# Patient Record
Sex: FEMALE | Race: WHITE | Employment: OTHER | URBAN - METROPOLITAN AREA
[De-identification: names, ages, dates, MRNs, and addresses within clinical notes are randomized per-mention and may not be internally consistent; named-entity substitution may affect disease eponyms.]

---

## 2020-11-01 ENCOUNTER — APPOINTMENT (OUTPATIENT)
Dept: GENERAL RADIOLOGY | Age: 85
DRG: 177 | End: 2020-11-01
Payer: COMMERCIAL

## 2020-11-01 ENCOUNTER — HOSPITAL ENCOUNTER (INPATIENT)
Age: 85
LOS: 12 days | Discharge: NURSING, REHAB OR LONG-TERM CARE | DRG: 177 | End: 2020-11-13
Attending: EMERGENCY MEDICINE | Admitting: INTERNAL MEDICINE
Payer: COMMERCIAL

## 2020-11-01 PROBLEM — U07.1 COVID-19: Status: ACTIVE | Noted: 2020-11-01

## 2020-11-01 LAB
ALBUMIN SERPL-MCNC: 3.1 G/DL (ref 3.5–5.2)
ALP BLD-CCNC: 63 U/L (ref 35–104)
ALT SERPL-CCNC: 8 U/L (ref 0–32)
ANION GAP SERPL CALCULATED.3IONS-SCNC: 14 MMOL/L (ref 7–16)
ANION GAP SERPL CALCULATED.3IONS-SCNC: 18 MMOL/L (ref 7–16)
AST SERPL-CCNC: 30 U/L (ref 0–31)
BASOPHILS ABSOLUTE: 0.05 E9/L (ref 0–0.2)
BASOPHILS RELATIVE PERCENT: 0.4 % (ref 0–2)
BILIRUB SERPL-MCNC: 0.4 MG/DL (ref 0–1.2)
BUN BLDV-MCNC: 109 MG/DL (ref 8–23)
BUN BLDV-MCNC: 89 MG/DL (ref 8–23)
C-REACTIVE PROTEIN: 29.2 MG/DL (ref 0–0.4)
CALCIUM SERPL-MCNC: 8.4 MG/DL (ref 8.6–10.2)
CALCIUM SERPL-MCNC: 9.2 MG/DL (ref 8.6–10.2)
CHLORIDE BLD-SCNC: 111 MMOL/L (ref 98–107)
CHLORIDE BLD-SCNC: 115 MMOL/L (ref 98–107)
CO2: 22 MMOL/L (ref 22–29)
CO2: 23 MMOL/L (ref 22–29)
CREAT SERPL-MCNC: 1.6 MG/DL (ref 0.5–1)
CREAT SERPL-MCNC: 3 MG/DL (ref 0.5–1)
CREATININE URINE: 88 MG/DL (ref 29–226)
D DIMER: 907 NG/ML DDU
EKG ATRIAL RATE: 96 BPM
EKG P AXIS: 48 DEGREES
EKG P-R INTERVAL: 132 MS
EKG Q-T INTERVAL: 356 MS
EKG QRS DURATION: 72 MS
EKG QTC CALCULATION (BAZETT): 449 MS
EKG R AXIS: 9 DEGREES
EKG T AXIS: 71 DEGREES
EKG VENTRICULAR RATE: 96 BPM
EOSINOPHILS ABSOLUTE: 0.02 E9/L (ref 0.05–0.5)
EOSINOPHILS RELATIVE PERCENT: 0.2 % (ref 0–6)
FERRITIN: 999 NG/ML
GFR AFRICAN AMERICAN: 18
GFR AFRICAN AMERICAN: 36
GFR NON-AFRICAN AMERICAN: 15 ML/MIN/1.73
GFR NON-AFRICAN AMERICAN: 30 ML/MIN/1.73
GLUCOSE BLD-MCNC: 106 MG/DL (ref 74–99)
GLUCOSE BLD-MCNC: 196 MG/DL (ref 74–99)
HCT VFR BLD CALC: 46.8 % (ref 34–48)
HEMOGLOBIN: 14.7 G/DL (ref 11.5–15.5)
IMMATURE GRANULOCYTES #: 0.22 E9/L
IMMATURE GRANULOCYTES %: 1.7 % (ref 0–5)
LACTIC ACID: 2.1 MMOL/L (ref 0.5–2.2)
LYMPHOCYTES ABSOLUTE: 2.58 E9/L (ref 1.5–4)
LYMPHOCYTES RELATIVE PERCENT: 20 % (ref 20–42)
MCH RBC QN AUTO: 26.7 PG (ref 26–35)
MCHC RBC AUTO-ENTMCNC: 31.4 % (ref 32–34.5)
MCV RBC AUTO: 84.9 FL (ref 80–99.9)
MONOCYTES ABSOLUTE: 0.68 E9/L (ref 0.1–0.95)
MONOCYTES RELATIVE PERCENT: 5.3 % (ref 2–12)
NEUTROPHILS ABSOLUTE: 9.36 E9/L (ref 1.8–7.3)
NEUTROPHILS RELATIVE PERCENT: 72.4 % (ref 43–80)
OSMOLALITY URINE: 594 MOSM/KG (ref 300–900)
OSMOLALITY: 349 MOSM/KG (ref 285–310)
PDW BLD-RTO: 15.4 FL (ref 11.5–15)
PLATELET # BLD: 299 E9/L (ref 130–450)
PMV BLD AUTO: 10.9 FL (ref 7–12)
POTASSIUM SERPL-SCNC: 3.9 MMOL/L (ref 3.5–5)
POTASSIUM SERPL-SCNC: 4.4 MMOL/L (ref 3.5–5)
PROCALCITONIN: 1.11 NG/ML (ref 0–0.08)
RBC # BLD: 5.51 E12/L (ref 3.5–5.5)
REASON FOR REJECTION: NORMAL
REASON FOR REJECTION: NORMAL
REJECTED TEST: NORMAL
REJECTED TEST: NORMAL
SEDIMENTATION RATE, ERYTHROCYTE: 101 MM/HR (ref 0–20)
SODIUM BLD-SCNC: 151 MMOL/L (ref 132–146)
SODIUM BLD-SCNC: 152 MMOL/L (ref 132–146)
SODIUM URINE: <20 MMOL/L
TOTAL PROTEIN: 7.8 G/DL (ref 6.4–8.3)
TROPONIN: 0.09 NG/ML (ref 0–0.03)
TROPONIN: <0.01 NG/ML (ref 0–0.03)
WBC # BLD: 12.9 E9/L (ref 4.5–11.5)

## 2020-11-01 PROCEDURE — 84300 ASSAY OF URINE SODIUM: CPT

## 2020-11-01 PROCEDURE — 93005 ELECTROCARDIOGRAM TRACING: CPT | Performed by: EMERGENCY MEDICINE

## 2020-11-01 PROCEDURE — 2580000003 HC RX 258: Performed by: INTERNAL MEDICINE

## 2020-11-01 PROCEDURE — 2580000003 HC RX 258: Performed by: EMERGENCY MEDICINE

## 2020-11-01 PROCEDURE — 96365 THER/PROPH/DIAG IV INF INIT: CPT

## 2020-11-01 PROCEDURE — 82728 ASSAY OF FERRITIN: CPT

## 2020-11-01 PROCEDURE — 85651 RBC SED RATE NONAUTOMATED: CPT

## 2020-11-01 PROCEDURE — 96367 TX/PROPH/DG ADDL SEQ IV INF: CPT

## 2020-11-01 PROCEDURE — 2700000000 HC OXYGEN THERAPY PER DAY

## 2020-11-01 PROCEDURE — 2500000003 HC RX 250 WO HCPCS: Performed by: INTERNAL MEDICINE

## 2020-11-01 PROCEDURE — 86140 C-REACTIVE PROTEIN: CPT

## 2020-11-01 PROCEDURE — 84145 PROCALCITONIN (PCT): CPT

## 2020-11-01 PROCEDURE — 80053 COMPREHEN METABOLIC PANEL: CPT

## 2020-11-01 PROCEDURE — 85025 COMPLETE CBC W/AUTO DIFF WBC: CPT

## 2020-11-01 PROCEDURE — 6360000002 HC RX W HCPCS: Performed by: INTERNAL MEDICINE

## 2020-11-01 PROCEDURE — 71045 X-RAY EXAM CHEST 1 VIEW: CPT

## 2020-11-01 PROCEDURE — 85378 FIBRIN DEGRADE SEMIQUANT: CPT

## 2020-11-01 PROCEDURE — 84484 ASSAY OF TROPONIN QUANT: CPT

## 2020-11-01 PROCEDURE — XW033E5 INTRODUCTION OF REMDESIVIR ANTI-INFECTIVE INTO PERIPHERAL VEIN, PERCUTANEOUS APPROACH, NEW TECHNOLOGY GROUP 5: ICD-10-PCS | Performed by: INTERNAL MEDICINE

## 2020-11-01 PROCEDURE — 2500000003 HC RX 250 WO HCPCS: Performed by: EMERGENCY MEDICINE

## 2020-11-01 PROCEDURE — 36415 COLL VENOUS BLD VENIPUNCTURE: CPT

## 2020-11-01 PROCEDURE — 82570 ASSAY OF URINE CREATININE: CPT

## 2020-11-01 PROCEDURE — 87040 BLOOD CULTURE FOR BACTERIA: CPT

## 2020-11-01 PROCEDURE — 83930 ASSAY OF BLOOD OSMOLALITY: CPT

## 2020-11-01 PROCEDURE — 83605 ASSAY OF LACTIC ACID: CPT

## 2020-11-01 PROCEDURE — 83615 LACTATE (LD) (LDH) ENZYME: CPT

## 2020-11-01 PROCEDURE — 6370000000 HC RX 637 (ALT 250 FOR IP): Performed by: INTERNAL MEDICINE

## 2020-11-01 PROCEDURE — 6360000002 HC RX W HCPCS: Performed by: EMERGENCY MEDICINE

## 2020-11-01 PROCEDURE — 99283 EMERGENCY DEPT VISIT LOW MDM: CPT

## 2020-11-01 PROCEDURE — 80048 BASIC METABOLIC PNL TOTAL CA: CPT

## 2020-11-01 PROCEDURE — 83935 ASSAY OF URINE OSMOLALITY: CPT

## 2020-11-01 PROCEDURE — 96361 HYDRATE IV INFUSION ADD-ON: CPT

## 2020-11-01 PROCEDURE — 93010 ELECTROCARDIOGRAM REPORT: CPT | Performed by: INTERNAL MEDICINE

## 2020-11-01 PROCEDURE — 2140000000 HC CCU INTERMEDIATE R&B

## 2020-11-01 RX ORDER — HEPARIN SODIUM 10000 [USP'U]/ML
5000 INJECTION, SOLUTION INTRAVENOUS; SUBCUTANEOUS EVERY 8 HOURS SCHEDULED
Status: DISCONTINUED | OUTPATIENT
Start: 2020-11-01 | End: 2020-11-02

## 2020-11-01 RX ORDER — 0.9 % SODIUM CHLORIDE 0.9 %
30 INTRAVENOUS SOLUTION INTRAVENOUS PRN
Status: DISCONTINUED | OUTPATIENT
Start: 2020-11-01 | End: 2020-11-14 | Stop reason: HOSPADM

## 2020-11-01 RX ORDER — 0.9 % SODIUM CHLORIDE 0.9 %
1000 INTRAVENOUS SOLUTION INTRAVENOUS ONCE
Status: COMPLETED | OUTPATIENT
Start: 2020-11-01 | End: 2020-11-01

## 2020-11-01 RX ORDER — DEXAMETHASONE SODIUM PHOSPHATE 10 MG/ML
8 INJECTION, SOLUTION INTRAMUSCULAR; INTRAVENOUS ONCE
Status: COMPLETED | OUTPATIENT
Start: 2020-11-01 | End: 2020-11-01

## 2020-11-01 RX ORDER — ACETAMINOPHEN 650 MG/1
650 SUPPOSITORY RECTAL EVERY 6 HOURS PRN
Status: DISCONTINUED | OUTPATIENT
Start: 2020-11-01 | End: 2020-11-14 | Stop reason: HOSPADM

## 2020-11-01 RX ORDER — ACETAMINOPHEN 325 MG/1
650 TABLET ORAL EVERY 6 HOURS PRN
Status: DISCONTINUED | OUTPATIENT
Start: 2020-11-01 | End: 2020-11-14 | Stop reason: HOSPADM

## 2020-11-01 RX ORDER — BISACODYL 10 MG
10 SUPPOSITORY, RECTAL RECTAL PRN
Status: DISCONTINUED | OUTPATIENT
Start: 2020-11-01 | End: 2020-11-14 | Stop reason: HOSPADM

## 2020-11-01 RX ORDER — LATANOPROST 50 UG/ML
1 SOLUTION/ DROPS OPHTHALMIC NIGHTLY
Status: DISCONTINUED | OUTPATIENT
Start: 2020-11-01 | End: 2020-11-14 | Stop reason: HOSPADM

## 2020-11-01 RX ORDER — GUAIFENESIN/DEXTROMETHORPHAN 100-10MG/5
5 SYRUP ORAL EVERY 4 HOURS PRN
Status: DISCONTINUED | OUTPATIENT
Start: 2020-11-01 | End: 2020-11-14 | Stop reason: HOSPADM

## 2020-11-01 RX ORDER — 0.9 % SODIUM CHLORIDE 0.9 %
500 INTRAVENOUS SOLUTION INTRAVENOUS ONCE
Status: COMPLETED | OUTPATIENT
Start: 2020-11-01 | End: 2020-11-01

## 2020-11-01 RX ORDER — DEXAMETHASONE SODIUM PHOSPHATE 4 MG/ML
6 INJECTION, SOLUTION INTRA-ARTICULAR; INTRALESIONAL; INTRAMUSCULAR; INTRAVENOUS; SOFT TISSUE DAILY
Status: DISCONTINUED | OUTPATIENT
Start: 2020-11-02 | End: 2020-11-05

## 2020-11-01 RX ORDER — DEXTROSE MONOHYDRATE 50 MG/ML
INJECTION, SOLUTION INTRAVENOUS CONTINUOUS
Status: DISCONTINUED | OUTPATIENT
Start: 2020-11-01 | End: 2020-11-02

## 2020-11-01 RX ADMIN — DEXAMETHASONE SODIUM PHOSPHATE 8 MG: 10 INJECTION, SOLUTION INTRAMUSCULAR; INTRAVENOUS at 07:31

## 2020-11-01 RX ADMIN — IPRATROPIUM BROMIDE AND ALBUTEROL 1 PUFF: 20; 100 SPRAY, METERED RESPIRATORY (INHALATION) at 20:22

## 2020-11-01 RX ADMIN — IPRATROPIUM BROMIDE AND ALBUTEROL 1 PUFF: 20; 100 SPRAY, METERED RESPIRATORY (INHALATION) at 17:19

## 2020-11-01 RX ADMIN — HEPARIN SODIUM 5000 UNITS: 10000 INJECTION INTRAVENOUS; SUBCUTANEOUS at 21:33

## 2020-11-01 RX ADMIN — REMDESIVIR 200 MG: 100 INJECTION, POWDER, LYOPHILIZED, FOR SOLUTION INTRAVENOUS at 18:27

## 2020-11-01 RX ADMIN — HEPARIN SODIUM 5000 UNITS: 10000 INJECTION INTRAVENOUS; SUBCUTANEOUS at 18:27

## 2020-11-01 RX ADMIN — LATANOPROST 1 DROP: 50 SOLUTION OPHTHALMIC at 21:33

## 2020-11-01 RX ADMIN — DEXTROSE MONOHYDRATE: 50 INJECTION, SOLUTION INTRAVENOUS at 23:55

## 2020-11-01 RX ADMIN — DOXYCYCLINE 100 MG: 100 INJECTION, POWDER, LYOPHILIZED, FOR SOLUTION INTRAVENOUS at 07:08

## 2020-11-01 RX ADMIN — CEFEPIME 2 G: 2 INJECTION, POWDER, FOR SOLUTION INTRAVENOUS at 06:44

## 2020-11-01 RX ADMIN — SODIUM CHLORIDE 500 ML: 9 INJECTION, SOLUTION INTRAVENOUS at 06:09

## 2020-11-01 RX ADMIN — SODIUM CHLORIDE 1000 ML: 9 INJECTION, SOLUTION INTRAVENOUS at 07:31

## 2020-11-01 RX ADMIN — DEXTROSE MONOHYDRATE: 50 INJECTION, SOLUTION INTRAVENOUS at 13:00

## 2020-11-01 ASSESSMENT — PAIN SCALES - PAIN ASSESSMENT IN ADVANCED DEMENTIA (PAINAD)
FACIALEXPRESSION: 0
BODYLANGUAGE: 0
FACIALEXPRESSION: 0
BREATHING: 0
BODYLANGUAGE: 1
NEGVOCALIZATION: 1
BREATHING: 0
BODYLANGUAGE: 1
TOTALSCORE: 1
BREATHING: 0
CONSOLABILITY: 0
FACIALEXPRESSION: 0
CONSOLABILITY: 1
TOTALSCORE: 3
TOTALSCORE: 2
CONSOLABILITY: 0

## 2020-11-01 NOTE — H&P
Inpatient H&P      PCP:  Luan Ham MD  Admitting Physician:  Jony Michael DO  Consultants: Infectious disease  Chief Complaint:    Chief Complaint   Patient presents with    Shortness of Breath     low pulse ox       History of Present Illness  Teresa Parker is a 80 y.o. female who presents to [address] [place] [address] ER with shortness of breath. Adan Dinh has a past medical history that includes dementia, rheumatoid arthritis, spinal stenosis. History is obtained from EMR R and ER physician due to patient currently nonverbal.  Kurt Baumgarten presented to the ER from [facility] for evaluation of hypoxia. She was found to be positive for coronavirus at their facility. Upon ER arrival she was found to be 86%. In the ER, she was found to be in renal failure and hyponatremic. Chest x-ray showing bilateral infiltrates. I spoke with her daughter, Bertram Razo, who is the POA and discussed CODE STATUS. Bertram Razo wants her to be a limited code with no intubation, no CPR, no emergency resuscitative meds, no defibrillation. She wishes for her to still get fluids for her renal failure and treatment for coronavirus. ER Course  Upon presentation to the ER, routine labwork was performed which revealed sodium of 152, BUN of 109, creatinine 3.0, troponin 0 0.09, WBC 12.9.. Imaging results are as outlined below in the Imaging section of this note. EKG revealed NSR with possible left atrial enlargement and nonspecific ST abnormality. Upon arrival to the ER, patient was 99/61.   The patient received Decadron in the emergency room and was admitted under the care of [address] Admission - 10/25/15     DEMENTIA     PSYCHOSIS     Normocytic anemia     Rheumatoid arthritis ([facility] [address]     Cervical spondylosis     Lumbar spondylosis     Shoulder joint dysfunction     DDD (degenerative disc disease)     Spinal stenosis     Vertebral compression fracture      Bulging lumbar disc     Cervical spinal canal tumor Dementia associated with other underlying disease with behavioral disturbance    Last Echocardiogram - 5/2/13    Mild mitral regurgitation is present. No mitral valve stenosis present. 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 The aortic valve area is 1.2 cm2 with a maximum gradient of 22.60 mmHg and    a mean gradient of 13.70 mmHg. Mild aortic stenosis is present. The aortic valve is trileaflet. The aortic valve appears moderately sclerotic. No evidence of aortic valve regurgitation. Regular rhythm. ED TRIAGE VITALS  BP: (!) 107/56, Temp: 97.4 °F (36.3 °C), Pulse: 88, Resp: 24, SpO2: 96 %    Vitals:    11/01/20 0421 11/01/20 0635   BP: 99/61 (!) 107/56   Pulse: 96 88   Resp: 28 24   Temp:  97.4 °F (36.3 °C)   TempSrc:  Axillary   SpO2: 99% 96%   Weight: 115 lb (52.2 kg)    Height: 5' 4\" (1.626 m)          Histories  Past Medical History:   Diagnosis Date    Anemia     Cellulitis     GERD (gastroesophageal reflux disease)     Glaucoma     Hypertension     Hypokalemia     Insomnia     Osteoarthritis     Rheumatoid arthritis(714.0)      Past Surgical History:   Procedure Laterality Date    EYE SURGERY      cataracts    JOINT REPLACEMENT Left     OTHER SURGICAL HISTORY Right 8/7/2015    IM nailing right femor     Family History   Problem Relation Age of Onset    Heart Failure Mother        Home Medications  Prior to Admission medications    Medication Sig Start Date End Date Taking?  Authorizing Provider   haloperidol lactate (HALDOL) 5 MG/ML injection Inject 0.1 mLs into the muscle every 6 hours as needed for Agitation 10/26/15   Tasneem Zavala MD   haloperidol lactate (HALDOL) 5 MG/ML injection Inject 0.2 mLs into the muscle every 6 hours as needed for Agitation 10/26/15   Tasneem Zavala MD   oxyCODONE HCl ER 10 MG CR tablet Take 10 mg by mouth every 12 hours    Historical Provider, MD   Omega 3 1000 MG CAPS Take 1,000 mg by mouth    Historical Provider, MD   QUEtiapine (SEROQUEL) 25 MG tablet Take 0.5 tablets by mouth nightly 10/6/15   Issac Abdullahi MD   QUEtiapine (SEROQUEL) 25 MG tablet Take 0.5 tablets by mouth 2 times daily as needed (for Anxiety/Agitation) 10/6/15   Issac Abdullahi MD   rivaroxaban (XARELTO) 15 MG TABS tablet Take 1 tablet by mouth daily (with breakfast) 10/6/15   Issac Abdullahi MD   Ascorbic Acid (VITAMIN C) 250 MG tablet Take 500 mg by mouth 4 times daily     Historical Provider, MD   calcium-vitamin D (OSCAL-500) 500-200 MG-UNIT per tablet Take 1 tablet by mouth 2 times daily    Historical Provider, MD   metoprolol (TOPROL-XL) 50 MG XL tablet Take 50 mg by mouth 2 times daily    Historical Provider, MD   amLODIPine (NORVASC) 2.5 MG tablet Take 2.5 mg by mouth daily    Historical Provider, MD   pantoprazole sodium (PROTONIX) 40 MG PACK packet Take 40 mg by mouth every morning (before breakfast)    Historical Provider, MD   ferrous sulfate 325 (65 FE) MG tablet Take 325 mg by mouth daily (with breakfast)    Historical Provider, MD   sucralfate (CARAFATE) 1 GM tablet Take 1 g by mouth 2 times daily    Historical Provider, MD   aspirin 81 MG tablet Take 81 mg by mouth daily    Historical Provider, MD   Potassium Chloride (KLOR-CON PO) Take 10 mEq by mouth daily    Historical Provider, MD   bisacodyl (DULCOLAX) 10 MG suppository Place 10 mg rectally as needed for Constipation    Historical Provider, MD   traMADol (ULTRAM) 50 MG tablet Take 50 mg by mouth every 6 hours as needed for Pain    Historical Provider, MD   zolpidem (AMBIEN) 5 MG tablet Take 5 mg by mouth nightly as needed for Sleep    Historical Provider, MD   lactase (LACTAID) 3000 UNITS tablet Take 1 tablet by mouth 3 times daily as needed    Historical Provider, MD   acetaminophen (TYLENOL) 500 MG tablet Take 1 tablet by mouth every 4 hours as needed 8/10/15   Malia Mclean DO   celecoxib (CELEBREX) 200 MG capsule Take 1 capsule by mouth daily 8/10/15   Malia Mclean DO   docusate sodium (COLACE, DULCOLAX) 100 MG CAPS Take 100 mg by mouth 2 times daily 8/10/15   Terell Washington MD   magnesium hydroxide (MILK OF MAGNESIA) 400 MG/5ML suspension Take 30 mLs by mouth daily as needed for Constipation 8/10/15   Terell Washington MD   Multiple Vitamins-Minerals (THERAPEUTIC MULTIVITAMIN-MINERALS) tablet Take 1 tablet by mouth daily 8/10/15   Terell Washington MD   furosemide (LASIX) 20 MG tablet Take 20 mg by mouth daily. Historical Provider, MD   Probiotic Product (PROBIOTIC + OMEGA-3) CAPS Take 3 capsules by mouth daily. Historical Provider, MD   therapeutic multivitamin-minerals (THERAGRAN-M) tablet Take 1 tablet by mouth daily. Historical Provider, MD   Bimatoprost (LUMIGAN OP) Apply 1 drop to eye daily. Each eye    Historical Provider, MD       Allergies  Food    Social Hx  Social History     Socioeconomic History    Marital status:       Spouse name: Not on file    Number of children: Not on file    Years of education: Not on file    Highest education level: Not on file   Occupational History    Not on file   Social Needs    Financial resource strain: Not on file    Food insecurity     Worry: Not on file     Inability: Not on file    Transportation needs     Medical: Not on file     Non-medical: Not on file   Tobacco Use    Smoking status: Former Smoker    Smokeless tobacco: Never Used   Substance and Sexual Activity    Alcohol use: No    Drug use: No    Sexual activity: Not on file   Lifestyle    Physical activity     Days per week: Not on file     Minutes per session: Not on file    Stress: Not on file   Relationships    Social connections     Talks on phone: Not on file     Gets together: Not on file     Attends Gnosticism service: Not on file     Active member of club or organization: Not on file     Attends meetings of clubs or organizations: Not on file     Relationship status: Not on file    Intimate partner violence     Fear of current or ex partner: Not on file     Emotionally abused: Not on file     Physically abused: Not on file     Forced sexual activity: Not on file   Other Topics Concern    Not on file   Social History Narrative    Not on file       Review of Systems  Unable to be obtained, patient is nonverbal    Physical Examination  Vitals:  BP (!) 107/56   Pulse 88   Temp 97.4 °F (36.3 °C) (Axillary)   Resp 24   Ht 5' 4\" (1.626 m)   Wt 115 lb (52.2 kg)   SpO2 96%   BMI 19.74 kg/m²   General Appearance:  awake, nonverbal, not following commands. 6L NC  HEENT:  NCAT; PERRL; conjunctiva pink, sclera clear dry mucous membranes  Neck:  no adenopathy, bruit, JVD, tenderness, masses, or nodules; supple, symmetrical, trachea midline, thyroid not enlarged  Lung:  clear to auscultation bilaterally; no use of accessory muscles; no rhonchi, rales, or crackles  Heart:  regular rate and regular rhythm without murmur, rub, or gallop  Abdomen:  soft, nontender, nondistended; normoactive bowel sounds; no organomegaly  Extremities:  extremities normal, atraumatic, no cyanosis or edema  Musculokeletal:  no joint swelling, no muscle tenderness. ROM normal in all joints of extremities.    Neurologic:  mental status A&Ox3, thought content appropriate; CN II-XII grossly intact; sensation intact, motor strength 5/5 globally; no slurred speech      Laboratory Data  Recent Results (from the past 24 hour(s))   CBC Auto Differential    Collection Time: 11/01/20  4:40 AM   Result Value Ref Range    WBC 12.9 (H) 4.5 - 11.5 E9/L    RBC 5.51 (H) 3.50 - 5.50 E12/L    Hemoglobin 14.7 11.5 - 15.5 g/dL    Hematocrit 46.8 34.0 - 48.0 %    MCV 84.9 80.0 - 99.9 fL    MCH 26.7 26.0 - 35.0 pg    MCHC 31.4 (L) 32.0 - 34.5 %    RDW 15.4 (H) 11.5 - 15.0 fL    Platelets 014 949 - 359 E9/L    MPV 10.9 7.0 - 12.0 fL    Neutrophils % 72.4 43.0 - 80.0 %    Immature Granulocytes % 1.7 0.0 - 5.0 %    Lymphocytes % 20.0 20.0 - 42.0 %    Monocytes % 5.3 2.0 - 12.0 %    Eosinophils % 0.2 0.0 - 6.0 % Basophils % 0.4 0.0 - 2.0 %    Neutrophils Absolute 9.36 (H) 1.80 - 7.30 E9/L    Immature Granulocytes # 0.22 E9/L    Lymphocytes Absolute 2.58 1.50 - 4.00 E9/L    Monocytes Absolute 0.68 0.10 - 0.95 E9/L    Eosinophils Absolute 0.02 (L) 0.05 - 0.50 E9/L    Basophils Absolute 0.05 0.00 - 0.20 E9/L   Lactic Acid, Plasma    Collection Time: 11/01/20  4:40 AM   Result Value Ref Range    Lactic Acid 2.1 0.5 - 2.2 mmol/L   SPECIMEN REJECTION    Collection Time: 11/01/20  5:21 AM   Result Value Ref Range    Rejected Test CMP TROP     Reason for Rejection see below    Comprehensive metabolic panel    Collection Time: 11/01/20  6:01 AM   Result Value Ref Range    Sodium 152 (H) 132 - 146 mmol/L    Potassium 4.4 3.5 - 5.0 mmol/L    Chloride 111 (H) 98 - 107 mmol/L    CO2 23 22 - 29 mmol/L    Anion Gap 18 (H) 7 - 16 mmol/L    Glucose 106 (H) 74 - 99 mg/dL     (H) 8 - 23 mg/dL    CREATININE 3.0 (H) 0.5 - 1.0 mg/dL    GFR Non-African American 15 >=60 mL/min/1.73    GFR African American 18     Calcium 9.2 8.6 - 10.2 mg/dL    Total Protein 7.8 6.4 - 8.3 g/dL    Alb 3.1 (L) 3.5 - 5.2 g/dL    Total Bilirubin 0.4 0.0 - 1.2 mg/dL    Alkaline Phosphatase 63 35 - 104 U/L    ALT 8 0 - 32 U/L    AST 30 0 - 31 U/L   Troponin    Collection Time: 11/01/20  6:01 AM   Result Value Ref Range    Troponin 0.09 (H) 0.00 - 0.03 ng/mL       Imaging  Xr Chest Portable    Result Date: 11/1/2020  EXAMINATION: ONE XRAY VIEW OF THE CHEST 11/1/2020 5:07 am COMPARISON: 10/21/2015 HISTORY: ORDERING SYSTEM PROVIDED HISTORY: chest pain TECHNOLOGIST PROVIDED HISTORY: Reason for exam:->chest pain What reading provider will be dictating this exam?->CRC FINDINGS: There are bilateral infiltrates with predominantly peripheral distribution. There is no pleural effusion or pneumothorax seen. There is no cardiomegaly or pulmonary vascular congestion. There are old right-sided rib fractures.  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 Bilateral infiltrates. Assessment and Plan  Patient is a 80 y.o. female who presented with shortness of breath. The active problem list is as follows:    · Acute hypoxic respiratory failure secondary to COVID-19 viral pneumonia-infectious diseases been consulted. Decadron daily. Covid labs. Remdesivir. · Acute kidney injury- likely secondary to dehydration. Continue IV fluids  · Hypernatremia-hypotonic fluids. Will recheck BMP and adjust fluids based on results. I's and O's. · Elevated troponin in the setting of acute kidney injury    · Routine labs in the morning. · DVT prophylaxis. · Please see orders for further management and care.         Jesse Mar  DO  7:39 AM  11/1/2020

## 2020-11-01 NOTE — ACP (ADVANCE CARE PLANNING)
ADVANCED CARE PLANNING    Patient Name: Kayla Philip       YOB: 1925              MRN:    [id number]  Admission Date:  11/1/2020  4:19 AM      Active Diagnoses: Active Problems:    COVID-19  Resolved Problems:    * No resolved hospital problems. *      These active diagnoses are of sufficient risk that focused discussion on advanced care planning is indicated in order to allow the patient to thoughtfully consider personal goals of care; and, if situations arise that prevent the patient to personally give input, to ensure appropriate representation of their personal desire for different levels and levels of care. Person(s) present in discussion:  Jackson Valdes DO, Family members:Daughter Alexia Bridges, [place]. Discussion:  Patient is nonverbal currently and not able to participate in care plan. I called daughter Alexia Bridges who is POA ([phone]) and spoke with her about code status and care plan. She states that Ms. Antonio Dailey would not want to be intubated, have CPR, emergency resuscitative medications, or defibrillation. Alexia Bridges still wants Ms. Diaz to receive fluids and treatment for COVID-19 for now. Will re-evaluate daily. PLAN: NO intubation, NO defibrillation, NO CPR, NO emergency resuscitative medications. IV fluids and COVID-19 treatment fo rnow. Code Status:  At this time patient wishes to be Limited      Time Spent on Advanced Planning Documents: 17 minutes    [address]

## 2020-11-01 NOTE — ED PROVIDER NOTES
Department of Emergency Medicine   ED  Provider Note  Admit Date/RoomTime: 11/1/2020  4:19 AM  ED Room: 16/16  Chief Complaint      Shortness of Breath (low pulse ox)    History of Present Illness   Source of history provided by:  EMS personnel and nursing home. History/Exam Limitations: due to condition. Tariq Woodruff is a 80 y.o. old female who has a past medical history of:   Past Medical History:   Diagnosis Date    Anemia     Cellulitis     GERD (gastroesophageal reflux disease)     Glaucoma     Hypertension     Hypokalemia     Insomnia     Osteoarthritis     Rheumatoid arthritis(714.0)      Patient with history of dementia presents from [address] [place] [address] for evaluation of hypoxia. She was found to be Covid positive at their facility. On arrival here, she is 86% on room air and tachypneic. She is awake and alert but not answering questions or following commands. No further history can be obtained from the patient. .  ROS   Pertinent positives and negatives are stated within HPI, all other systems reviewed and are negative. Past Surgical History:   Procedure Laterality Date    EYE SURGERY      cataracts    JOINT REPLACEMENT Left     OTHER SURGICAL HISTORY Right 8/7/2015    IM nailing right femor   Social History:  reports that she has quit smoking. She has never used smokeless tobacco. She reports that she does not drink alcohol or use drugs. Family History: family history includes Heart Failure in her mother. Allergies: Food    Physical Exam           ED Triage Vitals [11/01/20 0421]   BP Temp Temp src Pulse Resp SpO2 Height Weight   99/61 -- -- 96 28 99 % 5' 4\" (1.626 m) 115 lb (52.2 kg)      Oxygen Saturation Interpretation: Abnormal and Improved after treatment. · General Appearance/Constitutional:  Alert  · HEENT:  NC/NT. PERRLA. Airway patent. · Neck:  Normal ROM. Supple. · Respiratoty:  Breath sounds: reduced bilaterally.         Lung sounds: rhonchi- throughout. · CV:  Regular rate and rhythm, normal heart sounds, without pathological murmurs, ectopy, gallops, or rubs. .  · GI:  Soft, nontender, good bowel sounds. No firm or pulsatile mass. · Integument:  Normal turgor. Warm, dry, without visible rash. · Extremities/Lymphatics:  Edema:  none Bilateral lower extremity(s).  No evidence of DVT seen on physical exam..  · Neurological:    Glascow Coma Scale:  Best Eye Response 4 - Opens eyes on own   Best Verbal Response 2 - Moans, makes unintelligible sounds   Best Motor Response 5 - Pushes away noxious stimulus   Total Score 11         Lab / Imaging Results   (All laboratory and radiology results have been personally reviewed by myself)  Labs:  Results for orders placed or performed during the hospital encounter of 11/01/20   CBC Auto Differential   Result Value Ref Range    WBC 12.9 (H) 4.5 - 11.5 E9/L    RBC 5.51 (H) 3.50 - 5.50 E12/L    Hemoglobin 14.7 11.5 - 15.5 g/dL    Hematocrit 46.8 34.0 - 48.0 %    MCV 84.9 80.0 - 99.9 fL    MCH 26.7 26.0 - 35.0 pg    MCHC 31.4 (L) 32.0 - 34.5 %    RDW 15.4 (H) 11.5 - 15.0 fL    Platelets 142 042 - 595 E9/L    MPV 10.9 7.0 - 12.0 fL    Neutrophils % 72.4 43.0 - 80.0 %    Immature Granulocytes % 1.7 0.0 - 5.0 %    Lymphocytes % 20.0 20.0 - 42.0 %    Monocytes % 5.3 2.0 - 12.0 %    Eosinophils % 0.2 0.0 - 6.0 %    Basophils % 0.4 0.0 - 2.0 %    Neutrophils Absolute 9.36 (H) 1.80 - 7.30 E9/L    Immature Granulocytes # 0.22 E9/L    Lymphocytes Absolute 2.58 1.50 - 4.00 E9/L    Monocytes Absolute 0.68 0.10 - 0.95 E9/L    Eosinophils Absolute 0.02 (L) 0.05 - 0.50 E9/L    Basophils Absolute 0.05 0.00 - 0.20 E9/L   Lactic Acid, Plasma   Result Value Ref Range    Lactic Acid 2.1 0.5 - 2.2 mmol/L   Comprehensive metabolic panel   Result Value Ref Range    Sodium 152 (H) 132 - 146 mmol/L    Potassium 4.4 3.5 - 5.0 mmol/L    Chloride 111 (H) 98 - 107 mmol/L    CO2 23 22 - 29 mmol/L    Anion Gap 18 (H) 7 - 16 mmol/L    Glucose 106 (H) 74 - 99 mg/dL     (H) 8 - 23 mg/dL    CREATININE 3.0 (H) 0.5 - 1.0 mg/dL    GFR Non-African American 15 >=60 mL/min/1.73    GFR African American 18     Calcium 9.2 8.6 - 10.2 mg/dL    Total Protein 7.8 6.4 - 8.3 g/dL    Alb 3.1 (L) 3.5 - 5.2 g/dL    Total Bilirubin 0.4 0.0 - 1.2 mg/dL    Alkaline Phosphatase 63 35 - 104 U/L    ALT 8 0 - 32 U/L    AST 30 0 - 31 U/L   Troponin   Result Value Ref Range    Troponin 0.09 (H) 0.00 - 0.03 ng/mL   SPECIMEN REJECTION   Result Value Ref Range    Rejected Test CMP TROP     Reason for Rejection see below      Imaging: All Radiology results interpreted by Radiologist unless otherwise noted. XR CHEST PORTABLE   Final Result   Bilateral infiltrates. EKG #1:  Interpreted by emergency department physician unless otherwise noted. Time:  4:32    Rate: 96  Rhythm: Sinus. Interpretation: sinus tachycardia. ED Course / Medical Decision Making     Medications   doxycycline (VIBRAMYCIN) 100 mg in dextrose 5 % 100 mL IVPB (100 mg Intravenous New Bag 11/1/20 0708)   0.9 % sodium chloride bolus (1,000 mLs Intravenous New Bag 11/1/20 0731)   0.9 % sodium chloride bolus (0 mLs Intravenous Stopped 11/1/20 0639)   cefepime (MAXIPIME) 2 g IVPB extended (mini-bag) (0 g Intravenous Stopped 11/1/20 0707)   dexamethasone (PF) (DECADRON) injection 8 mg (8 mg Intravenous Given 11/1/20 0731)     Re-Evaluations:  11/1/20      Patients symptoms show no change. Consultations:             IP CONSULT TO INTERNAL MEDICINE    Procedures:   none    MDM: Patient presents to the ED for evaluation from the nursing home for hypoxia. She was recently diagnosed with COVID-19 infection. She was placed on nasal cannula oxygen with improvement of symptoms. Chest x-ray shows bilateral infiltrate. She also had acute kidney injury. Blood cultures were obtained and patient was started on IV fluids, antibiotics and steroids.   She was admitted to [facility] physicians for further management. Counseling:   I have spoken with the patient and discussed todays results, in addition to providing specific details for the plan of care and counseling regarding the diagnosis and prognosis and are agreeable with the plan. Assessment      1. COVID-19 virus infection    2. Hypoxemia    3. Dementia without behavioral disturbance, unspecified dementia type ([facility] [address]    4. Pneumonia due to organism    5. Acute renal failure, unspecified acute renal failure type [facility])      This patient's ED course included: a personal history and physicial examination  This patient has remained hemodynamically stable during their ED course. Plan   Admit to telemetry. Patient condition is fair. New Medications     New Prescriptions    No medications on file     Electronically signed by Love Noe DO   DD: 11/1/20  **This report was transcribed using voice recognition software. Every effort was made to ensure accuracy; however, inadvertent computerized transcription errors may be present.   END OF PROVIDER NOTE        Love Noe DO  11/01/20 4112

## 2020-11-01 NOTE — ED NOTES
Received call from Kenneth Hercules in lab, green top was hemolyzed, reordered, will need recollected     Justen Dejesus, PHYLLIS  11/01/20 3668

## 2020-11-01 NOTE — ED NOTES
AMILCAR faxed to floor and Félix Gomez notified that pt is in transport     Kandy Zhu, [address]  11/01/20 3375

## 2020-11-01 NOTE — CONSULTS
Department of Internal Medicine  Infectious Diseases   Consult Note      Reason for Consult:  COVID 19 pneumonia       Requesting Physician:  Dr Kaelyn Lehman:                The patient is a 80 y.o. female with hx of RA , HTN , from  [address] [place] [address] presented to the ER with shortness of breath, hypoxia . Apprently,she was tested +ve for COVID 18 . She was hypoxic , oxygen sat ~ 86% . No fever  WBC 12.9K, Ferritin 997, procalcitonin 1.1  Chest x ray - bilateral infiltrates   Oxygen saturation was 91 % on 6 L of NC oxygen       Past Medical History:    Past Medical History:   Diagnosis Date    Anemia     Cellulitis     GERD (gastroesophageal reflux disease)     Glaucoma     Hypertension     Hypokalemia     Insomnia     Osteoarthritis     Rheumatoid arthritis(714.0)      Past Surgical History:      Past Surgical History:   Procedure Laterality Date    EYE SURGERY      cataracts    JOINT REPLACEMENT Left     OTHER SURGICAL HISTORY Right 8/7/2015    IM nailing right femor     Current Medications:      Current Facility-Administered Medications   Medication Dose Route Frequency Provider Last Rate Last Dose    acetaminophen (TYLENOL) tablet 650 mg  650 mg Oral Q6H PRN Terisa Nickel, DO        Or    acetaminophen (TYLENOL) suppository 650 mg  650 mg Rectal Q6H PRN Terisa Nickel, DO        heparin (porcine) injection 5,000 Units  5,000 Units Subcutaneous 3 times per day Paige Huitron DO        guaiFENesin-dextromethorphan (ROBITUSSIN DM) 100-10 MG/5ML syrup 5 mL  5 mL Oral Q4H PRN Terisa Nickel, DO        [START ON 11/2/2020] dexamethasone (DECADRON) tablet 6 mg  6 mg Oral Daily Paige Huitron DO        dextrose 5 % solution   Intravenous Continuous Terisa Nickel, DO           Allergies:  Food    Social History:    Social History     Socioeconomic History    Marital status:       Spouse name: Not on file    Number of children: Not on file    Years of education: Not on file    Highest education level: Not on file   Occupational History    Not on file   Social Needs    Financial resource strain: Not on file    Food insecurity     Worry: Not on file     Inability: Not on file    Transportation needs     Medical: Not on file     Non-medical: Not on file   Tobacco Use    Smoking status: Former Smoker    Smokeless tobacco: Never Used   Substance and Sexual Activity    Alcohol use: No    Drug use: No    Sexual activity: Not on file   Lifestyle    Physical activity     Days per week: Not on file     Minutes per session: Not on file    Stress: Not on file   Relationships    Social connections     Talks on phone: Not on file     Gets together: Not on file     Attends Judaism service: Not on file     Active member of club or organization: Not on file     Attends meetings of clubs or organizations: Not on file     Relationship status: Not on file    Intimate partner violence     Fear of current or ex partner: Not on file     Emotionally abused: Not on file     Physically abused: Not on file     Forced sexual activity: Not on file   Other Topics Concern    Not on file   Social History Narrative    Not on file     Family History:     Family History   Problem Relation Age of Onset    Heart Failure Mother        REVIEW OF SYSTEMS:  Could not be obtained       PHYSICAL EXAM:      Vitals:     Vitals:    11/01/20 0845   BP: 119/81   Pulse: 96   Resp: 24   Temp: 97.1 °F (36.2 °C)   SpO2: 91%       General Appearance:    Awake, non communicating . Head:    Normocephalic, atraumatic   Eyes:    No pallor, no icterus,   Ears:    No obvious deformity or drainage.    Nose:   No nasal drainage   Throat:  Dry oral mucosa    Neck:   Supple, no lymphadenopathy   Back:     no CVA tenderness   Lungs:     Clear to auscultation bilaterally,   Heart:    Tachycardic    Abdomen:     Soft, non-tender, bowel sounds present    Extremities:   No edema, no cyanosis Pulses:   Dorsalis pedis palpable    Skin:   no rashes or lesions     CBC with Differential:      Lab Results   Component Value Date    WBC 12.9 11/01/2020    RBC 5.51 11/01/2020    HGB 14.7 11/01/2020    HCT 46.8 11/01/2020     11/01/2020    MCV 84.9 11/01/2020    MCH 26.7 11/01/2020    MCHC 31.4 11/01/2020    RDW 15.4 11/01/2020    SEGSPCT 58 05/06/2013    LYMPHOPCT 20.0 11/01/2020    MONOPCT 5.3 11/01/2020    BASOPCT 0.4 11/01/2020    MONOSABS 0.68 11/01/2020    LYMPHSABS 2.58 11/01/2020    EOSABS 0.02 11/01/2020    BASOSABS 0.05 11/01/2020       CMP     Lab Results   Component Value Date     11/01/2020    K 4.4 11/01/2020     11/01/2020    CO2 23 11/01/2020     11/01/2020    CREATININE 3.0 11/01/2020    GFRAA 18 11/01/2020    LABGLOM 15 11/01/2020    GLUCOSE 106 11/01/2020    GLUCOSE 87 04/10/2012    PROT 7.8 11/01/2020    LABALBU 3.1 11/01/2020    LABALBU 4.1 04/10/2012    CALCIUM 9.2 11/01/2020    BILITOT 0.4 11/01/2020    ALKPHOS 63 11/01/2020    AST 30 11/01/2020    ALT 8 11/01/2020         Hepatic Function Panel:    Lab Results   Component Value Date    ALKPHOS 63 11/01/2020    ALT 8 11/01/2020    AST 30 11/01/2020    PROT 7.8 11/01/2020    BILITOT 0.4 11/01/2020    LABALBU 3.1 11/01/2020    LABALBU 4.1 04/10/2012       U/A:    Lab Results   Component Value Date    COLORU Yellow 10/22/2015    PHUR 5.5 10/22/2015    WBCUA 0-1 10/04/2015    WBCUA NONE 11/10/2010    RBCUA NONE 10/04/2015    RBCUA NONE 11/10/2010    BACTERIA MANY 10/04/2015    CLARITYU Clear 10/22/2015    SPECGRAV >=1.030 10/22/2015    LEUKOCYTESUR Negative 10/22/2015    UROBILINOGEN 0.2 10/22/2015    BILIRUBINUR Negative 10/22/2015    BILIRUBINUR NEGATIVE 11/10/2010    BLOODU Negative 10/22/2015    GLUCOSEU Negative 10/22/2015    GLUCOSEU NEGATIVE 11/10/2010       MICROBIOLOGY:    COVID +ve at Nursing home     Radiology :    Chest X ray : Bilateral infiltrates       IMPRESSION:     1. COVID 19 pneumonia   2.

## 2020-11-02 ENCOUNTER — APPOINTMENT (OUTPATIENT)
Dept: GENERAL RADIOLOGY | Age: 85
DRG: 177 | End: 2020-11-02
Payer: COMMERCIAL

## 2020-11-02 LAB
ALBUMIN SERPL-MCNC: 2.8 G/DL (ref 3.5–5.2)
ALP BLD-CCNC: 57 U/L (ref 35–104)
ALT SERPL-CCNC: 9 U/L (ref 0–32)
ANION GAP SERPL CALCULATED.3IONS-SCNC: 19 MMOL/L (ref 7–16)
APTT: 31.9 SEC (ref 24.5–35.1)
AST SERPL-CCNC: 24 U/L (ref 0–31)
BASOPHILS ABSOLUTE: 0.04 E9/L (ref 0–0.2)
BASOPHILS RELATIVE PERCENT: 0.5 % (ref 0–2)
BILIRUB SERPL-MCNC: 0.3 MG/DL (ref 0–1.2)
BUN BLDV-MCNC: 79 MG/DL (ref 8–23)
CALCIUM SERPL-MCNC: 8.6 MG/DL (ref 8.6–10.2)
CHLORIDE BLD-SCNC: 114 MMOL/L (ref 98–107)
CO2: 18 MMOL/L (ref 22–29)
CREAT SERPL-MCNC: 1.1 MG/DL (ref 0.5–1)
EOSINOPHILS ABSOLUTE: 0 E9/L (ref 0.05–0.5)
EOSINOPHILS RELATIVE PERCENT: 0 % (ref 0–6)
FIBRINOGEN: >700 MG/DL (ref 225–540)
GFR AFRICAN AMERICAN: 56
GFR NON-AFRICAN AMERICAN: 46 ML/MIN/1.73
GLUCOSE BLD-MCNC: 224 MG/DL (ref 74–99)
HCT VFR BLD CALC: 41.6 % (ref 34–48)
HEMOGLOBIN: 13 G/DL (ref 11.5–15.5)
IMMATURE GRANULOCYTES #: 0.4 E9/L
IMMATURE GRANULOCYTES %: 4.5 % (ref 0–5)
INR BLD: 1.1
LACTATE DEHYDROGENASE: 450 U/L (ref 135–214)
LYMPHOCYTES ABSOLUTE: 0.81 E9/L (ref 1.5–4)
LYMPHOCYTES RELATIVE PERCENT: 9.2 % (ref 20–42)
MCH RBC QN AUTO: 26.5 PG (ref 26–35)
MCHC RBC AUTO-ENTMCNC: 31.3 % (ref 32–34.5)
MCV RBC AUTO: 84.7 FL (ref 80–99.9)
MONOCYTES ABSOLUTE: 0.51 E9/L (ref 0.1–0.95)
MONOCYTES RELATIVE PERCENT: 5.8 % (ref 2–12)
NEUTROPHILS ABSOLUTE: 7.06 E9/L (ref 1.8–7.3)
NEUTROPHILS RELATIVE PERCENT: 80 % (ref 43–80)
PDW BLD-RTO: 15.3 FL (ref 11.5–15)
PLATELET # BLD: 297 E9/L (ref 130–450)
PMV BLD AUTO: 10.9 FL (ref 7–12)
POTASSIUM REFLEX MAGNESIUM: 3.6 MMOL/L (ref 3.5–5)
PROTHROMBIN TIME: 12.4 SEC (ref 9.3–12.4)
RBC # BLD: 4.91 E12/L (ref 3.5–5.5)
SODIUM BLD-SCNC: 151 MMOL/L (ref 132–146)
TOTAL PROTEIN: 6.6 G/DL (ref 6.4–8.3)
WBC # BLD: 8.8 E9/L (ref 4.5–11.5)

## 2020-11-02 PROCEDURE — 6360000002 HC RX W HCPCS: Performed by: INTERNAL MEDICINE

## 2020-11-02 PROCEDURE — 2580000003 HC RX 258: Performed by: INTERNAL MEDICINE

## 2020-11-02 PROCEDURE — 97535 SELF CARE MNGMENT TRAINING: CPT

## 2020-11-02 PROCEDURE — 2500000003 HC RX 250 WO HCPCS: Performed by: INTERNAL MEDICINE

## 2020-11-02 PROCEDURE — 2700000000 HC OXYGEN THERAPY PER DAY

## 2020-11-02 PROCEDURE — 85025 COMPLETE CBC W/AUTO DIFF WBC: CPT

## 2020-11-02 PROCEDURE — 85730 THROMBOPLASTIN TIME PARTIAL: CPT

## 2020-11-02 PROCEDURE — 97166 OT EVAL MOD COMPLEX 45 MIN: CPT

## 2020-11-02 PROCEDURE — 97530 THERAPEUTIC ACTIVITIES: CPT

## 2020-11-02 PROCEDURE — 85610 PROTHROMBIN TIME: CPT

## 2020-11-02 PROCEDURE — 2580000003 HC RX 258

## 2020-11-02 PROCEDURE — 71045 X-RAY EXAM CHEST 1 VIEW: CPT

## 2020-11-02 PROCEDURE — 2140000000 HC CCU INTERMEDIATE R&B

## 2020-11-02 PROCEDURE — 85384 FIBRINOGEN ACTIVITY: CPT

## 2020-11-02 PROCEDURE — 80053 COMPREHEN METABOLIC PANEL: CPT

## 2020-11-02 RX ORDER — DONEPEZIL HYDROCHLORIDE 5 MG/1
5 TABLET, ORALLY DISINTEGRATING ORAL 2 TIMES DAILY
COMMUNITY
End: 2021-02-02 | Stop reason: DRUGHIGH

## 2020-11-02 RX ORDER — QUETIAPINE FUMARATE 25 MG/1
25 TABLET, FILM COATED ORAL 2 TIMES DAILY WITH MEALS
COMMUNITY

## 2020-11-02 RX ORDER — LATANOPROST 50 UG/ML
1 SOLUTION/ DROPS OPHTHALMIC NIGHTLY
COMMUNITY

## 2020-11-02 RX ORDER — METOPROLOL TARTRATE 5 MG/5ML
2.5 INJECTION INTRAVENOUS ONCE
Status: COMPLETED | OUTPATIENT
Start: 2020-11-02 | End: 2020-11-02

## 2020-11-02 RX ORDER — MEMANTINE HYDROCHLORIDE 14 MG/1
14 CAPSULE, EXTENDED RELEASE ORAL DAILY
COMMUNITY

## 2020-11-02 RX ORDER — SODIUM CHLORIDE 0.9 % (FLUSH) 0.9 %
SYRINGE (ML) INJECTION
Status: COMPLETED
Start: 2020-11-02 | End: 2020-11-02

## 2020-11-02 RX ORDER — HYDROXYZINE PAMOATE 25 MG/1
25 CAPSULE ORAL 2 TIMES DAILY
Status: ON HOLD | COMMUNITY
End: 2021-02-10 | Stop reason: HOSPADM

## 2020-11-02 RX ORDER — DIVALPROEX SODIUM 125 MG/1
125 TABLET, DELAYED RELEASE ORAL 2 TIMES DAILY
COMMUNITY

## 2020-11-02 RX ORDER — POLYETHYLENE GLYCOL 3350 17 G/17G
17 POWDER, FOR SOLUTION ORAL
Status: ON HOLD | COMMUNITY
End: 2021-02-10 | Stop reason: HOSPADM

## 2020-11-02 RX ORDER — PANTOPRAZOLE SODIUM 40 MG/1
40 TABLET, DELAYED RELEASE ORAL DAILY
COMMUNITY
End: 2021-02-02 | Stop reason: ALTCHOICE

## 2020-11-02 RX ORDER — VIT C/B6/B5/MAGNESIUM/HERB 173 50-5-6-5MG
1 CAPSULE ORAL DAILY
Status: ON HOLD | COMMUNITY
End: 2021-02-10 | Stop reason: HOSPADM

## 2020-11-02 RX ORDER — UBIDECARENONE 100 MG
100 CAPSULE ORAL EVERY EVENING
Status: ON HOLD | COMMUNITY
End: 2021-02-10 | Stop reason: HOSPADM

## 2020-11-02 RX ORDER — ACETAMINOPHEN, ASPIRIN AND CAFFEINE 250; 250; 65 MG/1; MG/1; MG/1
12 TABLET, FILM COATED ORAL EVERY 6 HOURS PRN
COMMUNITY
End: 2021-02-02 | Stop reason: ALTCHOICE

## 2020-11-02 RX ORDER — DEXTROSE AND SODIUM CHLORIDE 5; .45 G/100ML; G/100ML
INJECTION, SOLUTION INTRAVENOUS CONTINUOUS
Status: DISCONTINUED | OUTPATIENT
Start: 2020-11-02 | End: 2020-11-03

## 2020-11-02 RX ORDER — HEPARIN SODIUM 10000 [USP'U]/ML
5000 INJECTION, SOLUTION INTRAVENOUS; SUBCUTANEOUS EVERY 8 HOURS SCHEDULED
Status: DISCONTINUED | OUTPATIENT
Start: 2020-11-02 | End: 2020-11-14 | Stop reason: HOSPADM

## 2020-11-02 RX ORDER — CHOLECALCIFEROL (VITAMIN D3) 125 MCG
5 CAPSULE ORAL NIGHTLY
Status: ON HOLD | COMMUNITY
End: 2021-02-10 | Stop reason: HOSPADM

## 2020-11-02 RX ADMIN — DEXAMETHASONE SODIUM PHOSPHATE 6 MG: 4 INJECTION, SOLUTION INTRAMUSCULAR; INTRAVENOUS at 08:52

## 2020-11-02 RX ADMIN — HEPARIN SODIUM 5000 UNITS: 10000 INJECTION INTRAVENOUS; SUBCUTANEOUS at 05:28

## 2020-11-02 RX ADMIN — SODIUM CHLORIDE, PRESERVATIVE FREE 10 ML: 5 INJECTION INTRAVENOUS at 08:52

## 2020-11-02 RX ADMIN — HEPARIN SODIUM 5000 UNITS: 10000 INJECTION INTRAVENOUS; SUBCUTANEOUS at 13:19

## 2020-11-02 RX ADMIN — ANTI-FUNGAL POWDER MICONAZOLE NITRATE TALC FREE: 1.42 POWDER TOPICAL at 20:09

## 2020-11-02 RX ADMIN — IPRATROPIUM BROMIDE AND ALBUTEROL 1 PUFF: 20; 100 SPRAY, METERED RESPIRATORY (INHALATION) at 07:42

## 2020-11-02 RX ADMIN — IPRATROPIUM BROMIDE AND ALBUTEROL 1 PUFF: 20; 100 SPRAY, METERED RESPIRATORY (INHALATION) at 00:00

## 2020-11-02 RX ADMIN — SODIUM CHLORIDE 3 G: 900 INJECTION INTRAVENOUS at 17:20

## 2020-11-02 RX ADMIN — LATANOPROST 1 DROP: 50 SOLUTION OPHTHALMIC at 22:03

## 2020-11-02 RX ADMIN — IPRATROPIUM BROMIDE AND ALBUTEROL 1 PUFF: 20; 100 SPRAY, METERED RESPIRATORY (INHALATION) at 04:07

## 2020-11-02 RX ADMIN — DEXTROSE AND SODIUM CHLORIDE: 5; 450 INJECTION, SOLUTION INTRAVENOUS at 07:42

## 2020-11-02 RX ADMIN — REMDESIVIR 100 MG: 100 INJECTION, POWDER, LYOPHILIZED, FOR SOLUTION INTRAVENOUS at 18:11

## 2020-11-02 RX ADMIN — Medication: at 16:07

## 2020-11-02 RX ADMIN — IPRATROPIUM BROMIDE AND ALBUTEROL 1 PUFF: 20; 100 SPRAY, METERED RESPIRATORY (INHALATION) at 20:09

## 2020-11-02 RX ADMIN — IPRATROPIUM BROMIDE AND ALBUTEROL 1 PUFF: 20; 100 SPRAY, METERED RESPIRATORY (INHALATION) at 12:03

## 2020-11-02 RX ADMIN — HEPARIN SODIUM 5000 UNITS: 10000 INJECTION INTRAVENOUS; SUBCUTANEOUS at 22:10

## 2020-11-02 RX ADMIN — IPRATROPIUM BROMIDE AND ALBUTEROL 1 PUFF: 20; 100 SPRAY, METERED RESPIRATORY (INHALATION) at 16:19

## 2020-11-02 RX ADMIN — ANTI-FUNGAL POWDER MICONAZOLE NITRATE TALC FREE: 1.42 POWDER TOPICAL at 08:52

## 2020-11-02 RX ADMIN — SODIUM CHLORIDE, PRESERVATIVE FREE 10 ML: 5 INJECTION INTRAVENOUS at 16:19

## 2020-11-02 RX ADMIN — IPRATROPIUM BROMIDE AND ALBUTEROL 1 PUFF: 20; 100 SPRAY, METERED RESPIRATORY (INHALATION) at 23:49

## 2020-11-02 RX ADMIN — METOPROLOL TARTRATE 2.5 MG: 5 INJECTION INTRAVENOUS at 16:17

## 2020-11-02 ASSESSMENT — PAIN SCALES - PAIN ASSESSMENT IN ADVANCED DEMENTIA (PAINAD)
CONSOLABILITY: 0
BODYLANGUAGE: 0
BODYLANGUAGE: 1
FACIALEXPRESSION: 0
BODYLANGUAGE: 0
CONSOLABILITY: 0
BODYLANGUAGE: 0
BREATHING: 0
NEGVOCALIZATION: 1
FACIALEXPRESSION: 0
TOTALSCORE: 3
NEGVOCALIZATION: 1
CONSOLABILITY: 0
TOTALSCORE: 1
NEGVOCALIZATION: 1
FACIALEXPRESSION: 0
TOTALSCORE: 1
BREATHING: 0
NEGVOCALIZATION: 1
BODYLANGUAGE: 0
FACIALEXPRESSION: 0
FACIALEXPRESSION: 0
BREATHING: 0
BREATHING: 0
NEGVOCALIZATION: 1
BREATHING: 0
FACIALEXPRESSION: 0
BODYLANGUAGE: 0
CONSOLABILITY: 0
TOTALSCORE: 1
BREATHING: 0
TOTALSCORE: 1
NEGVOCALIZATION: 1
BREATHING: 0
BODYLANGUAGE: 1
FACIALEXPRESSION: 0
TOTALSCORE: 1
NEGVOCALIZATION: 1
BREATHING: 0
CONSOLABILITY: 0
NEGVOCALIZATION: 1
BODYLANGUAGE: 0
BODYLANGUAGE: 0
TOTALSCORE: 1
TOTALSCORE: 1
NEGVOCALIZATION: 1
FACIALEXPRESSION: 0
BREATHING: 0
NEGVOCALIZATION: 1
BREATHING: 0
FACIALEXPRESSION: 0
CONSOLABILITY: 0
TOTALSCORE: 2
CONSOLABILITY: 0
CONSOLABILITY: 0
FACIALEXPRESSION: 0
CONSOLABILITY: 1
CONSOLABILITY: 0
BODYLANGUAGE: 0
TOTALSCORE: 1

## 2020-11-02 NOTE — PLAN OF CARE
improved sensory functioning will increase  11/2/2020 0504 by Parisa Tariq RN  Outcome: Not Met This Shift  11/1/2020 1937 by Anais Blanco RN  Outcome: Not Met This Shift  Goal: Decrease in sensory misperception frequency  11/1/2020 1937 by Anais Blanco RN  Outcome: Not Met This Shift  Goal: Able to refrain from responding to false sensory perceptions  11/1/2020 1937 by Anais Blanco RN  Outcome: Not Met This Shift  Goal: Demonstrates accurate environmental perceptions  11/1/2020 1937 by Anais Blanco RN  Outcome: Not Met This Shift  Goal: Able to distinguish between reality-based and nonreality-based thinking  11/1/2020 1937 by Anais Blanco RN  Outcome: Not Met This Shift  Goal: Able to interrupt nonreality-based thinking  11/1/2020 1937 by Anais Blanco RN  Outcome: Not Met This Shift     Problem: Psychomotor Activity - Altered:  Goal: Absence of psychomotor disturbance signs and symptoms  11/1/2020 1937 by Anais Blanco RN  Outcome: Not Met This Shift     Problem: Sleep Pattern Disturbance:  Goal: Appears well-rested  11/1/2020 1937 by Anais Blanco RN  Outcome: Met This Shift     Problem: Pain:  Goal: Control of acute pain  Outcome: Met This Shift     Problem: Airway Clearance - Ineffective  Goal: Achieve or maintain patent airway  Outcome: Met This Shift     Problem:  Body Temperature -  Risk of, Imbalanced  Goal: Ability to maintain a body temperature within defined limits  Outcome: Met This Shift  Goal: Complications related to the disease process, condition or treatment will be avoided or minimized  Outcome: Met This Shift     Problem: Breathing Pattern - Ineffective  Goal: Ability to achieve and maintain a regular respiratory rate  Outcome: Met This Shift

## 2020-11-02 NOTE — PROGRESS NOTES
Patient took about 3 very small bites of jello and a few sips of water without a straw. No cough. Pt. Refused more jello and spit out additional attempts. Mouth swabbed, but patient refused to sip water from a cup or take additional fluids throughout the night.

## 2020-11-02 NOTE — PROGRESS NOTES
supple, symmetrical, trachea midline, thyroid not enlarged  Lung: scattered rhonchi; no use of accessory muscles; no rales, or crackles  Heart:  regular rate and regular rhythm without murmur, rub, or gallop  Abdomen:  soft, nontender, nondistended; normoactive bowel sounds; no organomegaly  Extremities:  extremities normal, atraumatic, no cyanosis or edema  Musculokeletal:  no joint swelling, no muscle tenderness. ROM normal in all joints of extremities. Neurologic:  mental status A&Ox1, CN II-XII grossly intact; sensation intact, motor strength 5/5 globally;     ASSESSMENT and PLAN:  · Acute hypoxic respiratory failure secondary to COVID-19 viral pneumonia-infectious diseases been consulted. Decadron daily. Covid labs. Remdesivir. Pulmonary also following. ?aspiration pneumonia, also started on unasyn  · Acute kidney injury- likely secondary to dehydration. Continue IV fluids  · Hypernatremia-hypotonic fluids. Will recheck BMP and adjust fluids based on results. I's and O's. · Elevated troponin in the setting of acute kidney injury         DISPOSITION: Continue current plan of care. Updated daughter on guarded prognosis.      Medications:  REVIEWED DAILY    Infusion Medications    dextrose 5 % and 0.45 % NaCl       Scheduled Medications    miconazole   Topical BID    heparin (porcine)  5,000 Units Subcutaneous 3 times per day    remdesivir IVPB  100 mg Intravenous Q24H    dexamethasone  6 mg Intravenous Daily    latanoprost  1 drop Both Eyes Nightly    albuterol-ipratropium  1 puff Inhalation 6 times per day     PRN Meds: acetaminophen **OR** acetaminophen, guaiFENesin-dextromethorphan, sodium chloride, bisacodyl    Labs:     Recent Labs     11/01/20  0440 11/02/20  0335   WBC 12.9* 8.8   HGB 14.7 13.0   HCT 46.8 41.6    297       Recent Labs     11/01/20  0601 11/01/20  1812 11/02/20  0335   * 151* 151*   K 4.4 3.9 3.6   * 115* 114*   CO2 23 22 18*   * 89* 79*   CREATININE 3. 0* 1.6* 1.1*   CALCIUM 9.2 8.4* 8.6       Recent Labs     11/01/20  0601 11/02/20  0335   PROT 7.8 6.6   ALKPHOS 63 57   ALT 8 9   AST 30 24   BILITOT 0.4 0.3       Recent Labs     11/02/20  0335   INR 1.1       Recent Labs     11/01/20  0601 11/01/20  1615   TROPONINI 0.09* <0.01       Chronic labs:    Lab Results   Component Value Date    CHOL 149 05/03/2013    TRIG 69 05/03/2013    HDL 56.0 05/03/2013    LDLCALC 79 05/03/2013    TSH 1.230 08/08/2015    INR 1.1 11/02/2020    LABA1C 5.8 05/14/2013       Radiology: REVIEWED DAILY    +++++++++++++++++++++++++++++++++++++++++++++++++  Karli Raymond DO  [employer] - [address] [place], [place]  +++++++++++++++++++++++++++++++++++++++++++++++++  NOTE: This report was transcribed using voice recognition software. Every effort was made to ensure accuracy; however, inadvertent computerized transcription errors may be present.

## 2020-11-02 NOTE — PROGRESS NOTES
Occupational Therapy  OCCUPATIONAL THERAPY INITIAL EVALUATION      Date:2020  Patient Name: Remy Tolentino  MRN: [id number]  : 1925  Room: [address] [place] [address]    Referring Provider:  Chin Cross DO     Evaluating OT: Cecil Berumen OTR/L #[id number]     AM-PAC Daily Activity Raw Score:     Recommended Adaptive Equipment:  TBD     Diagnosis: COVID-19   Patient presented to the hospital for SOB and hypoxia      Pertinent Medical History:    Past Medical History:   Diagnosis Date    Anemia     Cellulitis     GERD (gastroesophageal reflux disease)     Glaucoma     Hypertension     Hypokalemia     Insomnia     Osteoarthritis     Rheumatoid arthritis(714.0)       Precautions:  Falls, Droplet plus (COVID-19),  15L NRB, continuous pulse ox, TAPS, cognition, bed alarm     Home Living: Pt admitted from [facility]   Bathroom setup: ?  Equipment owned: ?    Prior Level of Function: ? with ADLs , ? Level of assist for transfers / ambulation  Driving: na  Occupation: na    Pain Level: Pt denies pain this session    Cognition: A&O: 1/4 (self only);  Follows 1 step directions with max cuing (less than 10% of time)   Memory:  poor   Sequencing:  poor   Problem solving:  poor   Judgement/safety:  poor     Functional Assessment:   Initial Eval Status  Date: 20 Treatment Status  Date: Short Term Goals = Long Term Goals  Treatment frequency: 1-4x/wk; PRN   Feeding NPO       Grooming Maximal Assist     Hand over hand assist; task initiation   Minimal Assist    UB Dressing Dependent   Moderate Assist    LB Dressing Dependent   Maximal Assist    Bathing Dependent  Maximal Assist    Toileting Dependent   Maximal Assist    Bed Mobility  Rolling:  Dependent  Supine to sit: Dependent   Sit to supine: Dependent   Rolling: mod A  Supine to sit: Maximal Assist   Sit to supine: Maximal Assist    Functional Transfers NT   Maximal Assist    Functional Mobility NT       Balance Sitting:     Static:  Dep    + retropulsion Activity Tolerance P  F   Visual/  Perceptual Continue to assess                  Vitals:  15L NRB  Rest: O2 sat 92%,   ADL, supine<>sit: O2 sat 93%,   Rolling, repositioning:  O2 sat 94%,   Rest semi-supine inbed:  O2 sat 93%,       Hand dominance: right     Strength ROM Additional Info:    RUE   3+/5 Grossly wfl good  and poor+ FMC/dexterity noted during ADL tasks     LUE Resistive  Resistive with ROM good  and poor+ FMC/dexterity noted during ADL tasks   Unable to assess B UE strength / ROM due to cognition. Good B UE grasp, resistive toward other movements / commands    Hearing: grossly wfl  Sensation: continue to assess  Tone: wfl  Edema:none noted                             Comments: Upon arrival, patient semi-supine in bed and agreeable to OT Session - nursing clearance obtained prior to session. Pt demonstrating poor understanding of education/techniques, requiring additional training / education. At end of session, patient semi-supine in bed with call light and phone within reach, all lines and tubes intact (TAPS wedges positioned). Pt would benefit from continued skilled OT to increase safety,  independence and quality of life. Treatment:  OT services provided: Facilitation of bed mobility (rolling, supine<>sit), sitting balance at EOB (dependent; impacting ADLs; addressing posture and weight shifting) and skilled repositioning addressing joint and skin integrity - skilled cuing on sequencing, hand placement, posture, body mechanics, energy conservation techniques and safety. B UE ROM completed in all planes; pt resistive toward movements (L more resistive than R). Therapist facilitated self-care retraining: UB/LB self-care tasks (gown, partial bathing, socks), toileting hygiene task and semi-supine grooming tasks (hand over hand assist) while cuing on sequencing, techniques, posture, safety.  Skilled monitoring of HR, O2 sats and pts response to treatment (see above)       Assessment of current deficits    Functional mobility [x]  ADLs [x] Strength [x]  Cognition [x]  Functional transfers  [x] IADLs [x] Safety Awareness [x]  Endurance [x]  Fine Motor Coordination [x] Balance [x] Vision/perception [] Sensation []   Gross Motor Coordination [x] ROM [] Delirium []                  Motor Control []      Plan of Care:  1-4 days/wk for 1-2 weeks PRN   [x]ADL retraining: adapted techniques and AE recommendations to increase independence within precautions                    [x]Energy conservation techniques to improve tolerance for self care routine   [x]Functional transfer/mobility training for fall prevention & DME recommendations         [x]Patient/family education to increase safety and functional independence             [x]Environmental modifications for safe mobility and completion of ADLs                             []Cognitive retraining ex's to improve problem solving skills & safe participation in ADLs/IADLs     []Sensory re-education techniques to improve extremity awareness, maintain skin integrity and improve hand function                             []Visual/Perceptual retraining ex's to improve body awareness and safety during transfers and ADLs  []Splinting/postioning needs to maintain joint/skin integrity and prevent contractures  [x]Therapeutic activity to improve functional skills. [x]Therapeutic exercise to improve tolerance for ADLs; [facility] skills  [x]Balance retraining ex's for postural control during ADLs with dynamic challenges.   [x]Neuromuscular re-education: facilitation of righting/equilibrium reactions, midline orientation, scapular stability/mobility, Normalization muscle tone/active functional    movement/Attention                         []Delirium prevention/treatment    [x]Positioning to improve functional independence and skin integrity  []Other:       Rehab Potential: Good for established goals     Patient / Family Goal: none stated      Patient and/or family were instructed on functional diagnosis, prognosis/goals and OT plan of care. Demonstrated poor understanding. AM-PAC Daily Activity Inpatient   How much help for putting on and taking off regular lower body clothing?: Total  How much help for Bathing?: Total  How much help for Toileting?: Total  How much help for putting on and taking off regular upper body clothing?: Total  How much help for taking care of personal grooming?: A Lot  How much help for eating meals?: Total  AM-PAC Inpatient Daily Activity Raw Score: 7  AM-PAC Inpatient ADL T-Scale Score : 20.13  ADL Inpatient CMS 0-100% Score: 92.44  ADL Inpatient CMS G-Code Modifier : CM         Eval Complexity: mod  Profile and History- med (extensive chart review)  Assessment of Occupational Performance and Identification of Deficits- med  Clinical Decision Making- med     Time In: 1420  Time Out: 1450  Total Treatment Time: 24    Min Units   OT Eval Low 49629       OT Eval Medium 97166  x 1   OT Eval High 88872       OT Re-Eval [id number]       Therapeutic Ex 03460       Therapeutic Activities 37886  11  1   ADL/Self Care 26806  13  1   Orthotic Management 56009       Neuro Re-Ed 45333       Non-Billable Time          Evaluation Time includes thorough review of current medical information, gathering information on past medical history/social history and prior level of function, completion of standardized testing/informal observation of tasks, assessment of data and education on plan of care and goals.            Danita Seip OTR/L #[id number]

## 2020-11-02 NOTE — CARE COORDINATION
SOCIAL WORK/DISCHARGE PLANNING;  Pt was admitted from [facility]. I spoke with Britni Hansen at [facility]. Pt can return when medically stable and 02 needs are much lower. (She is currently  on 15L NRB). Also spoke with pt's daughter Malathi([phone]), . She resides in [place]. Pt's son is local but Chantal Linares is pt's poa. Per daughter, prior to admission, pt was in the wheelchair. She had been receiving therapy from [address] [place] [address] prior to testing positive for covid. Per daughter, pt sees a Nurse Practioner there on a monthly basis. Plan will be for pt to return to [facility] at discharge. Will follow for care coordination.    Delilah HUERTA  [phone]

## 2020-11-02 NOTE — PROGRESS NOTES
Department of Internal Medicine  Infectious Diseases  Progress  Note      C/C :  COVID 19 pneumonia     Pt is awake , non communicating  On High flow oxygen   Afebrile       Current Facility-Administered Medications   Medication Dose Route Frequency Provider Last Rate Last Dose    miconazole (MICOTIN) 2 % powder   Topical BID Paige Huitron DO        heparin (porcine) injection 5,000 Units  5,000 Units Subcutaneous 3 times per day Celestino Hendricks DO   5,000 Units at 11/02/20 1319    dextrose 5 % and 0.45 % sodium chloride infusion   Intravenous Continuous Celestino Hendricks DO 75 mL/hr at 11/02/20 9012      acetaminophen (TYLENOL) tablet 650 mg  650 mg Oral Q6H PRN Celestino Hendricks DO        Or    acetaminophen (TYLENOL) suppository 650 mg  650 mg Rectal Q6H PRN Paige Huitron DO        guaiFENesin-dextromethorphan (ROBITUSSIN DM) 100-10 MG/5ML syrup 5 mL  5 mL Oral Q4H PRN Celestino Hendricks DO        remdesivir 100 mg in sodium chloride 0.9 % 250 mL IVPB  100 mg Intravenous Q24H Eric P MD Nathan        0.9 % sodium chloride bolus  30 mL Intravenous PRN Eric P MD Nathan        dexamethasone (DECADRON) injection 6 mg  6 mg Intravenous Daily Paige Huitron DO   6 mg at 11/02/20 0852    latanoprost (XALATAN) 0.005 % ophthalmic solution 1 drop  1 drop Both Eyes Nightly Paige Huitron DO   1 drop at 11/01/20 2133    bisacodyl (DULCOLAX) suppository 10 mg  10 mg Rectal PRN Paige Huitron DO        albuterol-ipratropium (COMBIVENT RESPIMAT)  MCG/ACT inhaler 1 puff  1 puff Inhalation 6 times per day Celestino Hendricks DO   1 puff at 11/02/20 1203         REVIEW OF SYSTEMS:  Could not be obtained       PHYSICAL EXAM:      Vitals:     Vitals:    11/02/20 1151   BP: 130/77   Pulse: 108   Resp: 22   Temp: 97.1 °F (36.2 °C)   SpO2: 95%       General Appearance:    Awake, non communicating .    Head:    Normocephalic, atraumatic   Eyes:    No pallor, no icterus,   Ears:    No obvious deformity or drainage.    Nose:   No nasal drainage   Throat:  Dry oral mucosa    Neck:   Supple, no lymphadenopathy   Back:     no CVA tenderness   Lungs:     Diminished breath sound    Heart:    Tachycardic    Abdomen:     Soft, non-tender, bowel sounds present    Extremities:   No edema, no cyanosis   Pulses:   Dorsalis pedis palpable    Skin:   no rashes or lesions     CBC with Differential:      Lab Results   Component Value Date    WBC 8.8 11/02/2020    RBC 4.91 11/02/2020    HGB 13.0 11/02/2020    HCT 41.6 11/02/2020     11/02/2020    MCV 84.7 11/02/2020    MCH 26.5 11/02/2020    MCHC 31.3 11/02/2020    RDW 15.3 11/02/2020    SEGSPCT 58 05/06/2013    LYMPHOPCT 9.2 11/02/2020    MONOPCT 5.8 11/02/2020    BASOPCT 0.5 11/02/2020    MONOSABS 0.51 11/02/2020    LYMPHSABS 0.81 11/02/2020    EOSABS 0.00 11/02/2020    BASOSABS 0.04 11/02/2020       CMP     Lab Results   Component Value Date     11/02/2020    K 3.6 11/02/2020     11/02/2020    CO2 18 11/02/2020    BUN 79 11/02/2020    CREATININE 1.1 11/02/2020    GFRAA 56 11/02/2020    LABGLOM 46 11/02/2020    GLUCOSE 224 11/02/2020    GLUCOSE 87 04/10/2012    PROT 6.6 11/02/2020    LABALBU 2.8 11/02/2020    LABALBU 4.1 04/10/2012    CALCIUM 8.6 11/02/2020    BILITOT 0.3 11/02/2020    ALKPHOS 57 11/02/2020    AST 24 11/02/2020    ALT 9 11/02/2020         Hepatic Function Panel:    Lab Results   Component Value Date    ALKPHOS 57 11/02/2020    ALT 9 11/02/2020    AST 24 11/02/2020    PROT 6.6 11/02/2020    BILITOT 0.3 11/02/2020    LABALBU 2.8 11/02/2020    LABALBU 4.1 04/10/2012       U/A:    Lab Results   Component Value Date    COLORU Yellow 10/22/2015    PHUR 5.5 10/22/2015    WBCUA 0-1 10/04/2015    WBCUA NONE 11/10/2010    RBCUA NONE 10/04/2015    RBCUA NONE 11/10/2010    BACTERIA MANY 10/04/2015    CLARITYU Clear 10/22/2015    SPECGRAV >=1.030 10/22/2015    LEUKOCYTESUR Negative 10/22/2015    UROBILINOGEN 0.2 10/22/2015    BILIRUBINUR Negative

## 2020-11-02 NOTE — PROGRESS NOTES
Physical Therapy    PT order received and medical chart reviewed 11/2 AM. Per RN, pt was agitated and not currently appropriate for OOB activity. RN requested therapy to return at a later time/date. Will follow. Thank you.     Jessica Prater, PT, DPT  [id number]

## 2020-11-02 NOTE — PROGRESS NOTES
Dr. Peterson Borne notified patient requiring high flow Nasal canula 78 percent on 6 L.   96 currently on 15 L high flow

## 2020-11-02 NOTE — PLAN OF CARE
Problem: Skin Integrity:  Goal: Will show no infection signs and symptoms  Outcome: Met This Shift  Goal: Absence of new skin breakdown  Outcome: Met This Shift     Problem: Falls - Risk of:  Goal: Will remain free from falls  Outcome: Met This Shift  Goal: Absence of physical injury  Outcome: Met This Shift     Problem: Injury - Risk of, Physical Injury:  Goal: Will remain free from falls  Outcome: Met This Shift  Goal: Absence of physical injury  Outcome: Met This Shift     Problem: Sleep Pattern Disturbance:  Goal: Appears well-rested  Outcome: Met This Shift

## 2020-11-02 NOTE — PROGRESS NOTES
Carlos Link,    Your patient is on a medication that requires a renal dose adjustment. Renal Function Assessment:    Date Body Weight SCr CrCl Dialysis status   11/2/2020 52 kg 1.1 26 ml/min -       Pharmacy has renally dose-adjusted the following medication(s):    Date Medication Original Dosing Regimen New Dosing Regimen   11/2/2020 Unasyn 1.5 g q6h 3 g q12h           These changes were made per protocol according to the [facility] Renal Function-Based Dose Adjustments Policy    *Please note this dose may need readjusted if your patient's renal function significantly improves. Please contact pharmacy with any questions regarding these changes.

## 2020-11-02 NOTE — PROGRESS NOTES
Dr. Rashi Senior notified of pulmonary consult. 15 L NRB 96-97percent saturation    HF Nasal canula did not work Pt.  Is a mouth breather

## 2020-11-03 LAB
ALBUMIN SERPL-MCNC: 3 G/DL (ref 3.5–5.2)
ALP BLD-CCNC: 55 U/L (ref 35–104)
ALT SERPL-CCNC: 8 U/L (ref 0–32)
ANION GAP SERPL CALCULATED.3IONS-SCNC: 16 MMOL/L (ref 7–16)
ANISOCYTOSIS: ABNORMAL
APTT: 31.7 SEC (ref 24.5–35.1)
AST SERPL-CCNC: 19 U/L (ref 0–31)
BASOPHILS ABSOLUTE: 0 E9/L (ref 0–0.2)
BASOPHILS RELATIVE PERCENT: 0.4 % (ref 0–2)
BILIRUB SERPL-MCNC: 0.4 MG/DL (ref 0–1.2)
BUN BLDV-MCNC: 65 MG/DL (ref 8–23)
CALCIUM SERPL-MCNC: 8.8 MG/DL (ref 8.6–10.2)
CHLORIDE BLD-SCNC: 118 MMOL/L (ref 98–107)
CO2: 20 MMOL/L (ref 22–29)
CREAT SERPL-MCNC: 0.8 MG/DL (ref 0.5–1)
EOSINOPHILS ABSOLUTE: 0 E9/L (ref 0.05–0.5)
EOSINOPHILS RELATIVE PERCENT: 0 % (ref 0–6)
FIBRINOGEN: 619 MG/DL (ref 225–540)
GFR AFRICAN AMERICAN: >60
GFR NON-AFRICAN AMERICAN: >60 ML/MIN/1.73
GLUCOSE BLD-MCNC: 147 MG/DL (ref 74–99)
HBA1C MFR BLD: 5.9 % (ref 4–5.6)
HCT VFR BLD CALC: 40 % (ref 34–48)
HEMOGLOBIN: 13.2 G/DL (ref 11.5–15.5)
INR BLD: 1.2
LYMPHOCYTES ABSOLUTE: 0.45 E9/L (ref 1.5–4)
LYMPHOCYTES RELATIVE PERCENT: 3.5 % (ref 20–42)
MAGNESIUM: 2.6 MG/DL (ref 1.6–2.6)
MCH RBC QN AUTO: 26.7 PG (ref 26–35)
MCHC RBC AUTO-ENTMCNC: 33 % (ref 32–34.5)
MCV RBC AUTO: 81 FL (ref 80–99.9)
METAMYELOCYTES RELATIVE PERCENT: 0.9 % (ref 0–1)
METER GLUCOSE: 158 MG/DL (ref 74–99)
METER GLUCOSE: 175 MG/DL (ref 74–99)
MONOCYTES ABSOLUTE: 0.9 E9/L (ref 0.1–0.95)
MONOCYTES RELATIVE PERCENT: 7.8 % (ref 2–12)
MYELOCYTE PERCENT: 0.9 % (ref 0–0)
NEUTROPHILS ABSOLUTE: 10.06 E9/L (ref 1.8–7.3)
NEUTROPHILS RELATIVE PERCENT: 87 % (ref 43–80)
OVALOCYTES: ABNORMAL
PDW BLD-RTO: 15 FL (ref 11.5–15)
PLATELET # BLD: 354 E9/L (ref 130–450)
PMV BLD AUTO: 11 FL (ref 7–12)
POIKILOCYTES: ABNORMAL
POLYCHROMASIA: ABNORMAL
POTASSIUM REFLEX MAGNESIUM: 3.5 MMOL/L (ref 3.5–5)
PROTHROMBIN TIME: 13.3 SEC (ref 9.3–12.4)
RBC # BLD: 4.94 E12/L (ref 3.5–5.5)
SCHISTOCYTES: ABNORMAL
SODIUM BLD-SCNC: 154 MMOL/L (ref 132–146)
TARGET CELLS: ABNORMAL
TOTAL PROTEIN: 6.4 G/DL (ref 6.4–8.3)
WBC # BLD: 11.3 E9/L (ref 4.5–11.5)

## 2020-11-03 PROCEDURE — 2580000003 HC RX 258: Performed by: INTERNAL MEDICINE

## 2020-11-03 PROCEDURE — 2580000003 HC RX 258

## 2020-11-03 PROCEDURE — 97162 PT EVAL MOD COMPLEX 30 MIN: CPT

## 2020-11-03 PROCEDURE — 6360000002 HC RX W HCPCS: Performed by: INTERNAL MEDICINE

## 2020-11-03 PROCEDURE — 85384 FIBRINOGEN ACTIVITY: CPT

## 2020-11-03 PROCEDURE — 82962 GLUCOSE BLOOD TEST: CPT

## 2020-11-03 PROCEDURE — 2700000000 HC OXYGEN THERAPY PER DAY

## 2020-11-03 PROCEDURE — 6370000000 HC RX 637 (ALT 250 FOR IP): Performed by: INTERNAL MEDICINE

## 2020-11-03 PROCEDURE — 92610 EVALUATE SWALLOWING FUNCTION: CPT

## 2020-11-03 PROCEDURE — 80053 COMPREHEN METABOLIC PANEL: CPT

## 2020-11-03 PROCEDURE — 36415 COLL VENOUS BLD VENIPUNCTURE: CPT

## 2020-11-03 PROCEDURE — 85730 THROMBOPLASTIN TIME PARTIAL: CPT

## 2020-11-03 PROCEDURE — 83036 HEMOGLOBIN GLYCOSYLATED A1C: CPT

## 2020-11-03 PROCEDURE — 85025 COMPLETE CBC W/AUTO DIFF WBC: CPT

## 2020-11-03 PROCEDURE — 2500000003 HC RX 250 WO HCPCS: Performed by: INTERNAL MEDICINE

## 2020-11-03 PROCEDURE — 99223 1ST HOSP IP/OBS HIGH 75: CPT | Performed by: INTERNAL MEDICINE

## 2020-11-03 PROCEDURE — 2140000000 HC CCU INTERMEDIATE R&B

## 2020-11-03 PROCEDURE — 97530 THERAPEUTIC ACTIVITIES: CPT

## 2020-11-03 PROCEDURE — 83735 ASSAY OF MAGNESIUM: CPT

## 2020-11-03 PROCEDURE — 85610 PROTHROMBIN TIME: CPT

## 2020-11-03 RX ORDER — NICOTINE POLACRILEX 4 MG
15 LOZENGE BUCCAL PRN
Status: DISCONTINUED | OUTPATIENT
Start: 2020-11-03 | End: 2020-11-14 | Stop reason: HOSPADM

## 2020-11-03 RX ORDER — DEXTROSE MONOHYDRATE 25 G/50ML
12.5 INJECTION, SOLUTION INTRAVENOUS PRN
Status: DISCONTINUED | OUTPATIENT
Start: 2020-11-03 | End: 2020-11-14 | Stop reason: HOSPADM

## 2020-11-03 RX ORDER — DEXTROSE MONOHYDRATE 50 MG/ML
INJECTION, SOLUTION INTRAVENOUS CONTINUOUS
Status: DISCONTINUED | OUTPATIENT
Start: 2020-11-03 | End: 2020-11-10

## 2020-11-03 RX ORDER — SODIUM CHLORIDE 0.9 % (FLUSH) 0.9 %
SYRINGE (ML) INJECTION
Status: COMPLETED
Start: 2020-11-03 | End: 2020-11-03

## 2020-11-03 RX ORDER — DEXTROSE MONOHYDRATE 50 MG/ML
100 INJECTION, SOLUTION INTRAVENOUS PRN
Status: DISCONTINUED | OUTPATIENT
Start: 2020-11-03 | End: 2020-11-14 | Stop reason: HOSPADM

## 2020-11-03 RX ORDER — POTASSIUM CHLORIDE 7.45 MG/ML
10 INJECTION INTRAVENOUS
Status: COMPLETED | OUTPATIENT
Start: 2020-11-03 | End: 2020-11-03

## 2020-11-03 RX ADMIN — DEXTROSE MONOHYDRATE: 50 INJECTION, SOLUTION INTRAVENOUS at 09:49

## 2020-11-03 RX ADMIN — SODIUM CHLORIDE, PRESERVATIVE FREE 10 ML: 5 INJECTION INTRAVENOUS at 05:56

## 2020-11-03 RX ADMIN — IPRATROPIUM BROMIDE AND ALBUTEROL 1 PUFF: 20; 100 SPRAY, METERED RESPIRATORY (INHALATION) at 11:41

## 2020-11-03 RX ADMIN — POTASSIUM CHLORIDE 10 MEQ: 10 INJECTION, SOLUTION INTRAVENOUS at 15:00

## 2020-11-03 RX ADMIN — SODIUM CHLORIDE 3 G: 900 INJECTION INTRAVENOUS at 17:00

## 2020-11-03 RX ADMIN — ANTI-FUNGAL POWDER MICONAZOLE NITRATE TALC FREE: 1.42 POWDER TOPICAL at 09:55

## 2020-11-03 RX ADMIN — DEXAMETHASONE SODIUM PHOSPHATE 6 MG: 4 INJECTION, SOLUTION INTRAMUSCULAR; INTRAVENOUS at 09:50

## 2020-11-03 RX ADMIN — DEXTROSE AND SODIUM CHLORIDE: 5; 450 INJECTION, SOLUTION INTRAVENOUS at 03:21

## 2020-11-03 RX ADMIN — POTASSIUM CHLORIDE 10 MEQ: 10 INJECTION, SOLUTION INTRAVENOUS at 13:00

## 2020-11-03 RX ADMIN — INSULIN LISPRO 2 UNITS: 100 INJECTION, SOLUTION INTRAVENOUS; SUBCUTANEOUS at 11:29

## 2020-11-03 RX ADMIN — ANTI-FUNGAL POWDER MICONAZOLE NITRATE TALC FREE: 1.42 POWDER TOPICAL at 20:01

## 2020-11-03 RX ADMIN — POTASSIUM CHLORIDE 10 MEQ: 10 INJECTION, SOLUTION INTRAVENOUS at 11:29

## 2020-11-03 RX ADMIN — SODIUM CHLORIDE, PRESERVATIVE FREE 10 ML: 5 INJECTION INTRAVENOUS at 09:50

## 2020-11-03 RX ADMIN — REMDESIVIR 100 MG: 100 INJECTION, POWDER, LYOPHILIZED, FOR SOLUTION INTRAVENOUS at 17:57

## 2020-11-03 RX ADMIN — POTASSIUM CHLORIDE 10 MEQ: 10 INJECTION, SOLUTION INTRAVENOUS at 09:49

## 2020-11-03 RX ADMIN — LATANOPROST 1 DROP: 50 SOLUTION OPHTHALMIC at 20:11

## 2020-11-03 RX ADMIN — IPRATROPIUM BROMIDE AND ALBUTEROL 1 PUFF: 20; 100 SPRAY, METERED RESPIRATORY (INHALATION) at 03:37

## 2020-11-03 RX ADMIN — HEPARIN SODIUM 5000 UNITS: 10000 INJECTION INTRAVENOUS; SUBCUTANEOUS at 06:00

## 2020-11-03 RX ADMIN — HEPARIN SODIUM 5000 UNITS: 10000 INJECTION INTRAVENOUS; SUBCUTANEOUS at 22:30

## 2020-11-03 RX ADMIN — SODIUM CHLORIDE 3 G: 900 INJECTION INTRAVENOUS at 05:56

## 2020-11-03 RX ADMIN — IPRATROPIUM BROMIDE AND ALBUTEROL 1 PUFF: 20; 100 SPRAY, METERED RESPIRATORY (INHALATION) at 20:10

## 2020-11-03 RX ADMIN — INSULIN LISPRO 2 UNITS: 100 INJECTION, SOLUTION INTRAVENOUS; SUBCUTANEOUS at 22:25

## 2020-11-03 RX ADMIN — IPRATROPIUM BROMIDE AND ALBUTEROL 1 PUFF: 20; 100 SPRAY, METERED RESPIRATORY (INHALATION) at 17:00

## 2020-11-03 RX ADMIN — IPRATROPIUM BROMIDE AND ALBUTEROL 1 PUFF: 20; 100 SPRAY, METERED RESPIRATORY (INHALATION) at 09:51

## 2020-11-03 ASSESSMENT — PAIN SCALES - PAIN ASSESSMENT IN ADVANCED DEMENTIA (PAINAD)
FACIALEXPRESSION: 0
NEGVOCALIZATION: 0
TOTALSCORE: 0
BREATHING: 0
NEGVOCALIZATION: 0
BODYLANGUAGE: 0
TOTALSCORE: 0
CONSOLABILITY: 0
BREATHING: 0
CONSOLABILITY: 0
FACIALEXPRESSION: 0
BODYLANGUAGE: 0

## 2020-11-03 NOTE — PROGRESS NOTES
[phone]     Recent Labs     11/01/20  1812 11/02/20  0335 11/03/20  0330   * 151* 154*   K 3.9 3.6 3.5   * 114* 118*   CO2 22 18* 20*   BUN 89* 79* 65*   CREATININE 1.6* 1.1* 0.8   CALCIUM 8.4* 8.6 8.8     Recent Labs     11/01/20  0601 11/02/20  0335 11/03/20  0330   AST 30 24 19   ALT 8 9 8   BILITOT 0.4 0.3 0.4   ALKPHOS 63 57 55     Recent Labs     11/02/20  0335 11/03/20  0330   INR 1.1 1.2     Recent Labs     11/01/20  0601 11/01/20  1615   TROPONINI 0.09* <0.01     Recent Labs     11/01/20  0601 11/02/20  0335 11/03/20  0330   AST 30 24 19   ALT 8 9 8   BILITOT 0.4 0.3 0.4   ALKPHOS 63 57 55     Recent Labs     11/01/20  0440   LACTA 2.1     Lab Results   Component Value Date    URICACID 3.8 12/11/2011     No results found for: AMMONIA    Assessment:    Active Hospital Problems    Diagnosis Date Noted    COVID-19 [U07.1] 11/01/2020   aspiration of pneumonia  Hypernatremia  Hypokalemia  Glaucoma   hyperglycemia  Plan:  *dc nacl   Cont unasyn   cont decadron   SSI  Remdesivir(11/2-11/5)      DVT Prophylaxis:  heparin  Diet: Diet NPO Effective Now Exceptions are: Sips with Meds  Code Status: Limited    PT/OT Eval Status: **ordered    Dispo - SNF      Electronically signed by Vanda Yousif DO on 11/3/2020 at 3:32 PM [employer]

## 2020-11-03 NOTE — PROGRESS NOTES
Physical Therapy  Physical Therapy Initial Assessment     Name: Jamilah Flowers  : 1925  MRN: [id number]    Referring Provider: Kd Castellon DO    Date of Service: 11/3/2020    Evaluating PT: Navi Draper PT, DPT, [id number]    Room #: 3470/4290-L  Diagnosis: [id number]  PMHx/PSHx: RA, cellulitis, HTN, anemia, glaucoma, OA  Precautions: Fall risk, Droplet Plus Isolation (COVID-19), 15 L O2/min via NRB, continuous pulse ox, TAPS, miller, alarm    SUBJECTIVE:    Pt is poor historian and unable to provide social hx/PLOF. Per chart, pt is from an ECF. OBJECTIVE:   Initial Evaluation  Date: 11/3/20 Treatment Date: Short Term/ Long Term   Goals   AM-PAC 6 Clicks      Was pt agreeable to Eval/treatment? Yes     Does pt have pain? No current complaints/indications of pain     Bed Mobility  Rolling: Max A  Supine to sit: Dependent  Sit to supine: Dependent  Scooting: Dependent to HOB  Rolling: Mod A  Supine to sit: Mod A  Sit to supine: Mod A  Scooting: Mod A   Transfers Sit to stand: NT  Stand to sit: NT  Stand pivot: NT  Sit to stand: Mod A  Stand to sit: Mod A  Stand pivot: Mod A with AAD   Ambulation   NT  Sidestep 3 feet with AAD with Mod A   Stair negotiation: ascended and descended NT  NA   ROM BUE: Refer to OT note  BLE: WFL     Strength BUE: Refer to OT note  BLE: NT     Balance Sitting EOB: Max A  Dynamic Standing: NT  Sitting EOB: Min A  Dynamic Standing: Mod A with AAD     Pt is A & O x: 1 to person. Sensation: Unable to accurately assess due to cognitive status. Edema: Unremarkable. Patient education  Pt educated on PT role in acute care setting.     Patient response to education:   Pt verbalized understanding Pt demonstrated skill Pt requires further education in this area   No NA Yes     ASSESSMENT:    Vitals on 15 L O2/min via NRB:  Rest: O2 sat 93%,  bpm, RR 35  Sitting EOB: O2 sat 86%,  bpm, RR 38  After sit to supine transfer/repositioning: Unable to obtain accurate O2 sat

## 2020-11-03 NOTE — PLAN OF CARE
Problem: Skin Integrity:  Goal: Will show no infection signs and symptoms  Description: Will show no infection signs and symptoms  Outcome: Met This Shift     Problem: Falls - Risk of:  Goal: Will remain free from falls  Description: Will remain free from falls  Outcome: Met This Shift  Goal: Absence of physical injury  Description: Absence of physical injury  Outcome: Met This Shift     Problem: Confusion - Acute:  Goal: Absence of continued neurological deterioration signs and symptoms  Description: Absence of continued neurological deterioration signs and symptoms  Outcome: Met This Shift  Goal: Mental status will be restored to baseline  Description: Mental status will be restored to baseline  Outcome: Met This Shift     Problem: Discharge Planning:  Goal: Ability to perform activities of daily living will improve  Description: Ability to perform activities of daily living will improve  Outcome: Met This Shift  Goal: Participates in care planning  Description: Participates in care planning  Outcome: Met This Shift     Problem: Injury - Risk of, Physical Injury:  Goal: Will remain free from falls  Description: Will remain free from falls  Outcome: Met This Shift  Goal: Absence of physical injury  Description: Absence of physical injury  Outcome: Met This Shift     Problem: Mood - Altered:  Goal: Mood stable  Description: Mood stable  Outcome: Met This Shift  Goal: Absence of abusive behavior  Description: Absence of abusive behavior  Outcome: Met This Shift  Goal: Verbalizations of feeling emotionally comfortable while being cared for will increase  Description: Verbalizations of feeling emotionally comfortable while being cared for will increase  Outcome: Met This Shift     Problem: Psychomotor Activity - Altered:  Goal: Absence of psychomotor disturbance signs and symptoms  Description: Absence of psychomotor disturbance signs and symptoms  Outcome: Met This Shift     Problem: Sensory Perception - Impaired:  Goal: Demonstrations of improved sensory functioning will increase  Description: Demonstrations of improved sensory functioning will increase  Outcome: Met This Shift  Goal: Decrease in sensory misperception frequency  Description: Decrease in sensory misperception frequency  Outcome: Met This Shift  Goal: Able to refrain from responding to false sensory perceptions  Description: Able to refrain from responding to false sensory perceptions  Outcome: Met This Shift  Goal: Demonstrates accurate environmental perceptions  Description: Demonstrates accurate environmental perceptions  Outcome: Met This Shift  Goal: Able to distinguish between reality-based and nonreality-based thinking  Description: Able to distinguish between reality-based and nonreality-based thinking  Outcome: Met This Shift  Goal: Able to interrupt nonreality-based thinking  Description: Able to interrupt nonreality-based thinking  Outcome: Met This Shift     Problem: Sleep Pattern Disturbance:  Goal: Appears well-rested  Description: Appears well-rested  Outcome: Met This Shift     Problem: Pain:  Goal: Pain level will decrease  Description: Pain level will decrease  Outcome: Met This Shift  Goal: Control of acute pain  Description: Control of acute pain  Outcome: Met This Shift  Goal: Control of chronic pain  Description: Control of chronic pain  Outcome: Met This Shift     Problem: Airway Clearance - Ineffective  Goal: Achieve or maintain patent airway  Outcome: Met This Shift     Problem:  Body Temperature -  Risk of, Imbalanced  Goal: Ability to maintain a body temperature within defined limits  Outcome: Met This Shift  Goal: Will regain or maintain usual level of consciousness  Outcome: Met This Shift  Goal: Complications related to the disease process, condition or treatment will be avoided or minimized  Outcome: Met This Shift     Problem: Isolation Precautions - Risk of Spread of Infection  Goal: Prevent transmission of infection  Outcome: Met This Shift     Problem: Nutrition Deficits  Goal: Optimize nutrtional status  Outcome: Met This Shift     Problem: Risk for Fluid Volume Deficit  Goal: Maintain normal heart rhythm  Outcome: Met This Shift  Goal: Maintain absence of muscle cramping  Outcome: Met This Shift  Goal: Maintain normal serum potassium, sodium, calcium, phosphorus, and pH  Outcome: Met This Shift     Problem: Loneliness or Risk for Loneliness  Goal: Demonstrate positive use of time alone when socialization is not possible  Outcome: Met This Shift     Problem: Fatigue  Goal: Verbalize increase energy and improved vitality  Outcome: Met This Shift     Problem: Patient Education: Go to Patient Education Activity  Goal: Patient/Family Education  Outcome: Met This Shift

## 2020-11-03 NOTE — PROGRESS NOTES
SPEECH/LANGUAGE PATHOLOGY  BEDSIDE SWALLOWING EVALUATION    PATIENT NAME:  Teresa Diaz      :  1925          TODAY'S DATE:  11/3/2020 ROOM:  [address] [place] [address]      SUMMARY OF EVALUATION     DYSPHAGIA DIAGNOSIS:  Marked oropharyngeal dysphagia      DIET RECOMMENDATIONS:  NPO (nothing by mouth including oral meds)       FEEDING RECOMMENDATIONS:     Assistance level:  Not applicable      Compensatory strategies recommended: Not applicable    THERAPY RECOMMENDATIONS:      Dysphagia therapy is not recommended                   PROCEDURE     Consistencies Administered During the Evaluation   Liquids: thin liquid   Solids:  pureed foods      Method of Intake:   spoon  Fed by clinician      Position:   Seated, upright                  RESULTS     Oral Stage:         Delayed A-P transit due to: cognitive function  and Oral residuals were noted :  throughout the oral cavity      Pharyngeal Stage:      Latent wet cough was noted after presentation of thin liquid and pureed foods, Wet respirations were noted after presentation of thin liquid and pureed foods, Wet/gurgly vocal quality was noted after presentation of thin liquid and pureed foods, Multiple swallows were noted after presentation of thin liquid and pureed foods and Delayed initiation of the pharyngeal swallow noted                  CPT code:  94241  bedside swallow eval      [x]The admitting diagnosis and active problem list, as listed below have been reviewed prior to initiation of this evaluation.      ADMITTING DIAGNOSIS: COVID-19 [U07.1]  COVID-19 [U07.1]     ACTIVE PROBLEM LIST:   Patient Active Problem List   Diagnosis    DVT (deep venous thrombosis) (HCC)    Normocytic anemia    Rheumatoid arthritis (HCC)    Cervical spondylosis    Lumbar spondylosis    Shoulder joint dysfunction    DDD (degenerative disc disease)    Spinal stenosis    Vertebral compression fracture     Bulging lumbar disc    Cervical spinal canal tumor    Dementia associated with other underlying disease with behavioral disturbance ([facility]    COVID-19

## 2020-11-03 NOTE — PROGRESS NOTES
Physician Progress Note      Eddie Pruett  CSN #:                  [id number]  :                       1925  ADMIT DATE:       2020 4:19 AM  DISCH DATE:  RESPONDING  PROVIDER #:        Sara MONTIEL DO          QUERY TEXT:    Pt admitted with COVID-19 and noted to have KAREN, acute respiratory failure,   WBC 12.9, CRP 29.2, procalcitonin 1.11, lactic acid 2.1, and sed rate 101. If   possible, please document in progress notes and discharge summary if you are   evaluating and/or treating: The medical record reflects the following:  Risk Factors: COVID-19  Clinical Indicators: Patient with documented COVID-19, KAREN, and acute   respiratory failure. Labs and vitals resulted:WBC 12.9, CRP 29.2,   procalcitonin 1.11, lactic acid 2.1, and sed rate 101. Treatment: diagnostics, labs, vitals, 15 L non rebreather    Thank you,  Alexa Javier RN, BSN, Clinical Documentation Improvement  Options provided:  -- Sepsis due to COVID-19, POA  -- Sepsis due to COVID-19, not POA  -- COVID-19 without sepsis  -- Other - I will add my own diagnosis  -- Disagree - Not applicable / Not valid  -- Disagree - Clinically unable to determine / Unknown  -- Refer to Clinical Documentation Reviewer    PROVIDER RESPONSE TEXT:    This patient has COVID-19 without sepsis.     Query created by: Fadia Goodrich on 11/3/2020 12:23 PM      Electronically signed by:  Sari Wick DO 11/3/2020 2:08 PM

## 2020-11-03 NOTE — CONSULTS
[place]  Department of Internal Medicine  Division of Pulmonary, Critical Care and Sleep Medicine  Consult Note    Helen Campa DO, Claudetta Mulligan, Zach Goldsmith MD, [facility]    Patient: Corinne Penning  MRN: [id number]  : 1925    Encounter Time: 5:58 PM     Date of Admission: 2020  4:19 AM    Primary Care Physician: Brendan Ortiz MD    Reason for Consultation: CoVID 19     HISTORY OF PRESENT ILLNESS : Modest Towndanie Dixonemi 80 y.o. female was seen in consultation regarding the above chief compliant. History is obtained from EMR R and ER physician due to patient currently nonverbal.  Mandi Ortiz presented to the ER from [facility] for evaluation of hypoxia. She was found to be positive for coronavirus at their facility. Upon ER arrival she was found to be 86%. In the ER, she was found to be in renal failure and hyponatremic. Chest x-ray showing bilateral infiltrates. I spoke with her daughter, Camila Barahona, who is the POA and discussed CODE STATUS. Camila Barahona wants her to be a limited code with no intubation, no CPR, no emergency resuscitative meds, no defibrillation. She wishes for her to still get fluids for her renal failure and treatment for coronavirus.     ER Course  Upon presentation to the ER, routine labwork was performed which revealed sodium of 152, BUN of 109, creatinine 3.0, troponin 0 0.09, WBC 12.9.. Imaging results are as outlined below in the Imaging section of this note. EKG revealed NSR with possible left atrial enlargement and nonspecific ST abnormality. Upon arrival to the ER, patient was 99/61. The patient received Decadron in the emergency room and was admitted under the care of [facility] physicians       PAST MEDICAL HISTORY:  has a past medical history of Anemia, Cellulitis, GERD (gastroesophageal reflux disease), Glaucoma, Hypertension, Hypokalemia, Insomnia, Osteoarthritis, and Rheumatoid arthritis(714.0).     SURGICAL HISTORY:  has a past surgical history that includes Eye surgery; joint replacement (Left); and other surgical history (Right, 8/7/2015). SOCIAL HISTORY:  reports that she has quit smoking. She has never used smokeless tobacco. She reports that she does not drink alcohol or use drugs. FAMILY  HISTORY: family history includes Heart Failure in her mother. MEDICATIONS:    Prior to Admission medications    Medication Sig Start Date End Date Taking?  Authorizing Provider   coenzyme Q10 100 MG CAPS capsule Take 100 mg by mouth daily   Yes Historical Provider, MD   LUTEIN PO Take 1 capsule by mouth daily   Yes Historical Provider, MD   memantine ER (NAMENDA XR) 14 MG CP24 extended release capsule Take 14 mg by mouth daily   Yes Historical Provider, MD   pantoprazole (PROTONIX) 40 MG tablet Take 40 mg by mouth daily   Yes Historical Provider, MD   RESVERATROL PO Take 5 mg by mouth   Yes Historical Provider, MD   Turmeric 500 MG CAPS Take 1 capsule by mouth daily   Yes Historical Provider, MD   Calcium Carbonate-Vit D-Min (RA CALCIUM PLUS MINERALS/VIT D PO) Take 2 capsules by mouth 2 times daily   Yes Historical Provider, MD   divalproex (DEPAKOTE) 125 MG DR tablet Take 125 mg by mouth 2 times daily Am and at hs   Yes Historical Provider, MD   donepezil (ARICEPT ODT) 5 MG disintegrating tablet Take 5 mg by mouth 2 times daily   Yes Historical Provider, MD   hydrOXYzine (VISTARIL) 25 MG capsule Take 25 mg by mouth 2 times daily Am and afternoon for agitation   Yes Historical Provider, MD   Cranberry 250 MG TABS Take 1 capsule by mouth 3 times daily No dose specified   Yes Historical Provider, MD   NONFORMULARY Take 2 tablets by mouth 2 times daily collegen advance   Yes Historical Provider, MD   latanoprost (XALATAN) 0.005 % ophthalmic solution Place 1 drop into both eyes nightly   Yes Historical Provider, MD   melatonin 5 MG TABS tablet Take 5 mg by mouth nightly   Yes Historical Provider, MD   diclofenac sodium (VOLTAREN) 1 % GEL Apply 2 g topically 2 times daily as needed for Pain Apply topically to the affected areas on knees lower back and shoulders   Yes Historical Provider, MD   polyethylene glycol (GLYCOLAX) 17 g packet Take 17 g by mouth every 48 hours as needed for Constipation   Yes Historical Provider, MD   aspirin-acetaminophen-caffeine (Finas Pelt) 250-250-65 MG per tablet Take 12 tablets by mouth every 6 hours as needed for Headaches   Yes Historical Provider, MD   QUEtiapine (SEROQUEL) 25 MG tablet Take 25 mg by mouth 2 times daily Indications: 8am and 5pm   Yes Historical Provider, MD   metoprolol (TOPROL-XL) 50 MG XL tablet Take 50 mg by mouth 2 times daily   Yes Historical Provider, MD   amLODIPine (NORVASC) 2.5 MG tablet Take 2.5 mg by mouth daily   Yes Historical Provider, MD   traMADol (ULTRAM) 50 MG tablet Take 50 mg by mouth 2 times daily. Yes Historical Provider, MD   lactase (LACTAID) 3000 UNITS tablet Take 1 tablet by mouth 3 times daily as needed   Yes Historical Provider, MD   acetaminophen (TYLENOL) 500 MG tablet Take 1 tablet by mouth every 4 hours as needed 8/10/15  Yes Darion Arzate, DO   Omega 3 1000 MG CAPS Take 1,000 mg by mouth    Historical Provider, MD       ALLERGIES: Food       REVIEW OF SYSTEMS:  Otherwise negative patient with dementia so unreliable      OBJECTIVE:     PHYSICAL EXAM:   VITALS:     Intake/Output Summary (Last 24 hours) at 11/3/2020 1758  Last data filed at 11/3/2020 0630  Gross per 24 hour   Intake 814.88 ml   Output 700 ml   Net 114.88 ml        CONSTITUTIONAL:    Alert   SKIN:     Mild skin discoloration  HEENT:     No thrush  NECK:    No bruits, No JVP apprechiated  CV:      AS  murmur, No rubs, No gallops  PULMONARY:   Couse BS,  No Wheezing, No Rales, No Rhonchi      No noted egophony  ABDOMEN:     Soft, non-tender. BS normal. No R/R/G  EXT:    No deformities . No clubbing.        trace lower extremity edema, No venous stasis  PULSE:   Appears equal and palpable. PSYCHIATRIC:  No acute psycosis  MS:    Gross weakness  NEUROLOGIC:   The clinical assessment is non-focal     DATA: IMAGING & TESTING:     LABORATORY TESTS:    CBC:   Lab Results   Component Value Date    WBC 11.3 11/03/2020    RBC 4.94 11/03/2020    HGB 13.2 11/03/2020    HCT 40.0 11/03/2020    MCV 81.0 11/03/2020    MCH 26.7 11/03/2020    MCHC 33.0 11/03/2020    RDW 15.0 11/03/2020     11/03/2020    MPV 11.0 11/03/2020     CMP:    Lab Results   Component Value Date     11/03/2020    K 3.5 11/03/2020     11/03/2020    CO2 20 11/03/2020    BUN 65 11/03/2020    CREATININE 0.8 11/03/2020    GFRAA >60 11/03/2020    LABGLOM >60 11/03/2020    GLUCOSE 147 11/03/2020    GLUCOSE 87 04/10/2012    PROT 6.4 11/03/2020    LABALBU 3.0 11/03/2020    LABALBU 4.1 04/10/2012    CALCIUM 8.8 11/03/2020    BILITOT 0.4 11/03/2020    ALKPHOS 55 11/03/2020    AST 19 11/03/2020    ALT 8 11/03/2020     PT/INR:    Lab Results   Component Value Date    PROTIME 13.3 11/03/2020    INR 1.2 11/03/2020     ABG:  No results found for: PH, PCO2, PO2, HCO3, BE, THGB, TCO2, O2SAT  TSH:    Lab Results   Component Value Date    TSH 1.230 08/08/2015     FERRITIN:    Lab Results   Component Value Date    FERRITIN 999 11/01/2020     Fibrinogen Level:  No components found for: FIB     PRO-BNP: No results found for: PROBNP   ABGs: No results found for: PH, PO2, PCO2  Hemoglobin A1C: No components found for: HGBA1C    IMAGING:  Imaging tests were completed and reviewed and discussed radiology and care team involved and reveals   Xr Chest Portable    Result Date: 11/2/2020  EXAMINATION: ONE XRAY VIEW OF THE CHEST 11/2/2020 9:29 am COMPARISON: November 1, 2020;  October 21, 2015 HISTORY: ORDERING SYSTEM PROVIDED HISTORY: compare to previous, sob TECHNOLOGIST PROVIDED HISTORY: Reason for exam:->compare to previous, sob What reading provider will be dictating this exam?->CRC FINDINGS: Heart size is unable to be accurately assessed on this single portable view of the chest, but appears to be stable. There are new hazy and patchy airspace opacities throughout the right lung, predominantly affecting the mid and upper lung zones. Difficult to exclude small right pleural effusion. The left lung is clear. Bones are diffusely osteopenic. 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 This is unchanged dating back to 2015. New airspace opacities throughout the right lung suggestive of aspiration/pneumonia. Asymmetric pulmonary edema could have a similar appearance. Xr Chest Portable    Result Date: 11/1/2020  EXAMINATION: ONE XRAY VIEW OF THE CHEST 11/1/2020 5:07 am COMPARISON: 10/21/2015 HISTORY: ORDERING SYSTEM PROVIDED HISTORY: chest pain TECHNOLOGIST PROVIDED HISTORY: Reason for exam:->chest pain What reading provider will be dictating this exam?->CRC FINDINGS: There are bilateral infiltrates with predominantly peripheral distribution. There is no pleural effusion or pneumothorax seen. There is no cardiomegaly or pulmonary vascular congestion. There are old right-sided rib fractures. 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 Bilateral infiltrates. Assessment:   1. COVID 19 pneumonia   2. Respiratory failure ( oxygen saturation 93 % on 8 L of high flow oxygen )   3. Leukocytosis, elevated procalcitonin, hyperferritinemia   4. Hyperglycemia  5. Dementia  6. Lumbar and cervical disc disease            Plan:   1. ID following  2. Continue decadron  3. Oxygen is lowering   4. NPO aspiration assessment noted  5.  On Remdesivir to be finished 11/5         Luis E Harding DO, MPH, [facility]  Professor of Medicine  Pulmonary, Critical Care and Sleep Medicine

## 2020-11-04 PROBLEM — R73.03 PREDIABETES: Chronic | Status: ACTIVE | Noted: 2020-11-04

## 2020-11-04 LAB
ALBUMIN SERPL-MCNC: 2.6 G/DL (ref 3.5–5.2)
ALP BLD-CCNC: 51 U/L (ref 35–104)
ALT SERPL-CCNC: 8 U/L (ref 0–32)
ANION GAP SERPL CALCULATED.3IONS-SCNC: 11 MMOL/L (ref 7–16)
ANISOCYTOSIS: ABNORMAL
APTT: 31.9 SEC (ref 24.5–35.1)
AST SERPL-CCNC: 25 U/L (ref 0–31)
BASOPHILS ABSOLUTE: 0 E9/L (ref 0–0.2)
BASOPHILS RELATIVE PERCENT: 0.1 % (ref 0–2)
BILIRUB SERPL-MCNC: 0.5 MG/DL (ref 0–1.2)
BUN BLDV-MCNC: 44 MG/DL (ref 8–23)
C DIFF TOXIN/ANTIGEN: NORMAL
CALCIUM SERPL-MCNC: 8.6 MG/DL (ref 8.6–10.2)
CHLORIDE BLD-SCNC: 116 MMOL/L (ref 98–107)
CO2: 22 MMOL/L (ref 22–29)
CREAT SERPL-MCNC: 0.5 MG/DL (ref 0.5–1)
EOSINOPHILS ABSOLUTE: 0 E9/L (ref 0.05–0.5)
EOSINOPHILS RELATIVE PERCENT: 0 % (ref 0–6)
FIBRINOGEN: 501 MG/DL (ref 225–540)
GFR AFRICAN AMERICAN: >60
GFR NON-AFRICAN AMERICAN: >60 ML/MIN/1.73
GLUCOSE BLD-MCNC: 206 MG/DL (ref 74–99)
HCT VFR BLD CALC: 39.4 % (ref 34–48)
HEMOGLOBIN: 13 G/DL (ref 11.5–15.5)
INR BLD: 1.4
LYMPHOCYTES ABSOLUTE: 1.11 E9/L (ref 1.5–4)
LYMPHOCYTES RELATIVE PERCENT: 8.7 % (ref 20–42)
MCH RBC QN AUTO: 26.7 PG (ref 26–35)
MCHC RBC AUTO-ENTMCNC: 33 % (ref 32–34.5)
MCV RBC AUTO: 80.9 FL (ref 80–99.9)
METER GLUCOSE: 154 MG/DL (ref 74–99)
METER GLUCOSE: 174 MG/DL (ref 74–99)
METER GLUCOSE: 197 MG/DL (ref 74–99)
MONOCYTES ABSOLUTE: 0.98 E9/L (ref 0.1–0.95)
MONOCYTES RELATIVE PERCENT: 7.8 % (ref 2–12)
NEUTROPHILS ABSOLUTE: 10.33 E9/L (ref 1.8–7.3)
NEUTROPHILS RELATIVE PERCENT: 83.5 % (ref 43–80)
OVALOCYTES: ABNORMAL
PDW BLD-RTO: 15 FL (ref 11.5–15)
PLATELET # BLD: 355 E9/L (ref 130–450)
PMV BLD AUTO: 11.1 FL (ref 7–12)
POIKILOCYTES: ABNORMAL
POLYCHROMASIA: ABNORMAL
POTASSIUM SERPL-SCNC: 3.5 MMOL/L (ref 3.5–5)
PROTHROMBIN TIME: 15.4 SEC (ref 9.3–12.4)
RBC # BLD: 4.87 E12/L (ref 3.5–5.5)
SODIUM BLD-SCNC: 149 MMOL/L (ref 132–146)
TOTAL PROTEIN: 6.4 G/DL (ref 6.4–8.3)
WBC # BLD: 12.3 E9/L (ref 4.5–11.5)

## 2020-11-04 PROCEDURE — 87449 NOS EACH ORGANISM AG IA: CPT

## 2020-11-04 PROCEDURE — 2700000000 HC OXYGEN THERAPY PER DAY

## 2020-11-04 PROCEDURE — 80053 COMPREHEN METABOLIC PANEL: CPT

## 2020-11-04 PROCEDURE — 85730 THROMBOPLASTIN TIME PARTIAL: CPT

## 2020-11-04 PROCEDURE — 87324 CLOSTRIDIUM AG IA: CPT

## 2020-11-04 PROCEDURE — 6360000002 HC RX W HCPCS: Performed by: INTERNAL MEDICINE

## 2020-11-04 PROCEDURE — 6370000000 HC RX 637 (ALT 250 FOR IP): Performed by: INTERNAL MEDICINE

## 2020-11-04 PROCEDURE — 85384 FIBRINOGEN ACTIVITY: CPT

## 2020-11-04 PROCEDURE — 85610 PROTHROMBIN TIME: CPT

## 2020-11-04 PROCEDURE — 2580000003 HC RX 258: Performed by: INTERNAL MEDICINE

## 2020-11-04 PROCEDURE — 2140000000 HC CCU INTERMEDIATE R&B

## 2020-11-04 PROCEDURE — 82962 GLUCOSE BLOOD TEST: CPT

## 2020-11-04 PROCEDURE — 99232 SBSQ HOSP IP/OBS MODERATE 35: CPT | Performed by: INTERNAL MEDICINE

## 2020-11-04 PROCEDURE — 36415 COLL VENOUS BLD VENIPUNCTURE: CPT

## 2020-11-04 PROCEDURE — 2500000003 HC RX 250 WO HCPCS: Performed by: INTERNAL MEDICINE

## 2020-11-04 PROCEDURE — 85025 COMPLETE CBC W/AUTO DIFF WBC: CPT

## 2020-11-04 PROCEDURE — 2580000003 HC RX 258

## 2020-11-04 RX ORDER — SODIUM CHLORIDE 0.9 % (FLUSH) 0.9 %
SYRINGE (ML) INJECTION
Status: COMPLETED
Start: 2020-11-04 | End: 2020-11-04

## 2020-11-04 RX ORDER — POTASSIUM CHLORIDE 7.45 MG/ML
10 INJECTION INTRAVENOUS
Status: COMPLETED | OUTPATIENT
Start: 2020-11-04 | End: 2020-11-04

## 2020-11-04 RX ORDER — POTASSIUM CHLORIDE 20 MEQ/1
40 TABLET, EXTENDED RELEASE ORAL 2 TIMES DAILY WITH MEALS
Status: DISCONTINUED | OUTPATIENT
Start: 2020-11-04 | End: 2020-11-04

## 2020-11-04 RX ADMIN — DEXAMETHASONE SODIUM PHOSPHATE 6 MG: 4 INJECTION, SOLUTION INTRAMUSCULAR; INTRAVENOUS at 09:14

## 2020-11-04 RX ADMIN — DEXTROSE MONOHYDRATE: 50 INJECTION, SOLUTION INTRAVENOUS at 16:41

## 2020-11-04 RX ADMIN — INSULIN LISPRO 4 UNITS: 100 INJECTION, SOLUTION INTRAVENOUS; SUBCUTANEOUS at 06:30

## 2020-11-04 RX ADMIN — IPRATROPIUM BROMIDE AND ALBUTEROL 1 PUFF: 20; 100 SPRAY, METERED RESPIRATORY (INHALATION) at 04:16

## 2020-11-04 RX ADMIN — IPRATROPIUM BROMIDE AND ALBUTEROL 1 PUFF: 20; 100 SPRAY, METERED RESPIRATORY (INHALATION) at 00:41

## 2020-11-04 RX ADMIN — IPRATROPIUM BROMIDE AND ALBUTEROL 1 PUFF: 20; 100 SPRAY, METERED RESPIRATORY (INHALATION) at 21:04

## 2020-11-04 RX ADMIN — HEPARIN SODIUM 5000 UNITS: 10000 INJECTION INTRAVENOUS; SUBCUTANEOUS at 22:10

## 2020-11-04 RX ADMIN — REMDESIVIR 100 MG: 100 INJECTION, POWDER, LYOPHILIZED, FOR SOLUTION INTRAVENOUS at 18:03

## 2020-11-04 RX ADMIN — IPRATROPIUM BROMIDE AND ALBUTEROL 1 PUFF: 20; 100 SPRAY, METERED RESPIRATORY (INHALATION) at 16:41

## 2020-11-04 RX ADMIN — SODIUM CHLORIDE 3 G: 900 INJECTION INTRAVENOUS at 16:41

## 2020-11-04 RX ADMIN — IPRATROPIUM BROMIDE AND ALBUTEROL 1 PUFF: 20; 100 SPRAY, METERED RESPIRATORY (INHALATION) at 11:56

## 2020-11-04 RX ADMIN — POTASSIUM CHLORIDE 10 MEQ: 10 INJECTION, SOLUTION INTRAVENOUS at 10:00

## 2020-11-04 RX ADMIN — DEXTROSE MONOHYDRATE: 50 INJECTION, SOLUTION INTRAVENOUS at 00:41

## 2020-11-04 RX ADMIN — LATANOPROST 1 DROP: 50 SOLUTION OPHTHALMIC at 21:04

## 2020-11-04 RX ADMIN — SODIUM CHLORIDE 3 G: 900 INJECTION INTRAVENOUS at 06:07

## 2020-11-04 RX ADMIN — ANTI-FUNGAL POWDER MICONAZOLE NITRATE TALC FREE: 1.42 POWDER TOPICAL at 21:04

## 2020-11-04 RX ADMIN — HEPARIN SODIUM 5000 UNITS: 10000 INJECTION INTRAVENOUS; SUBCUTANEOUS at 06:00

## 2020-11-04 RX ADMIN — INSULIN LISPRO 2 UNITS: 100 INJECTION, SOLUTION INTRAVENOUS; SUBCUTANEOUS at 20:15

## 2020-11-04 RX ADMIN — HEPARIN SODIUM 5000 UNITS: 10000 INJECTION INTRAVENOUS; SUBCUTANEOUS at 14:20

## 2020-11-04 RX ADMIN — SODIUM CHLORIDE, PRESERVATIVE FREE 10 ML: 5 INJECTION INTRAVENOUS at 06:09

## 2020-11-04 RX ADMIN — POTASSIUM CHLORIDE 10 MEQ: 10 INJECTION, SOLUTION INTRAVENOUS at 11:26

## 2020-11-04 RX ADMIN — SODIUM CHLORIDE 3 G: 900 INJECTION INTRAVENOUS at 21:04

## 2020-11-04 RX ADMIN — IPRATROPIUM BROMIDE AND ALBUTEROL 1 PUFF: 20; 100 SPRAY, METERED RESPIRATORY (INHALATION) at 09:14

## 2020-11-04 RX ADMIN — ANTI-FUNGAL POWDER MICONAZOLE NITRATE TALC FREE: 1.42 POWDER TOPICAL at 09:14

## 2020-11-04 ASSESSMENT — PAIN SCALES - PAIN ASSESSMENT IN ADVANCED DEMENTIA (PAINAD)
FACIALEXPRESSION: 0
BREATHING: 0
BREATHING: 0
NEGVOCALIZATION: 0
FACIALEXPRESSION: 0
TOTALSCORE: 0
TOTALSCORE: 0
NEGVOCALIZATION: 0
CONSOLABILITY: 0
CONSOLABILITY: 0
BODYLANGUAGE: 0
NEGVOCALIZATION: 0
TOTALSCORE: 0
BREATHING: 0
CONSOLABILITY: 0
FACIALEXPRESSION: 0

## 2020-11-04 NOTE — PROGRESS NOTES
Department of Internal Medicine  Infectious Diseases  Progress  Note      C/C :  COVID 19 pneumonia     Pt is more awake , non communicating  No respiratory distress    Afebrile       Current Facility-Administered Medications   Medication Dose Route Frequency Provider Last Rate Last Dose    ampicillin-sulbactam (UNASYN) 3 g ivpb minibag  3 g Intravenous Q6H Paige Huitron DO        insulin lispro (HUMALOG) injection vial 0-10 Units  0-10 Units Subcutaneous 4x Daily AC & HS Bhavik Geovani Limb, DO   4 Units at 11/04/20 0630    glucose (GLUTOSE) 40 % oral gel 15 g  15 g Oral PRN [facility] Limb, DO        dextrose 50 % IV solution  12.5 g Intravenous PRN Melida Torre DO        glucagon (rDNA) injection 1 mg  1 mg Intramuscular PRN [facility] Limb, DO        dextrose 5 % solution  100 mL/hr Intravenous PRN [facility] Limb, DO        dextrose 5 % solution   Intravenous Continuous [facility] Limb, DO 75 mL/hr at 11/04/20 0041      miconazole (MICOTIN) 2 % powder   Topical BID Paige Huitron DO        heparin (porcine) injection 5,000 Units  5,000 Units Subcutaneous 3 times per day Kwasi Aguirre DO   5,000 Units at 11/04/20 0600    acetaminophen (TYLENOL) tablet 650 mg  650 mg Oral Q6H PRN Kwasi Aguirre DO        Or    acetaminophen (TYLENOL) suppository 650 mg  650 mg Rectal Q6H PRN Paige Huitron DO        guaiFENesin-dextromethorphan (ROBITUSSIN DM) 100-10 MG/5ML syrup 5 mL  5 mL Oral Q4H PRN Kwasi Aguirre DO        remdesivir 100 mg in sodium chloride 0.9 % 250 mL IVPB  100 mg Intravenous Q24H Eric Evans MD   Stopped at 11/03/20 1814    0.9 % sodium chloride bolus  30 mL Intravenous PRN Eric Evans MD        dexamethasone (DECADRON) injection 6 mg  6 mg Intravenous Daily Paige Huitron DO   6 mg at 11/04/20 0914    latanoprost (XALATAN) 0.005 % ophthalmic solution 1 drop  1 drop Both Eyes Nightly Paige Huitron DO   1 drop at 11/03/20 2011    bisacodyl (DULCOLAX) suppository 10 mg  10 mg Rectal PRN Paige Huitron DO        albuterol-ipratropium (COMBIVENT RESPIMAT)  MCG/ACT inhaler 1 puff  1 puff Inhalation 6 times per day Brent Mendoza    1 puff at 11/04/20 1156         REVIEW OF SYSTEMS:  Could not be obtained       PHYSICAL EXAM:      Vitals:     Vitals:    11/04/20 0722   BP: 112/68   Pulse: 68   Resp: 23   Temp: 96.1 °F (35.6 °C)   SpO2: 90%       General Appearance:    Awake, non communicating . Head:    Normocephalic, atraumatic   Eyes:    No pallor, no icterus,   Ears:    No obvious deformity or drainage.    Nose:   No nasal drainage   Throat:  Dry oral mucosa    Neck:   Supple, no lymphadenopathy   Lungs:     Diminished breath sound    Heart:     Regular     Abdomen:     Soft, non-tender, bowel sounds present    Extremities:   No edema, no cyanosis   Pulses:   Dorsalis pedis palpable    Skin:   no rashes or lesions        Lab Results   Component Value Date    WBC 12.3 11/04/2020    RBC 4.87 11/04/2020    HGB 13.0 11/04/2020    HCT 39.4 11/04/2020     11/04/2020    MCV 80.9 11/04/2020    MCH 26.7 11/04/2020    MCHC 33.0 11/04/2020    RDW 15.0 11/04/2020    SEGSPCT 58 05/06/2013    METASPCT 0.9 11/03/2020    LYMPHOPCT 8.7 11/04/2020    MONOPCT 7.8 11/04/2020    MYELOPCT 0.9 11/03/2020    BASOPCT 0.1 11/04/2020    MONOSABS 0.98 11/04/2020    LYMPHSABS 1.11 11/04/2020    EOSABS 0.00 11/04/2020    BASOSABS 0.00 11/04/2020       CMP     Lab Results   Component Value Date     11/04/2020    K 3.5 11/04/2020    K 3.5 11/03/2020     11/04/2020    CO2 22 11/04/2020    BUN 44 11/04/2020    CREATININE 0.5 11/04/2020    GFRAA >60 11/04/2020    LABGLOM >60 11/04/2020    GLUCOSE 206 11/04/2020    GLUCOSE 87 04/10/2012    PROT 6.4 11/04/2020    LABALBU 2.6 11/04/2020    LABALBU 4.1 04/10/2012    CALCIUM 8.6 11/04/2020    BILITOT 0.5 11/04/2020    ALKPHOS 51 11/04/2020    AST 25 11/04/2020    ALT 8 11/04/2020         Hepatic Function Panel:    Lab Results   Component Value Date    ALKPHOS 51 11/04/2020    ALT 8 11/04/2020    AST 25 11/04/2020    PROT 6.4 11/04/2020    BILITOT 0.5 11/04/2020    LABALBU 2.6 11/04/2020    LABALBU 4.1 04/10/2012       U/A:    Lab Results   Component Value Date    COLORU Yellow 10/22/2015    PHUR 5.5 10/22/2015    WBCUA 0-1 10/04/2015    WBCUA NONE 11/10/2010    RBCUA NONE 10/04/2015    RBCUA NONE 11/10/2010    BACTERIA MANY 10/04/2015    CLARITYU Clear 10/22/2015    SPECGRAV >=1.030 10/22/2015    LEUKOCYTESUR Negative 10/22/2015    UROBILINOGEN 0.2 10/22/2015    BILIRUBINUR Negative 10/22/2015    BILIRUBINUR NEGATIVE 11/10/2010    BLOODU Negative 10/22/2015    GLUCOSEU Negative 10/22/2015    GLUCOSEU NEGATIVE 11/10/2010       MICROBIOLOGY:    COVID +ve at Nursing home     Radiology :    Chest X ray : Bilateral infiltrates       IMPRESSION:     1. COVID 19 pneumonia   2. Respiratory failure ( oxygen saturation 90 % on 8 L of high flow oxygen )     RECOMMENDATIONS:      1. Decadron 6 mg po q 24 hrs   2. Remdesivir  100 mg IV q 24 hrs  3. Follow LFTs   4. Oxygen supplement

## 2020-11-04 NOTE — PLAN OF CARE
Problem: Skin Integrity:  Goal: Will show no infection signs and symptoms  Description: Will show no infection signs and symptoms  11/4/2020 0126 by Janene Hawkins RN  Outcome: Met This Shift  11/3/2020 1812 by Monica Glasgow RN  Outcome: Met This Shift  Goal: Absence of new skin breakdown  Description: Absence of new skin breakdown  11/4/2020 0126 by Janene Hawkins RN  Outcome: Met This Shift  11/3/2020 1812 by Monica Glasgow RN  Outcome: Met This Shift     Problem: Falls - Risk of:  Goal: Will remain free from falls  Description: Will remain free from falls  11/4/2020 0126 by Janene Hawkins RN  Outcome: Met This Shift  11/3/2020 1812 by Monica Glasgow RN  Outcome: Met This Shift  Goal: Absence of physical injury  Description: Absence of physical injury  11/4/2020 0126 by Janene Hawkins RN  Outcome: Met This Shift  11/3/2020 1812 by Monica Glasgow RN  Outcome: Met This Shift     Problem: Confusion - Acute:  Goal: Absence of continued neurological deterioration signs and symptoms  Description: Absence of continued neurological deterioration signs and symptoms  11/4/2020 0126 by Janene Hawkins RN  Outcome: Met This Shift  11/3/2020 1812 by Monica Glasgow RN  Outcome: Met This Shift  Goal: Mental status will be restored to baseline  Description: Mental status will be restored to baseline  11/4/2020 0126 by Janene Hawkins RN  Outcome: Met This Shift  11/3/2020 1812 by Monica Glasgow RN  Outcome: Met This Shift     Problem: Discharge Planning:  Goal: Ability to perform activities of daily living will improve  Description: Ability to perform activities of daily living will improve  11/4/2020 0126 by Janene Hawkins RN  Outcome: Met This Shift  11/3/2020 1812 by Monica Glasgow RN  Outcome: Met This Shift  Goal: Participates in care planning  Description: Participates in care planning  11/4/2020 0126 by Janene Hawkins RN  Outcome: Met This Shift  11/3/2020 1812 by Reshma Quesada RN  Outcome: Met This Shift     Problem: Injury - Risk of, Physical Injury:  Goal: Will remain free from falls  Description: Will remain free from falls  11/4/2020 0126 by Dee Rangel RN  Outcome: Met This Shift  11/3/2020 1812 by Reshma Quesada RN  Outcome: Met This Shift  Goal: Absence of physical injury  Description: Absence of physical injury  11/4/2020 0126 by Dee Rangel RN  Outcome: Met This Shift  11/3/2020 1812 by Reshma Quesada RN  Outcome: Met This Shift     Problem: Mood - Altered:  Goal: Mood stable  Description: Mood stable  11/4/2020 0126 by Dee Rangel RN  Outcome: Met This Shift  11/3/2020 1812 by Reshma Quesada RN  Outcome: Met This Shift  Goal: Absence of abusive behavior  Description: Absence of abusive behavior  11/4/2020 0126 by Dee Rangel RN  Outcome: Met This Shift  11/3/2020 1812 by Reshma Quesada RN  Outcome: Met This Shift  Goal: Verbalizations of feeling emotionally comfortable while being cared for will increase  Description: Verbalizations of feeling emotionally comfortable while being cared for will increase  11/4/2020 0126 by Dee Rangel RN  Outcome: Met This Shift  11/3/2020 1812 by Reshma Quesada RN  Outcome: Met This Shift     Problem: Psychomotor Activity - Altered:  Goal: Absence of psychomotor disturbance signs and symptoms  Description: Absence of psychomotor disturbance signs and symptoms  11/4/2020 0126 by Dee Rangel RN  Outcome: Met This Shift  11/3/2020 1812 by Reshma Quesada RN  Outcome: Met This Shift     Problem: Sensory Perception - Impaired:  Goal: Demonstrations of improved sensory functioning will increase  Description: Demonstrations of improved sensory functioning will increase  11/4/2020 0126 by Dee Rangel RN  Outcome: Met This Shift  11/3/2020 1812 by Reshma Quesada RN  Outcome: Met This Shift  Goal: Decrease in sensory misperception frequency  Description: Decrease in sensory misperception frequency  11/4/2020 0126 by Alma Dover RN  Outcome: Met This Shift  11/3/2020 1812 by Nathalie Fischer RN  Outcome: Met This Shift  Goal: Able to refrain from responding to false sensory perceptions  Description: Able to refrain from responding to false sensory perceptions  11/4/2020 0126 by Alma Dover RN  Outcome: Met This Shift  11/3/2020 1812 by Nathalie Fischer RN  Outcome: Met This Shift  Goal: Demonstrates accurate environmental perceptions  Description: Demonstrates accurate environmental perceptions  11/4/2020 0126 by Alma Dover RN  Outcome: Met This Shift  11/3/2020 1812 by Nathalie Fischer RN  Outcome: Met This Shift  Goal: Able to distinguish between reality-based and nonreality-based thinking  Description: Able to distinguish between reality-based and nonreality-based thinking  11/4/2020 0126 by Alma Dover RN  Outcome: Met This Shift  11/3/2020 1812 by Nathalie Fischer RN  Outcome: Met This Shift  Goal: Able to interrupt nonreality-based thinking  Description: Able to interrupt nonreality-based thinking  11/4/2020 0126 by Alma Dover RN  Outcome: Met This Shift  11/3/2020 1812 by Nathalie Fischer RN  Outcome: Met This Shift     Problem: Sleep Pattern Disturbance:  Goal: Appears well-rested  Description: Appears well-rested  11/4/2020 0126 by Alma Dover RN  Outcome: Met This Shift  11/3/2020 1812 by Nathalie Fischer RN  Outcome: Met This Shift     Problem: Pain:  Goal: Pain level will decrease  Description: Pain level will decrease  11/4/2020 0126 by Alma Dover RN  Outcome: Met This Shift  11/3/2020 1812 by Nathalie Fischer RN  Outcome: Met This Shift  Goal: Control of acute pain  Description: Control of acute pain  11/4/2020 0126 by Alma Dover RN  Outcome: Met This Shift  11/3/2020 1812 by Nathalie Fischer RN  Outcome: Met This Shift  Goal: Control of chronic pain  Description: Control of chronic pain  11/4/2020 0126 by Mary Smith RN  Outcome: Met This Shift  11/3/2020 1812 by Leatha Bonilla RN  Outcome: Met This Shift     Problem: Airway Clearance - Ineffective  Goal: Achieve or maintain patent airway  11/4/2020 0126 by Mary Smith RN  Outcome: Met This Shift  11/3/2020 1812 by Leatha Bonilla RN  Outcome: Met This Shift     Problem: Gas Exchange - Impaired  Goal: Absence of hypoxia  11/4/2020 0126 by Mary Smith RN  Outcome: Met This Shift  11/3/2020 1812 by Leatha Bonilla RN  Outcome: Met This Shift  Goal: Promote optimal lung function  11/4/2020 0126 by Mary Smith RN  Outcome: Met This Shift  11/3/2020 1812 by Leatha Bonilla RN  Outcome: Met This Shift     Problem: Breathing Pattern - Ineffective  Goal: Ability to achieve and maintain a regular respiratory rate  11/4/2020 0126 by Mary Smith RN  Outcome: Met This Shift  11/3/2020 1812 by Leatha Bonilla RN  Outcome: Met This Shift     Problem:  Body Temperature -  Risk of, Imbalanced  Goal: Ability to maintain a body temperature within defined limits  11/4/2020 0126 by Mary Smith RN  Outcome: Met This Shift  11/3/2020 1812 by Leatha Bonilla RN  Outcome: Met This Shift  Goal: Will regain or maintain usual level of consciousness  11/4/2020 0126 by Mary Smith RN  Outcome: Met This Shift  11/3/2020 1812 by Leatha Bonilla RN  Outcome: Met This Shift  Goal: Complications related to the disease process, condition or treatment will be avoided or minimized  11/4/2020 0126 by Mary Smith RN  Outcome: Met This Shift  11/3/2020 1812 by Leatha Bonilla RN  Outcome: Met This Shift     Problem: Isolation Precautions - Risk of Spread of Infection  Goal: Prevent transmission of infection  11/4/2020 0126 by Mary Smith RN  Outcome: Met This Shift  11/3/2020 1812 by Leatha Bonilla RN  Outcome: Met This Shift     Problem: Nutrition Deficits  Goal: Optimize nutrtional status  11/4/2020 0126 by BODØ Marce De Leon RN  Outcome: Met This Shift  11/3/2020 1812 by Jennifer Champagne RN  Outcome: Met This Shift     Problem: Risk for Fluid Volume Deficit  Goal: Maintain normal heart rhythm  11/4/2020 0126 by Eugenie Mantilla RN  Outcome: Met This Shift  11/3/2020 1812 by Jennifer Champagne RN  Outcome: Met This Shift  Goal: Maintain absence of muscle cramping  11/4/2020 0126 by Eugenie Mantilla RN  Outcome: Met This Shift  11/3/2020 1812 by Jennifer Champagne RN  Outcome: Met This Shift  Goal: Maintain normal serum potassium, sodium, calcium, phosphorus, and pH  11/4/2020 0126 by Eugenie Mantilla RN  Outcome: Met This Shift  11/3/2020 1812 by eJnnifer Champagne RN  Outcome: Met This Shift     Problem: Loneliness or Risk for Loneliness  Goal: Demonstrate positive use of time alone when socialization is not possible  11/4/2020 0126 by Eugenie Mantilla RN  Outcome: Met This Shift  11/3/2020 1812 by Jennifer Champagne RN  Outcome: Met This Shift     Problem: Fatigue  Goal: Verbalize increase energy and improved vitality  11/4/2020 0126 by Eugenie Mantilla RN  Outcome: Met This Shift  11/3/2020 1812 by Jennifer Champagne RN  Outcome: Met This Shift     Problem: Patient Education: Go to Patient Education Activity  Goal: Patient/Family Education  11/4/2020 0126 by Eugenie Mantilla RN  Outcome: Met This Shift  11/3/2020 1812 by Jennifer Champagne RN  Outcome: Met This Shift

## 2020-11-04 NOTE — PROGRESS NOTES
Hospitalist Progress Note      PCP: Luan Ham MD    Date of Admission: 11/1/2020    Chief Complaint: *SOB     Hospital Course: ** found to be covid pos, on unasyn for aspiration, received remdesevir and decadron **    Subjective:  Alert but confused       Medications:  Reviewed    Infusion Medications    dextrose      dextrose 75 mL/hr at 11/04/20 0041     Scheduled Medications    ampicillin-sulbactam  3 g Intravenous Q6H    insulin lispro  0-10 Units Subcutaneous 4x Daily AC & HS    miconazole   Topical BID    heparin (porcine)  5,000 Units Subcutaneous 3 times per day    remdesivir IVPB  100 mg Intravenous Q24H    dexamethasone  6 mg Intravenous Daily    latanoprost  1 drop Both Eyes Nightly    albuterol-ipratropium  1 puff Inhalation 6 times per day     PRN Meds: glucose, dextrose, glucagon (rDNA), dextrose, acetaminophen **OR** acetaminophen, guaiFENesin-dextromethorphan, sodium chloride, bisacodyl      Intake/Output Summary (Last 24 hours) at 11/4/2020 1639  Last data filed at 11/4/2020 1459  Gross per 24 hour   Intake 647.97 ml   Output 1000 ml   Net -352.03 ml       Exam:    /68   Pulse 68   Temp 96.1 °F (35.6 °C) (Temporal)   Resp 23   Ht 5' 4\" (1.626 m)   Wt 115 lb (52.2 kg)   SpO2 90%   BMI 19.74 kg/m²       Gen: *well developed  HEENT: NC/AT, moist mucous membranes, no oropharyngeal erythema or exudate  Neck: supple, trachea midline, no anterior cervical or SC LAD  Heart:  HRRR on monitor    Abd:  soft, nontender, nondistended, no masses  Extrem:  No clubbing, cyanosis,  **no edema  Skin: no rashes or lesions  Psych: Not oriented to date, Not oriented to person or Not oriented to place   Capillary Refill: Brisk,< 3 seconds   Peripheral Pulses: +2 palpable, equal bilaterally               Labs:   Recent Labs     11/02/20  0335 11/03/20  0330 11/04/20  0405   WBC 8.8 11.3 12.3*   HGB 13.0 13.2 13.0   HCT 41.6 40.0 39.4    354 355     Recent Labs     11/02/20 0335 11/03/20  0330 11/04/20  0405   * 154* 149*   K 3.6 3.5 3.5   * 118* 116*   CO2 18* 20* 22   BUN 79* 65* 44*   CREATININE 1.1* 0.8 0.5   CALCIUM 8.6 8.8 8.6     Recent Labs     11/02/20  0335 11/03/20  0330 11/04/20  0405   AST 24 19 25   ALT 9 8 8   BILITOT 0.3 0.4 0.5   ALKPHOS 57 55 51     Recent Labs     11/02/20  0335 11/03/20  0330 11/04/20  0405   INR 1.1 1.2 1.4     No results for input(s): Connee Valeria in the last 72 hours. Recent Labs     11/02/20  0335 11/03/20  0330 11/04/20  0405   AST 24 19 25   ALT 9 8 8   BILITOT 0.3 0.4 0.5   ALKPHOS 57 55 51     No results for input(s): LACTA in the last 72 hours.   Lab Results   Component Value Date    URICACID 3.8 12/11/2011     No results found for: AMMONIA    Assessment:    Active Hospital Problems    Diagnosis Date Noted    Prediabetes [R73.03] 11/04/2020    COVID-19 [U07.1] 11/01/2020   aspiration of pneumonia  Hypernatremia  Hypokalemia  Glaucoma     Plan:  Cont unasyn   cont decadron   SSI  Remdesivir(11/2-11/5)        DVT Prophylaxis:  heparin  Diet: Diet NPO Effective Now Exceptions are: Sips with Meds  Code Status: Limited     PT/OT Eval Status: **ordered     Dispo - SNF     Electronically signed by Melida Torre DO on 11/4/2020 at 4:39 PM [place]

## 2020-11-04 NOTE — PROGRESS NOTES
Spoke with daughter for update on patient condition. Understanding of patient not able to eat at this time. Explained that we would need to make decisions on method of nutrition.

## 2020-11-05 LAB
ALBUMIN SERPL-MCNC: 2.9 G/DL (ref 3.5–5.2)
ALP BLD-CCNC: 55 U/L (ref 35–104)
ALT SERPL-CCNC: 13 U/L (ref 0–32)
ANION GAP SERPL CALCULATED.3IONS-SCNC: 14 MMOL/L (ref 7–16)
APTT: 32.6 SEC (ref 24.5–35.1)
AST SERPL-CCNC: 37 U/L (ref 0–31)
BASOPHILS ABSOLUTE: 0 E9/L (ref 0–0.2)
BASOPHILS RELATIVE PERCENT: 0.7 % (ref 0–2)
BILIRUB SERPL-MCNC: 0.6 MG/DL (ref 0–1.2)
BUN BLDV-MCNC: 30 MG/DL (ref 8–23)
BURR CELLS: ABNORMAL
CALCIUM SERPL-MCNC: 8.6 MG/DL (ref 8.6–10.2)
CHLORIDE BLD-SCNC: 113 MMOL/L (ref 98–107)
CO2: 21 MMOL/L (ref 22–29)
CREAT SERPL-MCNC: 0.5 MG/DL (ref 0.5–1)
EOSINOPHILS ABSOLUTE: 0 E9/L (ref 0.05–0.5)
EOSINOPHILS RELATIVE PERCENT: 0 % (ref 0–6)
FIBRINOGEN: 492 MG/DL (ref 225–540)
GFR AFRICAN AMERICAN: >60
GFR NON-AFRICAN AMERICAN: >60 ML/MIN/1.73
GLUCOSE BLD-MCNC: 141 MG/DL (ref 74–99)
HCT VFR BLD CALC: 40.5 % (ref 34–48)
HEMOGLOBIN: 13.5 G/DL (ref 11.5–15.5)
INR BLD: 1.4
LYMPHOCYTES ABSOLUTE: 0.76 E9/L (ref 1.5–4)
LYMPHOCYTES RELATIVE PERCENT: 7 % (ref 20–42)
MCH RBC QN AUTO: 26.8 PG (ref 26–35)
MCHC RBC AUTO-ENTMCNC: 33.3 % (ref 32–34.5)
MCV RBC AUTO: 80.4 FL (ref 80–99.9)
METER GLUCOSE: 135 MG/DL (ref 74–99)
METER GLUCOSE: 154 MG/DL (ref 74–99)
METER GLUCOSE: 169 MG/DL (ref 74–99)
METER GLUCOSE: 192 MG/DL (ref 74–99)
MONOCYTES ABSOLUTE: 0.87 E9/L (ref 0.1–0.95)
MONOCYTES RELATIVE PERCENT: 7.8 % (ref 2–12)
MYELOCYTE PERCENT: 0.9 % (ref 0–0)
NEUTROPHILS ABSOLUTE: 9.27 E9/L (ref 1.8–7.3)
NEUTROPHILS RELATIVE PERCENT: 84.3 % (ref 43–80)
OVALOCYTES: ABNORMAL
PDW BLD-RTO: 15.1 FL (ref 11.5–15)
PLATELET # BLD: 368 E9/L (ref 130–450)
PMV BLD AUTO: 10.9 FL (ref 7–12)
POIKILOCYTES: ABNORMAL
POLYCHROMASIA: ABNORMAL
POTASSIUM SERPL-SCNC: 3.4 MMOL/L (ref 3.5–5)
PROTHROMBIN TIME: 15.6 SEC (ref 9.3–12.4)
RBC # BLD: 5.04 E12/L (ref 3.5–5.5)
SODIUM BLD-SCNC: 148 MMOL/L (ref 132–146)
TOTAL PROTEIN: 6.1 G/DL (ref 6.4–8.3)
WBC # BLD: 10.9 E9/L (ref 4.5–11.5)

## 2020-11-05 PROCEDURE — 6360000002 HC RX W HCPCS: Performed by: INTERNAL MEDICINE

## 2020-11-05 PROCEDURE — 82962 GLUCOSE BLOOD TEST: CPT

## 2020-11-05 PROCEDURE — 6370000000 HC RX 637 (ALT 250 FOR IP): Performed by: INTERNAL MEDICINE

## 2020-11-05 PROCEDURE — 99233 SBSQ HOSP IP/OBS HIGH 50: CPT | Performed by: INTERNAL MEDICINE

## 2020-11-05 PROCEDURE — 85610 PROTHROMBIN TIME: CPT

## 2020-11-05 PROCEDURE — 2580000003 HC RX 258: Performed by: INTERNAL MEDICINE

## 2020-11-05 PROCEDURE — 80053 COMPREHEN METABOLIC PANEL: CPT

## 2020-11-05 PROCEDURE — 2700000000 HC OXYGEN THERAPY PER DAY

## 2020-11-05 PROCEDURE — 2500000003 HC RX 250 WO HCPCS: Performed by: INTERNAL MEDICINE

## 2020-11-05 PROCEDURE — 2580000003 HC RX 258

## 2020-11-05 PROCEDURE — 85730 THROMBOPLASTIN TIME PARTIAL: CPT

## 2020-11-05 PROCEDURE — 85384 FIBRINOGEN ACTIVITY: CPT

## 2020-11-05 PROCEDURE — 2140000000 HC CCU INTERMEDIATE R&B

## 2020-11-05 PROCEDURE — 85025 COMPLETE CBC W/AUTO DIFF WBC: CPT

## 2020-11-05 PROCEDURE — 92610 EVALUATE SWALLOWING FUNCTION: CPT

## 2020-11-05 RX ORDER — PETROLATUM 42 G/100G
OINTMENT TOPICAL 3 TIMES DAILY PRN
Status: DISCONTINUED | OUTPATIENT
Start: 2020-11-05 | End: 2020-11-14 | Stop reason: HOSPADM

## 2020-11-05 RX ORDER — PETROLATUM 42 G/100G
OINTMENT TOPICAL 2 TIMES DAILY
Status: DISCONTINUED | OUTPATIENT
Start: 2020-11-05 | End: 2020-11-14 | Stop reason: HOSPADM

## 2020-11-05 RX ORDER — DEXAMETHASONE SODIUM PHOSPHATE 4 MG/ML
4 INJECTION, SOLUTION INTRA-ARTICULAR; INTRALESIONAL; INTRAMUSCULAR; INTRAVENOUS; SOFT TISSUE DAILY
Status: DISCONTINUED | OUTPATIENT
Start: 2020-11-06 | End: 2020-11-07

## 2020-11-05 RX ORDER — POTASSIUM CHLORIDE 7.45 MG/ML
10 INJECTION INTRAVENOUS
Status: COMPLETED | OUTPATIENT
Start: 2020-11-05 | End: 2020-11-05

## 2020-11-05 RX ORDER — SODIUM CHLORIDE 0.9 % (FLUSH) 0.9 %
SYRINGE (ML) INJECTION
Status: COMPLETED
Start: 2020-11-05 | End: 2020-11-05

## 2020-11-05 RX ADMIN — IPRATROPIUM BROMIDE AND ALBUTEROL 1 PUFF: 20; 100 SPRAY, METERED RESPIRATORY (INHALATION) at 03:33

## 2020-11-05 RX ADMIN — INSULIN LISPRO 2 UNITS: 100 INJECTION, SOLUTION INTRAVENOUS; SUBCUTANEOUS at 17:15

## 2020-11-05 RX ADMIN — PETROLATUM: 42 OINTMENT TOPICAL at 17:26

## 2020-11-05 RX ADMIN — SODIUM CHLORIDE, PRESERVATIVE FREE 10 ML: 5 INJECTION INTRAVENOUS at 10:51

## 2020-11-05 RX ADMIN — IPRATROPIUM BROMIDE AND ALBUTEROL 1 PUFF: 20; 100 SPRAY, METERED RESPIRATORY (INHALATION) at 20:12

## 2020-11-05 RX ADMIN — SODIUM CHLORIDE 3 G: 900 INJECTION INTRAVENOUS at 10:55

## 2020-11-05 RX ADMIN — POTASSIUM CHLORIDE 10 MEQ: 10 INJECTION, SOLUTION INTRAVENOUS at 17:02

## 2020-11-05 RX ADMIN — SODIUM CHLORIDE 3 G: 900 INJECTION INTRAVENOUS at 03:33

## 2020-11-05 RX ADMIN — DEXAMETHASONE SODIUM PHOSPHATE 6 MG: 4 INJECTION, SOLUTION INTRAMUSCULAR; INTRAVENOUS at 10:49

## 2020-11-05 RX ADMIN — HEPARIN SODIUM 5000 UNITS: 10000 INJECTION INTRAVENOUS; SUBCUTANEOUS at 07:10

## 2020-11-05 RX ADMIN — HEPARIN SODIUM 5000 UNITS: 10000 INJECTION INTRAVENOUS; SUBCUTANEOUS at 15:10

## 2020-11-05 RX ADMIN — POTASSIUM CHLORIDE 10 MEQ: 10 INJECTION, SOLUTION INTRAVENOUS at 10:46

## 2020-11-05 RX ADMIN — SODIUM CHLORIDE 3 G: 900 INJECTION INTRAVENOUS at 15:08

## 2020-11-05 RX ADMIN — INSULIN LISPRO 2 UNITS: 100 INJECTION, SOLUTION INTRAVENOUS; SUBCUTANEOUS at 20:42

## 2020-11-05 RX ADMIN — POTASSIUM CHLORIDE 10 MEQ: 10 INJECTION, SOLUTION INTRAVENOUS at 13:04

## 2020-11-05 RX ADMIN — INSULIN LISPRO 2 UNITS: 100 INJECTION, SOLUTION INTRAVENOUS; SUBCUTANEOUS at 07:11

## 2020-11-05 RX ADMIN — HEPARIN SODIUM 5000 UNITS: 10000 INJECTION INTRAVENOUS; SUBCUTANEOUS at 22:43

## 2020-11-05 RX ADMIN — DEXTROSE MONOHYDRATE: 50 INJECTION, SOLUTION INTRAVENOUS at 15:07

## 2020-11-05 RX ADMIN — IPRATROPIUM BROMIDE AND ALBUTEROL 1 PUFF: 20; 100 SPRAY, METERED RESPIRATORY (INHALATION) at 16:58

## 2020-11-05 RX ADMIN — ANTI-FUNGAL POWDER MICONAZOLE NITRATE TALC FREE: 1.42 POWDER TOPICAL at 10:51

## 2020-11-05 RX ADMIN — POTASSIUM CHLORIDE 10 MEQ: 10 INJECTION, SOLUTION INTRAVENOUS at 15:08

## 2020-11-05 RX ADMIN — PETROLATUM: 42 OINTMENT TOPICAL at 12:53

## 2020-11-05 RX ADMIN — SODIUM CHLORIDE 3 G: 900 INJECTION INTRAVENOUS at 20:11

## 2020-11-05 RX ADMIN — LATANOPROST 1 DROP: 50 SOLUTION OPHTHALMIC at 20:11

## 2020-11-05 RX ADMIN — IPRATROPIUM BROMIDE AND ALBUTEROL 1 PUFF: 20; 100 SPRAY, METERED RESPIRATORY (INHALATION) at 12:55

## 2020-11-05 RX ADMIN — REMDESIVIR 100 MG: 100 INJECTION, POWDER, LYOPHILIZED, FOR SOLUTION INTRAVENOUS at 17:53

## 2020-11-05 RX ADMIN — PETROLATUM: 42 OINTMENT TOPICAL at 20:12

## 2020-11-05 RX ADMIN — ANTI-FUNGAL POWDER MICONAZOLE NITRATE TALC FREE: 1.42 POWDER TOPICAL at 20:11

## 2020-11-05 ASSESSMENT — PAIN SCALES - PAIN ASSESSMENT IN ADVANCED DEMENTIA (PAINAD)
BREATHING: 0
NEGVOCALIZATION: 0
FACIALEXPRESSION: 0
CONSOLABILITY: 0
TOTALSCORE: 0
TOTALSCORE: 0
NEGVOCALIZATION: 0
FACIALEXPRESSION: 0
BODYLANGUAGE: 0
BODYLANGUAGE: 0
BREATHING: 0
FACIALEXPRESSION: 0
CONSOLABILITY: 0
FACIALEXPRESSION: 0
NEGVOCALIZATION: 0
CONSOLABILITY: 0
NEGVOCALIZATION: 0
CONSOLABILITY: 0
TOTALSCORE: 0
BODYLANGUAGE: 0
BODYLANGUAGE: 0
BREATHING: 0
BREATHING: 0
TOTALSCORE: 0

## 2020-11-05 NOTE — PROGRESS NOTES
113 11/05/2020    CO2 21 11/05/2020    BUN 30 11/05/2020    CREATININE 0.5 11/05/2020    GFRAA >60 11/05/2020    LABGLOM >60 11/05/2020    GLUCOSE 141 11/05/2020    GLUCOSE 87 04/10/2012    PROT 6.1 11/05/2020    LABALBU 2.9 11/05/2020    LABALBU 4.1 04/10/2012    CALCIUM 8.6 11/05/2020    BILITOT 0.6 11/05/2020    ALKPHOS 55 11/05/2020    AST 37 11/05/2020    ALT 13 11/05/2020         Hepatic Function Panel:    Lab Results   Component Value Date    ALKPHOS 55 11/05/2020    ALT 13 11/05/2020    AST 37 11/05/2020    PROT 6.1 11/05/2020    BILITOT 0.6 11/05/2020    LABALBU 2.9 11/05/2020    LABALBU 4.1 04/10/2012       U/A:    Lab Results   Component Value Date    COLORU Yellow 10/22/2015    PHUR 5.5 10/22/2015    WBCUA 0-1 10/04/2015    WBCUA NONE 11/10/2010    RBCUA NONE 10/04/2015    RBCUA NONE 11/10/2010    BACTERIA MANY 10/04/2015    CLARITYU Clear 10/22/2015    SPECGRAV >=1.030 10/22/2015    LEUKOCYTESUR Negative 10/22/2015    UROBILINOGEN 0.2 10/22/2015    BILIRUBINUR Negative 10/22/2015    BILIRUBINUR NEGATIVE 11/10/2010    BLOODU Negative 10/22/2015    GLUCOSEU Negative 10/22/2015    GLUCOSEU NEGATIVE 11/10/2010       MICROBIOLOGY:    COVID +ve at Nursing home     Radiology :    Chest X ray : Bilateral infiltrates       IMPRESSION:     1. COVID 19 pneumonia   2. Respiratory failure ( oxygen saturation 90 % on 15 L of high flow oxygen )     RECOMMENDATIONS:      1. Decadron 6 mg po q 24 hrs   2. Remdesivir  100 mg IV q 24 hrs  3. Follow LFTs   4. Oxygen supplement

## 2020-11-05 NOTE — CARE COORDINATION
Care Coordination: spoke to daughter ronal today. Reviewing transition of care upon discharged and pt may need karthik prior to returning to assisted living. She understands that pts insurance is not in network for [facility] and is agreeable to referral to [address] [place] [address]. I spoke to Cynthia Ortiz at , records faxed to [phone]. She asked that we follow up with a call to her tomorrow regarding acceptance.   She understands pt is not ready for discharge but she will see if she can accept and follow    Tiffanie Peng    Addendum: Ambulance transport and envelope completed, Hens is not completed as waiting for acceptance    Tiffanie Peng

## 2020-11-05 NOTE — PROGRESS NOTES
Attempted to call [facility] for medication clarification, there was no answer.

## 2020-11-05 NOTE — FLOWSHEET NOTE
Inpatient Wound Care(initial evaluation)  [id number]    Admit Date: 11/1/2020  4:19 AM    Reason for consult:  Heels, right elbow    Significant history:  presents to [facility] ER with shortness of breath. past medical history that includes dementia, rheumatoid arthritis, spinal stenosis  Covid 19 +    Wound history:  Admitted with wounds from facility    Findings:    11/05/20 0930   Skin Color/Condition   Skin Condition/Temp Poor turgor   Skin Integrity   Skin Integrity Bruising; Redness   Location BUE   Skin Integrity Site 2   Skin Integrity Location 2   (purple discolored areas )   Location 2 lower legs, ankles, toes   Skin Integrity Site 3   Skin Integrity Location 3   (vascular discoloration)    Location 3 BLE   Skin Integrity Site 4   Skin Integrity Location 4 Excoriation; Redness   Location 4 under breast   Wound 11/01/20 Elbow Right   Date First Assessed/Time First Assessed: 11/01/20 1154   Present on Hospital Admission: Yes  Location: (!) Elbow  Wound Location Orientation: (c) Right  Wound Outcome: (c)    Wound Image    Wound Etiology Pressure Stage  3   Dressing/Treatment Foam   Wound Length (cm) 1 cm   Wound Width (cm) 1 cm   Wound Depth (cm) 0.1 cm   Wound Surface Area (cm^2) 1 cm^2   Change in Wound Size % (l*w) 94.44   Wound Volume (cm^3) 0.1 cm^3   Wound Assessment   (pink, brown)   Drainage Amount Scant   Drainage Description Yellow   Odor None   Aerlis-wound Assessment Blanchable erythema   Wound 11/01/20 Heel Right;Lateral   Date First Assessed/Time First Assessed: 11/01/20 1945   Present on Hospital Admission: Yes  Primary Wound Type: Pressure Injury  Location: Heel  Wound Location Orientation: Right;Lateral   Wound Image    Wound Etiology Pressure Unstageable   Dressing/Treatment Open to air   Wound Length (cm) 3 cm   Wound Width (cm) 3 cm   Wound Depth (cm)   (unable to determine)   Wound Surface Area (cm^2) 9 cm^2   Change in Wound Size % (l*w) 37.67   Wound Assessment   (black stable )   Drainage Amount None   Arelis-wound Assessment Blanchable erythema   Wound 11/01/20 Heel Left;Lateral   Date First Assessed/Time First Assessed: 11/01/20 1945   Present on Hospital Admission: Yes  Location: Heel  Wound Location Orientation: Left;Lateral   Wound Image    Wound Etiology Pressure Unstageable   Dressing/Treatment Open to air   Wound Length (cm) 2 cm   Wound Width (cm) 2 cm   Wound Depth (cm)   (unable to determine)   Wound Surface Area (cm^2) 4 cm^2   Change in Wound Size % (l*w) 0   Wound Assessment   (yellow, purple)   Drainage Amount None   Arelis-wound Assessment Blanchable erythema     **Informed Consent**    photos taken of wounds and inserted into their chart as part of their permanent medical record for purposes of documentation, treatment management and/or medical review. All Images taken on 11/5/20 of patient name: Amber Allen were transmitted and stored on secured [employer] located within Referral[employer] Tab by a registered [employer] Mobile Application Device.    incontinent of stool  Casey in place  dependent with mobility    Impression: unstageable bilateral heels  Stage 3 right elbow    Plan:  Aquaphor to buttocks  Antifungal under breast  Low air loss module  Foam dressing right elbow  Will need continued preventative are      Alexia Lorenzo 11/5/2020 10:44 AM

## 2020-11-05 NOTE — CONSULTS
GENERAL SURGERY  CONSULT NOTE  11/5/2020    Physician Consulted: Dr. Oswald Willis  Reason for Consult: PEG placement  Referring Physician: Dr. Dennise Cervantes is a 80 y.o. female with PMH of GERD, HTN, dementia, RA presented to the hospital with SOB from her nursing facility and was found to be COVID+. She has baseline dementia and is not oriented. SLP evaluated the patient and observed signs of aspiration with oral intake. They have recommended nothing my mouth, including oral medications and so surgery is consulted for a PEG tube. Family has been informed about this and are agreeable to PEG. However, pt remains with SOB and is requiring 15L O2 by non-rebreather facemask. She does not have any prior abdominal surgeries listed in her chart but is noted to have a large inferior midline scar. No other scars are noted. She has a thin body habitus and has no other contraindications to PEG other than her current high oxygen requirements. Past Medical History:   Diagnosis Date    Anemia     Cellulitis     GERD (gastroesophageal reflux disease)     Glaucoma     Hypertension     Hypokalemia     Insomnia     Osteoarthritis     Prediabetes 11/4/2020    Rheumatoid arthritis(714.0)        Past Surgical History:   Procedure Laterality Date    EYE SURGERY      cataracts    JOINT REPLACEMENT Left     OTHER SURGICAL HISTORY Right 8/7/2015    IM nailing right femor       Medications Prior to Admission:    Prior to Admission medications    Medication Sig Start Date End Date Taking?  Authorizing Provider   coenzyme Q10 100 MG CAPS capsule Take 100 mg by mouth daily   Yes Historical Provider, MD   LUTEIN PO Take 1 capsule by mouth daily   Yes Historical Provider, MD   memantine ER (NAMENDA XR) 14 MG CP24 extended release capsule Take 14 mg by mouth daily   Yes Historical Provider, MD   pantoprazole (PROTONIX) 40 MG tablet Take 40 mg by mouth daily   Yes Historical Provider, MD   RESVERATROL PO Take 5 mg by mouth   Yes Historical Provider, MD   Turmeric 500 MG CAPS Take 1 capsule by mouth daily   Yes Historical Provider, MD   Calcium Carbonate-Vit D-Min (RA CALCIUM PLUS MINERALS/VIT D PO) Take 2 capsules by mouth 2 times daily   Yes Historical Provider, MD   divalproex (DEPAKOTE) 125 MG DR tablet Take 125 mg by mouth 2 times daily Am and at hs   Yes Historical Provider, MD   donepezil (ARICEPT ODT) 5 MG disintegrating tablet Take 5 mg by mouth 2 times daily   Yes Historical Provider, MD   hydrOXYzine (VISTARIL) 25 MG capsule Take 25 mg by mouth 2 times daily Am and afternoon for agitation   Yes Historical Provider, MD   Cranberry 250 MG TABS Take 1 capsule by mouth 3 times daily No dose specified   Yes Historical Provider, MD   NONFORMULARY Take 2 tablets by mouth 2 times daily collegen advance   Yes Historical Provider, MD   latanoprost (XALATAN) 0.005 % ophthalmic solution Place 1 drop into both eyes nightly   Yes Historical Provider, MD   melatonin 5 MG TABS tablet Take 5 mg by mouth nightly   Yes Historical Provider, MD   diclofenac sodium (VOLTAREN) 1 % GEL Apply 2 g topically 2 times daily as needed for Pain Apply topically to the affected areas on knees lower back and shoulders   Yes Historical Provider, MD   polyethylene glycol (GLYCOLAX) 17 g packet Take 17 g by mouth every 48 hours as needed for Constipation   Yes Historical Provider, MD   aspirin-acetaminophen-caffeine (EXCEDRIN MIGRAINE) 250-250-65 MG per tablet Take 12 tablets by mouth every 6 hours as needed for Headaches   Yes Historical Provider, MD   QUEtiapine (SEROQUEL) 25 MG tablet Take 25 mg by mouth 2 times daily Indications: 8am and 5pm   Yes Historical Provider, MD   metoprolol (TOPROL-XL) 50 MG XL tablet Take 50 mg by mouth 2 times daily   Yes Historical Provider, MD   amLODIPine (NORVASC) 2.5 MG tablet Take 2.5 mg by mouth daily   Yes Historical Provider, MD   traMADol (ULTRAM) 50 MG tablet Take 50 mg by mouth 2 times daily.     Yes Historical compare to previous, sob TECHNOLOGIST PROVIDED HISTORY: Reason for exam:->compare to previous, sob What reading provider will be dictating this exam?->CRC FINDINGS: Heart size is unable to be accurately assessed on this single portable view of the chest, but appears to be stable. There are new hazy and patchy airspace opacities throughout the right lung, predominantly affecting the mid and upper lung zones. Difficult to exclude small right pleural effusion. The left lung is clear. Bones are diffusely osteopenic. 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 This is unchanged dating back to 2015. New airspace opacities throughout the right lung suggestive of aspiration/pneumonia. Asymmetric pulmonary edema could have a similar appearance. Xr Chest Portable    Result Date: 11/1/2020  EXAMINATION: ONE XRAY VIEW OF THE CHEST 11/1/2020 5:07 am COMPARISON: 10/21/2015 HISTORY: ORDERING SYSTEM PROVIDED HISTORY: chest pain TECHNOLOGIST PROVIDED HISTORY: Reason for exam:->chest pain What reading provider will be dictating this exam?->CRC FINDINGS: There are bilateral infiltrates with predominantly peripheral distribution. There is no pleural effusion or pneumothorax seen. There is no cardiomegaly or pulmonary vascular congestion. There are old right-sided rib fractures. 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 Bilateral infiltrates.          ASSESSMENT:  80 y.o. female with dementia, GERD, HTN presents with COVID and is now unable to swallow safely    PLAN:  - Patient is not a candidate for PEG procedure at this time  - Recommend Corpak until respiratory status is improved - requiring 6L NC or less  - Management per primary    Discussed with Dr. Sherren Gibney     Electronically signed by Abdulaziz Gutiérrez MD on 11/5/20 at 4:47 PM EST

## 2020-11-05 NOTE — PROGRESS NOTES
SPEECH/LANGUAGE PATHOLOGY  BEDSIDE SWALLOWING EVALUATION    PATIENT NAME:  Teresa Diaz      :  1925          TODAY'S DATE:  2020 ROOM:  [address] [place] [address]      SUMMARY OF EVALUATION     DYSPHAGIA DIAGNOSIS:  Marked oropharyngeal dysphagia      DIET RECOMMENDATIONS:  NPO (nothing by mouth including oral meds)       FEEDING RECOMMENDATIONS:     Assistance level:  Not applicable      Compensatory strategies recommended: Not applicable    THERAPY RECOMMENDATIONS:      Dysphagia therapy is not recommended                   PROCEDURE     Consistencies Administered During the Evaluation   Liquids: thin liquid   Solids:  pureed foods      Method of Intake:   spoon  Fed by clinician      Position:   Seated, upright                  RESULTS     Oral Stage:         Delayed A-P transit due to: cognitive function  and Oral residuals were noted :  throughout the oral cavity      Pharyngeal Stage:      Latent wet cough was noted after presentation of thin liquid and pureed foods, Wet respirations were noted after presentation of thin liquid and pureed foods, Multiple swallows were noted after presentation of thin liquid and pureed foods and Delayed initiation of the pharyngeal swallow noted                  CPT code:  91370  bedside swallow eval      [x]The admitting diagnosis and active problem list, as listed below have been reviewed prior to initiation of this evaluation.      ADMITTING DIAGNOSIS: COVID-19 [U07.1]  COVID-19 [U07.1]     ACTIVE PROBLEM LIST:   Patient Active Problem List   Diagnosis    DVT (deep venous thrombosis) (HCC)    Normocytic anemia    Rheumatoid arthritis (HCC)    Cervical spondylosis    Lumbar spondylosis    Shoulder joint dysfunction    DDD (degenerative disc disease)    Spinal stenosis    Vertebral compression fracture     Bulging lumbar disc    Cervical spinal canal tumor    Dementia associated with other underlying disease with behavioral disturbance ([facility] [address]    COVID-19    Prediabetes

## 2020-11-05 NOTE — PROGRESS NOTES
Attempted to wean patient from 15L NRB to 15L HF NC. Patients oxygen decreased to 88%. 15L NRB reapplied oxygen 99%.

## 2020-11-05 NOTE — PROGRESS NOTES
[facility]  Department of Internal Medicine  Division of Pulmonary, Critical Care and Sleep Medicine  Progress Note    Jennifer Espino DO, [facility], [facility], Kimberly Herring MD, [facility]    Patient: Dara Greer  MRN: [id number]  : 1925    Encounter Time: 5:48 PM     Date of Admission: 2020  4:19 AM    Primary Care Physician: Rico Morales MD    Reason for Consultation: CoVID 19       SUBJECTIVE:     15 liters oxygen  Requesting PEG tube per family  -25o ml          OBJECTIVE:     PHYSICAL EXAM:   VITALS:     Intake/Output Summary (Last 24 hours) at 2020 1748  Last data filed at 2020 1747  Gross per 24 hour   Intake 0 ml   Output 1050 ml   Net -1050 ml        CONSTITUTIONAL:    Alert   SKIN:     Mild skin discoloration  HEENT:     No thrush  NECK:    No bruits, No JVP apprechiated  CV:      AS  murmur, No rubs, No gallops  PULMONARY:   Couse BS,  No Wheezing, No Rales, No Rhonchi      No noted egophony  ABDOMEN:     Soft, non-tender. BS normal. No R/R/G  EXT:    No deformities . No clubbing. trace lower extremity edema, No venous stasis  PULSE:   Appears equal and palpable.   PSYCHIATRIC:  No acute psycosis  MS:    Gross weakness  NEUROLOGIC:   The clinical assessment is non-focal     DATA: IMAGING & TESTING:     LABORATORY TESTS:    CBC:   Lab Results   Component Value Date    WBC 10.9 2020    RBC 5.04 2020    HGB 13.5 2020    HCT 40.5 2020    MCV 80.4 2020    MCH 26.8 2020    MCHC 33.3 2020    RDW 15.1 2020     2020    MPV 10.9 2020     CMP:    Lab Results   Component Value Date     2020    K 3.4 2020    K 3.5 2020     2020    CO2 21 2020    BUN 30 2020    CREATININE 0.5 2020    GFRAA >60 2020    LABGLOM >60 2020    GLUCOSE 141 2020    GLUCOSE 87 04/10/2012    PROT 6.1 2020    LABALBU 2.9 2020 LABALBU 4.1 04/10/2012    CALCIUM 8.6 11/05/2020    BILITOT 0.6 11/05/2020    ALKPHOS 55 11/05/2020    AST 37 11/05/2020    ALT 13 11/05/2020     PT/INR:    Lab Results   Component Value Date    PROTIME 15.6 11/05/2020    INR 1.4 11/05/2020     ABG:  No results found for: PH, PCO2, PO2, HCO3, BE, THGB, TCO2, O2SAT  TSH:    Lab Results   Component Value Date    TSH 1.230 08/08/2015     FERRITIN:    Lab Results   Component Value Date    FERRITIN 999 11/01/2020     Fibrinogen Level:  No components found for: FIB     PRO-BNP: No results found for: PROBNP   ABGs: No results found for: PH, PO2, PCO2  Hemoglobin A1C: No components found for: HGBA1C    IMAGING:  Imaging tests were completed and reviewed and discussed radiology and care team involved and reveals   Xr Chest Portable    Result Date: 11/2/2020  EXAMINATION: ONE XRAY VIEW OF THE CHEST 11/2/2020 9:29 am COMPARISON: November 1, 2020; October 21, 2015 HISTORY: ORDERING SYSTEM PROVIDED HISTORY: compare to previous, sob TECHNOLOGIST PROVIDED HISTORY: Reason for exam:->compare to previous, sob What reading provider will be dictating this exam?->CRC FINDINGS: Heart size is unable to be accurately assessed on this single portable view of the chest, but appears to be stable. There are new hazy and patchy airspace opacities throughout the right lung, predominantly affecting the mid and upper lung zones. Difficult to exclude small right pleural effusion. The left lung is clear. Bones are diffusely osteopenic. 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 This is unchanged dating back to 2015. New airspace opacities throughout the right lung suggestive of aspiration/pneumonia. Asymmetric pulmonary edema could have a similar appearance.      Xr Chest Portable    Result Date: 11/1/2020  EXAMINATION: ONE XRAY VIEW OF THE CHEST 11/1/2020 5:07 am COMPARISON: 10/21/2015 HISTORY: ORDERING SYSTEM PROVIDED HISTORY: chest pain TECHNOLOGIST PROVIDED HISTORY: Reason for exam:->chest pain What reading provider will be dictating this exam?->CRC FINDINGS: There are bilateral infiltrates with predominantly peripheral distribution. There is no pleural effusion or pneumothorax seen. There is no cardiomegaly or pulmonary vascular congestion. There are old right-sided rib fractures. 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 Bilateral infiltrates. Assessment:   1. COVID 19 pneumonia   2. Respiratory failure ( oxygen saturation 93 % on 8 L of high flow oxygen )   3. Leukocytosis, elevated procalcitonin, hyperferritinemia   4. Hyperglycemia  5. Dementia  6. Lumbar and cervical disc disease            Plan:   1. ID following on Unasyn  2. Continue decadron 4 mg daily   3. Titrate oxygen if able  4. NPO aspiration assessment noted, PEG tube requested  5. Remdesivir to be finished 11/5   6.  HOB elevated at all time        Jrody Colvin, MPH, [facility]  Professor of Medicine  Pulmonary, Critical Care and Sleep Medicine

## 2020-11-05 NOTE — PROGRESS NOTES
Hospitalist Progress Note      PCP: Patyon Diaz MD    Date of Admission: 11/1/2020    Chief Complaint: *SOB     Hospital Course: ** found to be covid pos, on unasyn for aspiration, received remdesevir and decadron ** she is aspirating and spoke with the family and they are requesting a PEG, surgery was consulted**        Subjective: **daughter concerned about not taking meds or eating      Medications:  Reviewed    Infusion Medications    dextrose      dextrose 75 mL/hr at 11/05/20 1507     Scheduled Medications    potassium chloride  10 mEq Intravenous Q2H    mineral oil-hydrophilic petrolatum   Topical BID    ampicillin-sulbactam  3 g Intravenous Q6H    insulin lispro  0-10 Units Subcutaneous 4x Daily AC & HS    miconazole   Topical BID    heparin (porcine)  5,000 Units Subcutaneous 3 times per day    remdesivir IVPB  100 mg Intravenous Q24H    dexamethasone  6 mg Intravenous Daily    latanoprost  1 drop Both Eyes Nightly    albuterol-ipratropium  1 puff Inhalation 6 times per day     PRN Meds: mineral oil-hydrophilic petrolatum **AND** mineral oil-hydrophilic petrolatum, glucose, dextrose, glucagon (rDNA), dextrose, acetaminophen **OR** acetaminophen, guaiFENesin-dextromethorphan, sodium chloride, bisacodyl      Intake/Output Summary (Last 24 hours) at 11/5/2020 1513  Last data filed at 11/5/2020 0528  Gross per 24 hour   Intake 0 ml   Output 700 ml   Net -700 ml       Exam:    /82   Pulse 88   Temp 96.9 °F (36.1 °C) (Temporal)   Resp 28   Ht 5' 4\" (1.626 m)   Wt 115 lb (52.2 kg)   SpO2 97%   BMI 19.74 kg/m²       Gen: *well developed  HEENT: NC/AT, moist mucous membranes,  Neck: supple, trachea midline, no anterior cervical or SC LAD  Heart:  HRRR on monitor    Abd:  soft, nontender, nondistended, no masses  Extrem:  No clubbing, cyanosis,  **no edema  Skin: no rashes or lesions  Psych: Not oriented to date, Not oriented to person or Not oriented to place   Capillary Refill: Brisk,< 3 seconds   Peripheral Pulses: +2 palpable, equal bilaterally                Labs:   Recent Labs     11/03/20  0330 11/04/20  0405 11/05/20  0130   WBC 11.3 12.3* 10.9   HGB 13.2 13.0 13.5   HCT 40.0 39.4 40.5    355 368     Recent Labs     11/03/20  0330 11/04/20  0405 11/05/20  0130   * 149* 148*   K 3.5 3.5 3.4*   * 116* 113*   CO2 20* 22 21*   BUN 65* 44* 30*   CREATININE 0.8 0.5 0.5   CALCIUM 8.8 8.6 8.6     Recent Labs     11/03/20  0330 11/04/20  0405 11/05/20  0130   AST 19 25 37*   ALT 8 8 13   BILITOT 0.4 0.5 0.6   ALKPHOS 55 51 55     Recent Labs     11/03/20  0330 11/04/20  0405 11/05/20  0130   INR 1.2 1.4 1.4     No results for input(s): Kamala Belts in the last 72 hours. Recent Labs     11/03/20  0330 11/04/20  0405 11/05/20  0130   AST 19 25 37*   ALT 8 8 13   BILITOT 0.4 0.5 0.6   ALKPHOS 55 51 55     No results for input(s): LACTA in the last 72 hours.   Lab Results   Component Value Date    URICACID 3.8 12/11/2011     No results found for: AMMONIA    Assessment:    Active Hospital Problems    Diagnosis Date Noted    Prediabetes [R73.03] 11/04/2020    COVID-19 [U07.1] 11/01/2020   aspiration of pneumonia  Hypernatremia  Hypokalemia  Glaucoma   Aspiration     Plan:  Cont unasyn   cont decadron   SSI  Remdesivir(11/2-11/5)   for PEG     DVT Prophylaxis:  heparin  Diet: Diet NPO Effective Now Exceptions are: Sips with Meds  Code Status: Limited     PT/OT Eval Status: **ordered     Dispo - SNF           Electronically signed by Debbie Clemons DO on 11/5/2020 at 3:13 PM [place]

## 2020-11-06 ENCOUNTER — APPOINTMENT (OUTPATIENT)
Dept: GENERAL RADIOLOGY | Age: 85
DRG: 177 | End: 2020-11-06
Payer: COMMERCIAL

## 2020-11-06 LAB
ALBUMIN SERPL-MCNC: 2.5 G/DL (ref 3.5–5.2)
ALP BLD-CCNC: 50 U/L (ref 35–104)
ALT SERPL-CCNC: 12 U/L (ref 0–32)
ANION GAP SERPL CALCULATED.3IONS-SCNC: 10 MMOL/L (ref 7–16)
APTT: 31.1 SEC (ref 24.5–35.1)
AST SERPL-CCNC: 23 U/L (ref 0–31)
BASOPHILS ABSOLUTE: 0 E9/L (ref 0–0.2)
BASOPHILS RELATIVE PERCENT: 0.2 % (ref 0–2)
BILIRUB SERPL-MCNC: 0.4 MG/DL (ref 0–1.2)
BLOOD CULTURE, ROUTINE: NORMAL
BUN BLDV-MCNC: 23 MG/DL (ref 8–23)
BURR CELLS: ABNORMAL
CALCIUM SERPL-MCNC: 8.2 MG/DL (ref 8.6–10.2)
CHLORIDE BLD-SCNC: 109 MMOL/L (ref 98–107)
CO2: 21 MMOL/L (ref 22–29)
CREAT SERPL-MCNC: 0.4 MG/DL (ref 0.5–1)
CULTURE, BLOOD 2: NORMAL
EOSINOPHILS ABSOLUTE: 0 E9/L (ref 0.05–0.5)
EOSINOPHILS RELATIVE PERCENT: 0.1 % (ref 0–6)
FIBRINOGEN: 382 MG/DL (ref 225–540)
GFR AFRICAN AMERICAN: >60
GFR NON-AFRICAN AMERICAN: >60 ML/MIN/1.73
GLUCOSE BLD-MCNC: 171 MG/DL (ref 74–99)
HCT VFR BLD CALC: 37 % (ref 34–48)
HEMOGLOBIN: 12 G/DL (ref 11.5–15.5)
INR BLD: 1.3
LYMPHOCYTES ABSOLUTE: 0.42 E9/L (ref 1.5–4)
LYMPHOCYTES RELATIVE PERCENT: 3.5 % (ref 20–42)
MCH RBC QN AUTO: 26.4 PG (ref 26–35)
MCHC RBC AUTO-ENTMCNC: 32.4 % (ref 32–34.5)
MCV RBC AUTO: 81.3 FL (ref 80–99.9)
METAMYELOCYTES RELATIVE PERCENT: 0.9 % (ref 0–1)
METER GLUCOSE: 107 MG/DL (ref 74–99)
METER GLUCOSE: 125 MG/DL (ref 74–99)
METER GLUCOSE: 143 MG/DL (ref 74–99)
MONOCYTES ABSOLUTE: 0.52 E9/L (ref 0.1–0.95)
MONOCYTES RELATIVE PERCENT: 5.3 % (ref 2–12)
NEUTROPHILS ABSOLUTE: 9.46 E9/L (ref 1.8–7.3)
NEUTROPHILS RELATIVE PERCENT: 90.4 % (ref 43–80)
PDW BLD-RTO: 15 FL (ref 11.5–15)
PLATELET # BLD: 330 E9/L (ref 130–450)
PMV BLD AUTO: 10.7 FL (ref 7–12)
POIKILOCYTES: ABNORMAL
POTASSIUM SERPL-SCNC: 3.9 MMOL/L (ref 3.5–5)
PROTHROMBIN TIME: 14.9 SEC (ref 9.3–12.4)
RBC # BLD: 4.55 E12/L (ref 3.5–5.5)
SODIUM BLD-SCNC: 140 MMOL/L (ref 132–146)
TOTAL PROTEIN: 5.4 G/DL (ref 6.4–8.3)
WBC # BLD: 10.4 E9/L (ref 4.5–11.5)

## 2020-11-06 PROCEDURE — 2580000003 HC RX 258: Performed by: INTERNAL MEDICINE

## 2020-11-06 PROCEDURE — 85730 THROMBOPLASTIN TIME PARTIAL: CPT

## 2020-11-06 PROCEDURE — 74018 RADEX ABDOMEN 1 VIEW: CPT

## 2020-11-06 PROCEDURE — 36415 COLL VENOUS BLD VENIPUNCTURE: CPT

## 2020-11-06 PROCEDURE — 80053 COMPREHEN METABOLIC PANEL: CPT

## 2020-11-06 PROCEDURE — 6360000002 HC RX W HCPCS: Performed by: INTERNAL MEDICINE

## 2020-11-06 PROCEDURE — 2700000000 HC OXYGEN THERAPY PER DAY

## 2020-11-06 PROCEDURE — 2140000000 HC CCU INTERMEDIATE R&B

## 2020-11-06 PROCEDURE — 6370000000 HC RX 637 (ALT 250 FOR IP): Performed by: INTERNAL MEDICINE

## 2020-11-06 PROCEDURE — 85025 COMPLETE CBC W/AUTO DIFF WBC: CPT

## 2020-11-06 PROCEDURE — 85610 PROTHROMBIN TIME: CPT

## 2020-11-06 PROCEDURE — 82962 GLUCOSE BLOOD TEST: CPT

## 2020-11-06 PROCEDURE — 2709999900 HC NON-CHARGEABLE SUPPLY

## 2020-11-06 PROCEDURE — 85384 FIBRINOGEN ACTIVITY: CPT

## 2020-11-06 PROCEDURE — 99233 SBSQ HOSP IP/OBS HIGH 50: CPT | Performed by: INTERNAL MEDICINE

## 2020-11-06 RX ORDER — DIVALPROEX SODIUM 125 MG/1
125 TABLET, DELAYED RELEASE ORAL 2 TIMES DAILY
Status: DISCONTINUED | OUTPATIENT
Start: 2020-11-06 | End: 2020-11-06 | Stop reason: SDUPTHER

## 2020-11-06 RX ORDER — DIVALPROEX SODIUM 125 MG/1
125 CAPSULE, COATED PELLETS ORAL 2 TIMES DAILY
Status: DISCONTINUED | OUTPATIENT
Start: 2020-11-06 | End: 2020-11-14 | Stop reason: HOSPADM

## 2020-11-06 RX ORDER — DONEPEZIL HYDROCHLORIDE 5 MG/1
5 TABLET, FILM COATED ORAL 2 TIMES DAILY
Status: DISCONTINUED | OUTPATIENT
Start: 2020-11-06 | End: 2020-11-14 | Stop reason: HOSPADM

## 2020-11-06 RX ORDER — MEMANTINE HYDROCHLORIDE 10 MG/1
10 TABLET ORAL 2 TIMES DAILY
Status: DISCONTINUED | OUTPATIENT
Start: 2020-11-06 | End: 2020-11-14 | Stop reason: HOSPADM

## 2020-11-06 RX ORDER — QUETIAPINE FUMARATE 25 MG/1
25 TABLET, FILM COATED ORAL 2 TIMES DAILY
Status: DISCONTINUED | OUTPATIENT
Start: 2020-11-06 | End: 2020-11-14 | Stop reason: HOSPADM

## 2020-11-06 RX ORDER — LATANOPROST 50 UG/ML
1 SOLUTION/ DROPS OPHTHALMIC NIGHTLY
Status: DISCONTINUED | OUTPATIENT
Start: 2020-11-06 | End: 2020-11-06

## 2020-11-06 RX ADMIN — ANTI-FUNGAL POWDER MICONAZOLE NITRATE TALC FREE: 1.42 POWDER TOPICAL at 22:07

## 2020-11-06 RX ADMIN — DEXAMETHASONE SODIUM PHOSPHATE 4 MG: 4 INJECTION, SOLUTION INTRAMUSCULAR; INTRAVENOUS at 08:08

## 2020-11-06 RX ADMIN — IPRATROPIUM BROMIDE AND ALBUTEROL 1 PUFF: 20; 100 SPRAY, METERED RESPIRATORY (INHALATION) at 08:13

## 2020-11-06 RX ADMIN — HEPARIN SODIUM 5000 UNITS: 10000 INJECTION INTRAVENOUS; SUBCUTANEOUS at 22:43

## 2020-11-06 RX ADMIN — IPRATROPIUM BROMIDE AND ALBUTEROL 1 PUFF: 20; 100 SPRAY, METERED RESPIRATORY (INHALATION) at 16:35

## 2020-11-06 RX ADMIN — PETROLATUM: 42 OINTMENT TOPICAL at 22:07

## 2020-11-06 RX ADMIN — SODIUM CHLORIDE 3 G: 900 INJECTION INTRAVENOUS at 20:34

## 2020-11-06 RX ADMIN — HEPARIN SODIUM 5000 UNITS: 10000 INJECTION INTRAVENOUS; SUBCUTANEOUS at 05:17

## 2020-11-06 RX ADMIN — DONEPEZIL HYDROCHLORIDE 5 MG: 5 TABLET, FILM COATED ORAL at 20:31

## 2020-11-06 RX ADMIN — IPRATROPIUM BROMIDE AND ALBUTEROL 1 PUFF: 20; 100 SPRAY, METERED RESPIRATORY (INHALATION) at 00:50

## 2020-11-06 RX ADMIN — QUETIAPINE FUMARATE 25 MG: 25 TABLET ORAL at 20:31

## 2020-11-06 RX ADMIN — DEXTROSE MONOHYDRATE: 50 INJECTION, SOLUTION INTRAVENOUS at 02:56

## 2020-11-06 RX ADMIN — DIVALPROEX SODIUM 125 MG: 125 CAPSULE, COATED PELLETS ORAL at 15:22

## 2020-11-06 RX ADMIN — DIVALPROEX SODIUM 125 MG: 125 CAPSULE, COATED PELLETS ORAL at 20:30

## 2020-11-06 RX ADMIN — IPRATROPIUM BROMIDE AND ALBUTEROL 1 PUFF: 20; 100 SPRAY, METERED RESPIRATORY (INHALATION) at 11:35

## 2020-11-06 RX ADMIN — MEMANTINE HYDROCHLORIDE 10 MG: 10 TABLET, FILM COATED ORAL at 20:30

## 2020-11-06 RX ADMIN — PETROLATUM: 42 OINTMENT TOPICAL at 08:17

## 2020-11-06 RX ADMIN — HEPARIN SODIUM 5000 UNITS: 10000 INJECTION INTRAVENOUS; SUBCUTANEOUS at 14:18

## 2020-11-06 RX ADMIN — LATANOPROST 1 DROP: 50 SOLUTION OPHTHALMIC at 20:39

## 2020-11-06 RX ADMIN — SODIUM CHLORIDE 3 G: 900 INJECTION INTRAVENOUS at 02:55

## 2020-11-06 RX ADMIN — IPRATROPIUM BROMIDE AND ALBUTEROL 1 PUFF: 20; 100 SPRAY, METERED RESPIRATORY (INHALATION) at 04:13

## 2020-11-06 RX ADMIN — SODIUM CHLORIDE 3 G: 900 INJECTION INTRAVENOUS at 08:10

## 2020-11-06 RX ADMIN — SODIUM CHLORIDE 3 G: 900 INJECTION INTRAVENOUS at 14:14

## 2020-11-06 RX ADMIN — IPRATROPIUM BROMIDE AND ALBUTEROL 1 PUFF: 20; 100 SPRAY, METERED RESPIRATORY (INHALATION) at 20:02

## 2020-11-06 RX ADMIN — ANTI-FUNGAL POWDER MICONAZOLE NITRATE TALC FREE: 1.42 POWDER TOPICAL at 08:12

## 2020-11-06 RX ADMIN — INSULIN LISPRO 2 UNITS: 100 INJECTION, SOLUTION INTRAVENOUS; SUBCUTANEOUS at 08:18

## 2020-11-06 ASSESSMENT — PAIN SCALES - PAIN ASSESSMENT IN ADVANCED DEMENTIA (PAINAD)
TOTALSCORE: 0
BODYLANGUAGE: 0
CONSOLABILITY: 0
FACIALEXPRESSION: 0
BREATHING: 0
NEGVOCALIZATION: 0

## 2020-11-06 ASSESSMENT — PAIN SCALES - GENERAL: PAINLEVEL_OUTOF10: 0

## 2020-11-06 NOTE — CARE COORDINATION
SOCIAL WORK/DISCHARGE PLANNING;  Care coordination; follow up call to Lamar Stevenson of [address] [place] [address]. Left message for her to return call. Pt not medically ready for discharge at this time. She remains on 15L&NRB. Per QFR rounds, pt to have cor-tanya placed. Pt is NPO, failed swallow assessment. Nelia Host \[facility] [place] [facility]\""  [id number] with Mira Mohan, she will follow and reassess once pt is more medically stable.   Nelia Host \[facility] [place] [facility]\""  [phone]

## 2020-11-06 NOTE — PROGRESS NOTES
Hospitalist Progress Note      PCP: Mauro Nicole MD    Date of Admission: 11/1/2020    Chief Complaint:   SOB     Hospital Course: ** found to be covid pos, on unasyn for aspiration, received remdesevir and decadron ** she is aspirating and spoke with the family and they are requesting a PEG, surgery was consulted** not a candidate for PEG due to oxygen needs, has a core pack, and psych meds restarted.   To start tube feeds  **     Subjective: ** resting quietly       Medications:  Reviewed    Infusion Medications    dextrose      dextrose 75 mL/hr at 11/06/20 0256     Scheduled Medications    divalproex  125 mg Oral BID    QUEtiapine  25 mg Oral BID    memantine  10 mg Oral BID    donepezil  5 mg Oral BID    mineral oil-hydrophilic petrolatum   Topical BID    dexamethasone  4 mg Intravenous Daily    ampicillin-sulbactam  3 g Intravenous Q6H    insulin lispro  0-10 Units Subcutaneous 4x Daily AC & HS    miconazole   Topical BID    heparin (porcine)  5,000 Units Subcutaneous 3 times per day    latanoprost  1 drop Both Eyes Nightly    albuterol-ipratropium  1 puff Inhalation 6 times per day     PRN Meds: mineral oil-hydrophilic petrolatum **AND** mineral oil-hydrophilic petrolatum, glucose, dextrose, glucagon (rDNA), dextrose, acetaminophen **OR** acetaminophen, guaiFENesin-dextromethorphan, sodium chloride, bisacodyl      Intake/Output Summary (Last 24 hours) at 11/6/2020 1439  Last data filed at 11/6/2020 0540  Gross per 24 hour   Intake 727 ml   Output 800 ml   Net -73 ml       Exam:    BP (!) 110/56   Pulse 86   Temp 96.8 °F (36 °C) (Axillary)   Resp (!) 31   Ht 5' 4\" (1.626 m)   Wt 115 lb (52.2 kg)   SpO2 99%   BMI 19.74 kg/m²       Gen: *well developed  HEENT: NC/AT, moist mucous membranes,  Neck: supple, trachea midline,   Heart:  HRRR on monitor    Abd:  soft, nontender, nondistended, no masses  Extrem:  No clubbing, cyanosis,  **no edema  Skin: no rashes or lesions  Psych: Not oriented to date, Not oriented to person or Not oriented to place   Capillary Refill: Brisk,< 3 seconds   Peripheral Pulses: +2 palpable, equal bilaterally               Labs:   Recent Labs     11/04/20 0405 11/05/20 0130 11/06/20 0513   WBC 12.3* 10.9 10.4   HGB 13.0 13.5 12.0   HCT 39.4 40.5 37.0    368 330     Recent Labs     11/04/20 0405 11/05/20 0130 11/06/20 0513   * 148* 140   K 3.5 3.4* 3.9   * 113* 109*   CO2 22 21* 21*   BUN 44* 30* 23   CREATININE 0.5 0.5 0.4*   CALCIUM 8.6 8.6 8.2*     Recent Labs     11/04/20 0405 11/05/20 0130 11/06/20  0513   AST 25 37* 23   ALT 8 13 12   BILITOT 0.5 0.6 0.4   ALKPHOS 51 55 50     Recent Labs     11/04/20 0405 11/05/20 0130 11/06/20  0513   INR 1.4 1.4 1.3     No results for input(s): Aedle Lowers in the last 72 hours. Recent Labs     11/04/20 0405 11/05/20 0130 11/06/20  0513   AST 25 37* 23   ALT 8 13 12   BILITOT 0.5 0.6 0.4   ALKPHOS 51 55 50     No results for input(s): LACTA in the last 72 hours.   Lab Results   Component Value Date    URICACID 3.8 12/11/2011     No results found for: AMMONIA    Assessment:    Active Hospital Problems    Diagnosis Date Noted    Prediabetes [R73.03] 11/04/2020    COVID-19 [U07.1] 11/01/2020   aspiration of pneumonia  Hypernatremia  Hypokalemia  Glaucoma   Aspiration     Plan:  Cont unasyn   cont decadron   SSI  Remdesivir(11/2-11/5)   for core pack     DVT Prophylaxis:  heparin  Diet: Diet NPO Effective Now Exceptions are: Sips with Meds  Code Status: Limited     PT/OT Eval Status: **ordered     Dispo - SNF      Electronically signed by Julia Bhakta DO on 11/6/2020 at 2:39 PM [facility]

## 2020-11-06 NOTE — PROGRESS NOTES
Comprehensive Nutrition Assessment    Type and Reason for Visit:  Reassess    Nutrition Recommendations/Plan: Tube Feeding recommendation if needed. Recommend Diabetic (Glucerna 1.2) @40/hr plus 1 protein modular to provide 960ml, 1152 calories, 58g protein, 773ml water (1256 calories and 84g protein w/ 1 protein modular daily). This formula and rate meets 100% of patients calorie and protein needs. Nutrition Assessment:  Pt remains NPO x 5 days since admission w/ planned [place] ; s/p small bowel feeding tube insertion 11/6 ; pt failed swallow study ; marked oropharyngeal dysphagia per speech ; pt also COVID-19 positive ; pt at further nutritional risk d/t increased needs from wound healing ; adm w/ acute hypoxic respiratory failure ; hx of RA ; noted aspiration pneumonia ; will provide TF recommendations    Malnutrition Assessment:  Malnutrition Status: At risk for malnutrition (Comment)    Context:  Acute Illness     Findings of the 6 clinical characteristics of malnutrition:  Energy Intake:  7 - 50% or less of estimated energy requirements for 5 or more days  Weight Loss:  Unable to assess(d/t lack of weight history)     Body Fat Loss:  Unable to assess(data not available to assess at this time d/t COVID isolation)     Muscle Mass Loss:  Unable to assess(data not available to assess at this time d/t COVID isolation)    Fluid Accumulation:  No significant fluid accumulation     Strength:  Not Performed    Estimated Daily Nutrient Needs:  Energy (kcal):  6470-0621 ( x 1.3 SF); Weight Used for Energy Requirements:  Current     Protein (g):  65-85 (1.3-1.5g/kg CBW);  Weight Used for Protein Requirements:  Current        Fluid (ml/day):  1941-0979; Method Used for Fluid Requirements:  1 ml/kcal      Nutrition Related Findings:  I&Os WNL, no edema, active BS, loose stools, U/L dentures, tremors, A&O x 1, poor skin turgor, redness to buttocks, excoriation to skin folds, [place]      Wounds:  Multiple, Open Wounds, Stage III, Unstageable(wounds x 3 noted)       Current Nutrition Therapies:    Diet NPO Effective Now Exceptions are: Sips with Meds    Anthropometric Measures:  · Height: 5' 4\" (162.6 cm)  · Current Body Weight: 115 lb (52.2 kg)(11/1/20, no method)   · Admission Body Weight: 115 lb (52.2 kg)(11/1/20, no method)    · Usual Body Weight: (UTO ; no weights available in EMR hx from previous encounters)     · Ideal Body Weight: 120 lbs; % Ideal Body Weight 95.8 %   · BMI: 19.7  · BMI Categories: Normal Weight (BMI 22.0 to 24.9) age over 72       Nutrition Diagnosis:   · Inadequate oral intake related to cognitive or neurological impairment as evidenced by NPO or clear liquid status due to medical condition, poor intake prior to admission, nutrition support - enteral nutrition, swallow study results      Nutrition Interventions:   Food and/or Nutrient Delivery:  Continue NPO, Start Tube Feeding  Nutrition Education/Counseling:  Education not indicated   Coordination of Nutrition Care:  Continue to monitor while inpatient, Speech Therapy, Swallow Evaluation    Goals:  Tube feeding will meet nutritional needs with good tolerance       Nutrition Monitoring and Evaluation:   Behavioral-Environmental Outcomes:  Beliefs and Attitutes   Food/Nutrient Intake Outcomes:  Enteral Nutrition Intake/Tolerance  Physical Signs/Symptoms Outcomes:  Biochemical Data, Chewing or Swallowing, GI Status, Fluid Status or Edema, Hemodynamic Status, Nutrition Focused Physical Findings, Skin, Weight     Discharge Planning:     Too soon to determine     Electronically signed by Catherine Dodd RD, LD on 11/6/20 at 12:32 PM EST    Contact: [phone]

## 2020-11-06 NOTE — PROGRESS NOTES
[facility]  Department of Internal Medicine  Division of Pulmonary, Critical Care and Sleep Medicine  Progress Note    Cami Lucas DO, Tahir Vanessa, Malini Michael MD, [facility]    Patient: Eitan Lang  MRN: [id number]  : 1925    Encounter Time: 5:43 PM     Date of Admission: 2020  4:19 AM    Primary Care Physician: Yo Herndon MD    Reason for Consultation: CoVID 19       SUBJECTIVE:     15 liters oxygen  Requesting PEG tube per family  not a candidate for PEG due to oxygen needs, has a core pack, and psych meds restarted. To start tube feeds  OBJECTIVE:     PHYSICAL EXAM:   VITALS:     Intake/Output Summary (Last 24 hours) at 2020 1743  Last data filed at 2020 0540  Gross per 24 hour   Intake 727 ml   Output 800 ml   Net -73 ml        CONSTITUTIONAL:   Alert   SKIN:     Mild skin discoloration  HEENT:     No thrush  NECK:    No bruits, No JVP apprechiated  CV:      AS  murmur, No rubs, No gallops  PULMONARY:   Couse BS,  No Wheezing, No Rales, No Rhonchi      No noted egophony  ABDOMEN:     Soft, non-tender. BS normal. No R/R/G  EXT:    No deformities . No clubbing. trace lower extremity edema, No venous stasis  PULSE:   Appears equal and palpable.   PSYCHIATRIC:  No acute psycosis  MS:    Gross weakness  NEUROLOGIC:   The clinical assessment is non-focal     DATA: IMAGING & TESTING:     LABORATORY TESTS:    CBC:   Lab Results   Component Value Date    WBC 10.4 2020    RBC 4.55 2020    HGB 12.0 2020    HCT 37.0 2020    MCV 81.3 2020    MCH 26.4 2020    MCHC 32.4 2020    RDW 15.0 2020     2020    MPV 10.7 2020     CMP:    Lab Results   Component Value Date     2020    K 3.9 2020    K 3.5 2020     2020    CO2 21 2020    BUN 23 2020    CREATININE 0.4 2020    GFRAA >60 2020    LABGLOM >60 2020    GLUCOSE COMPARISON: 10/21/2015 HISTORY: ORDERING SYSTEM PROVIDED HISTORY: chest pain TECHNOLOGIST PROVIDED HISTORY: Reason for exam:->chest pain What reading provider will be dictating this exam?->CRC FINDINGS: There are bilateral infiltrates with predominantly peripheral distribution. There is no pleural effusion or pneumothorax seen. There is no cardiomegaly or pulmonary vascular congestion. There are old right-sided rib fractures. 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 Bilateral infiltrates. Assessment:   1. COVID 19 pneumonia   2. Respiratory failure ( oxygen saturation 93 % on 8 L of high flow oxygen )   3. Leukocytosis, elevated procalcitonin, hyperferritinemia   4. Hyperglycemia  5. Dementia  6. Lumbar and cervical disc disease            Plan:   1. ID following on Unasyn  2. Continue decadron 4 mg daily   3. Titrate oxygen if able  4. NPO aspiration assessment noted, PEG tube requested now with Core tract  5. Remdesivir to be finished 11/5   6.  HOB elevated at all time        Jordy Colvin, MPH, [facility] Boggs  Professor of Medicine  Pulmonary, Critical Care and Sleep Medicine

## 2020-11-06 NOTE — PROGRESS NOTES
Department of Internal Medicine  Infectious Diseases  Progress  Note      C/C :  COVID 19 pneumonia     Pt is awake , non verbal, non communicating   Afebrile       Current Facility-Administered Medications   Medication Dose Route Frequency Provider Last Rate Last Dose    divalproex (DEPAKOTE) DR tablet 125 mg  125 mg Oral BID Bhavik Mony Gregory, DO        QUEtiapine (SEROQUEL) tablet 25 mg  25 mg Oral BID Bhavik Mony Gregory, DO        memantine [facility]) tablet 10 mg  10 mg Oral BID Bhavik Mony Gregory, DO        donepezil (ARICEPT ODT) disintegrating tablet 5 mg  5 mg Oral BID Bhavik Mony Gregory, DO        mineral oil-hydrophilic petrolatum (HYDROPHOR) ointment   Topical BID Bhavik Mony Gregory, DO        And    mineral oil-hydrophilic petrolatum (HYDROPHOR) ointment   Topical TID PRN El Enma, DO        dexamethasone (DECADRON) injection 4 mg  4 mg Intravenous Daily Fermin Nichols, DO   4 mg at 11/06/20 0808    ampicillin-sulbactam (UNASYN) 3 g ivpb minibag  3 g Intravenous Q6H Paige Huitron,  mL/hr at 11/06/20 1414 3 g at 11/06/20 1414    insulin lispro (HUMALOG) injection vial 0-10 Units  0-10 Units Subcutaneous 4x Daily AC & HS Bhavik Moratayarie Gregory, DO   2 Units at 11/06/20 0818    glucose (GLUTOSE) 40 % oral gel 15 g  15 g Oral PRN Bhavik Moratayarie Gregory, DO        dextrose 50 % IV solution  12.5 g Intravenous PRN El Enma, DO        glucagon (rDNA) injection 1 mg  1 mg Intramuscular PRN Bhavik Moratayarie Gregory, DO        dextrose 5 % solution  100 mL/hr Intravenous PRN Bhavik Moratayarie Gregory, DO        dextrose 5 % solution   Intravenous Continuous Bhavik Mony Gregory, DO 75 mL/hr at 11/06/20 0256      miconazole (MICOTIN) 2 % powder   Topical BID Paige Huitron,         heparin (porcine) injection 5,000 Units  5,000 Units Subcutaneous 3 times per day Brent Mendoza, DO   5,000 Units at 11/06/20 0517    acetaminophen (TYLENOL) tablet 650 mg  650 mg Oral Q6H PRN Kwasi Aguirre DO        Or    acetaminophen (TYLENOL) suppository 650 mg  650 mg Rectal Q6H PRN Kwasi Aguirre DO        guaiFENesin-dextromethorphan (ROBITUSSIN DM) 100-10 MG/5ML syrup 5 mL  5 mL Oral Q4H PRN Paige Huitron DO        0.9 % sodium chloride bolus  30 mL Intravenous PRN Eric Evans MD        latanoprost (XALATAN) 0.005 % ophthalmic solution 1 drop  1 drop Both Eyes Nightly Paige Huitron DO   1 drop at 11/05/20 2011    bisacodyl (DULCOLAX) suppository 10 mg  10 mg Rectal PRN Paige Huitron DO        albuterol-ipratropium (COMBIVENT RESPIMAT)  MCG/ACT inhaler 1 puff  1 puff Inhalation 6 times per day Kwasi Aguirre DO   1 puff at 11/06/20 1135         REVIEW OF SYSTEMS:  Could not be obtained       PHYSICAL EXAM:      Vitals:     Vitals:    11/06/20 0800   BP: (!) 110/56   Pulse: 86   Resp: (!) 31   Temp: 96.8 °F (36 °C)   SpO2: 99%       General Appearance:    Awake, non communicating . Head:    Normocephalic, atraumatic   Eyes:    No pallor, no icterus,   Ears:    No obvious deformity or drainage.    Nose:   No nasal drainage   Throat:  Dry oral mucosa    Neck:   Supple, no lymphadenopathy   Lungs:     Diminished breath sound    Heart:     Regular     Abdomen:     Soft, non-tender, bowel sounds present    Extremities:   No edema, no cyanosis   Pulses:   Dorsalis pedis palpable    Skin:   no rashes or lesions        Lab Results   Component Value Date    WBC 10.4 11/06/2020    RBC 4.55 11/06/2020    HGB 12.0 11/06/2020    HCT 37.0 11/06/2020     11/06/2020    MCV 81.3 11/06/2020    MCH 26.4 11/06/2020    MCHC 32.4 11/06/2020    RDW 15.0 11/06/2020    SEGSPCT 58 05/06/2013    METASPCT 0.9 11/06/2020    LYMPHOPCT 3.5 11/06/2020    MONOPCT 5.3 11/06/2020    MYELOPCT 0.9 11/05/2020    BASOPCT 0.2 11/06/2020    MONOSABS 0.52 11/06/2020    LYMPHSABS 0.42 11/06/2020    EOSABS 0.00 11/06/2020    BASOSABS 0.00 11/06/2020       CMP     Lab Results   Component Value Date     11/06/2020    K 3.9 11/06/2020    K 3.5 11/03/2020     11/06/2020    CO2 21 11/06/2020    BUN 23 11/06/2020    CREATININE 0.4 11/06/2020    GFRAA >60 11/06/2020    LABGLOM >60 11/06/2020    GLUCOSE 171 11/06/2020    GLUCOSE 87 04/10/2012    PROT 5.4 11/06/2020    LABALBU 2.5 11/06/2020    LABALBU 4.1 04/10/2012    CALCIUM 8.2 11/06/2020    BILITOT 0.4 11/06/2020    ALKPHOS 50 11/06/2020    AST 23 11/06/2020    ALT 12 11/06/2020         Hepatic Function Panel:    Lab Results   Component Value Date    ALKPHOS 50 11/06/2020    ALT 12 11/06/2020    AST 23 11/06/2020    PROT 5.4 11/06/2020    BILITOT 0.4 11/06/2020    LABALBU 2.5 11/06/2020    LABALBU 4.1 04/10/2012       U/A:    Lab Results   Component Value Date    COLORU Yellow 10/22/2015    PHUR 5.5 10/22/2015    WBCUA 0-1 10/04/2015    WBCUA NONE 11/10/2010    RBCUA NONE 10/04/2015    RBCUA NONE 11/10/2010    BACTERIA MANY 10/04/2015    CLARITYU Clear 10/22/2015    SPECGRAV >=1.030 10/22/2015    LEUKOCYTESUR Negative 10/22/2015    UROBILINOGEN 0.2 10/22/2015    BILIRUBINUR Negative 10/22/2015    BILIRUBINUR NEGATIVE 11/10/2010    BLOODU Negative 10/22/2015    GLUCOSEU Negative 10/22/2015    GLUCOSEU NEGATIVE 11/10/2010       MICROBIOLOGY:    COVID +ve at Nursing home     Radiology :    Chest X ray : Bilateral infiltrates       IMPRESSION:     1. COVID 19 pneumonia   2. Respiratory failure ( oxygen saturation 90 % on 15 L of high flow oxygen )     RECOMMENDATIONS:      1. Decadron 6 mg po q 24 hrs   2. Remdesivir  100 mg IV q 24 hrs ( day 5)   3. Follow LFTs   4. Oxygen supplement

## 2020-11-07 LAB
METER GLUCOSE: 136 MG/DL (ref 74–99)
METER GLUCOSE: 142 MG/DL (ref 74–99)
METER GLUCOSE: 155 MG/DL (ref 74–99)
METER GLUCOSE: 159 MG/DL (ref 74–99)

## 2020-11-07 PROCEDURE — 2580000003 HC RX 258: Performed by: INTERNAL MEDICINE

## 2020-11-07 PROCEDURE — 2580000003 HC RX 258

## 2020-11-07 PROCEDURE — 2700000000 HC OXYGEN THERAPY PER DAY

## 2020-11-07 PROCEDURE — 2140000000 HC CCU INTERMEDIATE R&B

## 2020-11-07 PROCEDURE — 6370000000 HC RX 637 (ALT 250 FOR IP): Performed by: INTERNAL MEDICINE

## 2020-11-07 PROCEDURE — 99232 SBSQ HOSP IP/OBS MODERATE 35: CPT | Performed by: INTERNAL MEDICINE

## 2020-11-07 PROCEDURE — 6360000002 HC RX W HCPCS: Performed by: INTERNAL MEDICINE

## 2020-11-07 PROCEDURE — 82962 GLUCOSE BLOOD TEST: CPT

## 2020-11-07 RX ORDER — DEXAMETHASONE SODIUM PHOSPHATE 4 MG/ML
2 INJECTION, SOLUTION INTRA-ARTICULAR; INTRALESIONAL; INTRAMUSCULAR; INTRAVENOUS; SOFT TISSUE DAILY
Status: DISCONTINUED | OUTPATIENT
Start: 2020-11-08 | End: 2020-11-09

## 2020-11-07 RX ORDER — SODIUM CHLORIDE 0.9 % (FLUSH) 0.9 %
SYRINGE (ML) INJECTION
Status: COMPLETED
Start: 2020-11-07 | End: 2020-11-07

## 2020-11-07 RX ADMIN — DONEPEZIL HYDROCHLORIDE 5 MG: 5 TABLET, FILM COATED ORAL at 20:54

## 2020-11-07 RX ADMIN — MEMANTINE HYDROCHLORIDE 10 MG: 10 TABLET, FILM COATED ORAL at 20:54

## 2020-11-07 RX ADMIN — HEPARIN SODIUM 5000 UNITS: 10000 INJECTION INTRAVENOUS; SUBCUTANEOUS at 21:28

## 2020-11-07 RX ADMIN — LATANOPROST 1 DROP: 50 SOLUTION OPHTHALMIC at 21:27

## 2020-11-07 RX ADMIN — IPRATROPIUM BROMIDE AND ALBUTEROL 1 PUFF: 20; 100 SPRAY, METERED RESPIRATORY (INHALATION) at 04:00

## 2020-11-07 RX ADMIN — INSULIN LISPRO 2 UNITS: 100 INJECTION, SOLUTION INTRAVENOUS; SUBCUTANEOUS at 21:30

## 2020-11-07 RX ADMIN — SODIUM CHLORIDE 3 G: 900 INJECTION INTRAVENOUS at 09:05

## 2020-11-07 RX ADMIN — ANTI-FUNGAL POWDER MICONAZOLE NITRATE TALC FREE: 1.42 POWDER TOPICAL at 20:54

## 2020-11-07 RX ADMIN — DIVALPROEX SODIUM 125 MG: 125 CAPSULE, COATED PELLETS ORAL at 20:54

## 2020-11-07 RX ADMIN — DIVALPROEX SODIUM 125 MG: 125 CAPSULE, COATED PELLETS ORAL at 09:06

## 2020-11-07 RX ADMIN — DEXTROSE MONOHYDRATE: 50 INJECTION, SOLUTION INTRAVENOUS at 19:14

## 2020-11-07 RX ADMIN — DEXAMETHASONE SODIUM PHOSPHATE 4 MG: 4 INJECTION, SOLUTION INTRAMUSCULAR; INTRAVENOUS at 09:06

## 2020-11-07 RX ADMIN — SODIUM CHLORIDE, PRESERVATIVE FREE 10 ML: 5 INJECTION INTRAVENOUS at 09:06

## 2020-11-07 RX ADMIN — IPRATROPIUM BROMIDE AND ALBUTEROL 1 PUFF: 20; 100 SPRAY, METERED RESPIRATORY (INHALATION) at 00:23

## 2020-11-07 RX ADMIN — ANTI-FUNGAL POWDER MICONAZOLE NITRATE TALC FREE: 1.42 POWDER TOPICAL at 09:10

## 2020-11-07 RX ADMIN — HEPARIN SODIUM 5000 UNITS: 10000 INJECTION INTRAVENOUS; SUBCUTANEOUS at 14:05

## 2020-11-07 RX ADMIN — SODIUM CHLORIDE 3 G: 900 INJECTION INTRAVENOUS at 14:58

## 2020-11-07 RX ADMIN — HEPARIN SODIUM 5000 UNITS: 10000 INJECTION INTRAVENOUS; SUBCUTANEOUS at 07:12

## 2020-11-07 RX ADMIN — PETROLATUM: 42 OINTMENT TOPICAL at 20:54

## 2020-11-07 RX ADMIN — DONEPEZIL HYDROCHLORIDE 5 MG: 5 TABLET, FILM COATED ORAL at 09:06

## 2020-11-07 RX ADMIN — SODIUM CHLORIDE 3 G: 900 INJECTION INTRAVENOUS at 20:54

## 2020-11-07 RX ADMIN — QUETIAPINE FUMARATE 25 MG: 25 TABLET ORAL at 20:54

## 2020-11-07 RX ADMIN — INSULIN LISPRO 2 UNITS: 100 INJECTION, SOLUTION INTRAVENOUS; SUBCUTANEOUS at 17:21

## 2020-11-07 RX ADMIN — MEMANTINE HYDROCHLORIDE 10 MG: 10 TABLET, FILM COATED ORAL at 09:06

## 2020-11-07 RX ADMIN — SODIUM CHLORIDE 3 G: 900 INJECTION INTRAVENOUS at 03:25

## 2020-11-07 RX ADMIN — QUETIAPINE FUMARATE 25 MG: 25 TABLET ORAL at 09:06

## 2020-11-07 RX ADMIN — IPRATROPIUM BROMIDE AND ALBUTEROL 1 PUFF: 20; 100 SPRAY, METERED RESPIRATORY (INHALATION) at 09:10

## 2020-11-07 RX ADMIN — PETROLATUM: 42 OINTMENT TOPICAL at 09:10

## 2020-11-07 ASSESSMENT — PAIN SCALES - GENERAL
PAINLEVEL_OUTOF10: 0

## 2020-11-07 NOTE — PROGRESS NOTES
Hospitalist Progress Note      PCP: Shanelle Matos MD    Date of Admission: 11/1/2020    Chief Complaint: *SOB     Hospital Course: ** found to be covid pos, on unasyn for aspiration, received remdesevir and decadron ** she is aspirating and spoke with the family and they are requesting a PEG, surgery was consulted** not a candidate for PEG due to oxygen needs, has a core pack, and psych meds restarted.   started tube feeds   **     Subjective:   No complaints       Medications:  Reviewed    Infusion Medications    dextrose      dextrose 75 mL/hr at 11/06/20 0256     Scheduled Medications    albuterol-ipratropium  1 puff Inhalation Q6H    [START ON 11/8/2020] dexamethasone  2 mg Intravenous Daily    QUEtiapine  25 mg Oral BID    memantine  10 mg Oral BID    donepezil  5 mg Oral BID    divalproex  125 mg Oral BID    mineral oil-hydrophilic petrolatum   Topical BID    ampicillin-sulbactam  3 g Intravenous Q6H    insulin lispro  0-10 Units Subcutaneous 4x Daily AC & HS    miconazole   Topical BID    heparin (porcine)  5,000 Units Subcutaneous 3 times per day    latanoprost  1 drop Both Eyes Nightly     PRN Meds: mineral oil-hydrophilic petrolatum **AND** mineral oil-hydrophilic petrolatum, glucose, dextrose, glucagon (rDNA), dextrose, acetaminophen **OR** acetaminophen, guaiFENesin-dextromethorphan, sodium chloride, bisacodyl      Intake/Output Summary (Last 24 hours) at 11/7/2020 1608  Last data filed at 11/7/2020 1437  Gross per 24 hour   Intake 0 ml   Output 1950 ml   Net -1950 ml       Exam:    /68   Pulse 81   Temp 97 °F (36.1 °C) (Temporal)   Resp 22   Ht 5' 4\" (1.626 m)   Wt 115 lb (52.2 kg)   SpO2 100%   BMI 19.74 kg/m²       Gen: *well developed  HEENT:  moist mucous membranes,  Neck: supple, trachea midline,   Heart:  HRRR on monitor    Abd:  soft, nontender, nondistended, no masses  Extrem:  No clubbing, cyanosis,  **no edema  Skin: no rashes or lesions  Psych: Not oriented

## 2020-11-07 NOTE — PLAN OF CARE
Problem: Skin Integrity:  Goal: Will show no infection signs and symptoms  Description: Will show no infection signs and symptoms  Outcome: Met This Shift  Goal: Absence of new skin breakdown  Description: Absence of new skin breakdown  Outcome: Met This Shift     Problem: Falls - Risk of:  Goal: Will remain free from falls  Description: Will remain free from falls  Outcome: Met This Shift  Goal: Absence of physical injury  Description: Absence of physical injury  Outcome: Met This Shift     Problem: Injury - Risk of, Physical Injury:  Goal: Will remain free from falls  Description: Will remain free from falls  Outcome: Met This Shift  Goal: Absence of physical injury  Description: Absence of physical injury  Outcome: Met This Shift     Problem: Mood - Altered:  Goal: Absence of abusive behavior  Description: Absence of abusive behavior  Outcome: Met This Shift     Problem: Sleep Pattern Disturbance:  Goal: Appears well-rested  Description: Appears well-rested  Outcome: Met This Shift     Problem: Airway Clearance - Ineffective  Goal: Achieve or maintain patent airway  Outcome: Met This Shift     Problem: Gas Exchange - Impaired  Goal: Absence of hypoxia  Outcome: Met This Shift

## 2020-11-07 NOTE — PROGRESS NOTES
Verified initial start rate of tube feed for pt with Dr. Kalpana Partida. She would like it started at 40 ml/hr.

## 2020-11-07 NOTE — PROGRESS NOTES
Department of Internal Medicine  Infectious Diseases  Progress  Note      C/C :  COVID 19 pneumonia     Pt is awake , non verbal, non communicating   Afebrile   Continues to have high supplemental oxygen requirements      Current Facility-Administered Medications   Medication Dose Route Frequency Provider Last Rate Last Dose    albuterol-ipratropium (COMBIVENT RESPIMAT)  MCG/ACT inhaler 1 puff  1 puff Inhalation Q6H Marv Nichols DO        [START ON 11/8/2020] dexamethasone (DECADRON) injection 2 mg  2 mg Intravenous Daily Marv Nichols DO        QUEtiapine (SEROQUEL) tablet 25 mg  25 mg Oral BID Denice Bora, DO   25 mg at 11/07/20 0906    memantine (NAMENDA) tablet 10 mg  10 mg Oral BID Denice Bora, DO   10 mg at 11/07/20 2721    donepezil (ARICEPT) tablet 5 mg  5 mg Oral BID Denice Bora, DO   5 mg at 11/07/20 7336    divalproex (DEPAKOTE SPRINKLE) capsule 125 mg  125 mg Oral BID Denice Bora, DO   125 mg at 11/07/20 8862    mineral oil-hydrophilic petrolatum (HYDROPHOR) ointment   Topical BID Bhavik Nanetta Salem, DO        And    mineral oil-hydrophilic petrolatum (HYDROPHOR) ointment   Topical TID PRN Denice Bora, DO        ampicillin-sulbactam (UNASYN) 3 g ivpb minibag  3 g Intravenous Q6H Paige BONIFACIO Elana,  mL/hr at 11/07/20 1458 3 g at 11/07/20 1458    insulin lispro (HUMALOG) injection vial 0-10 Units  0-10 Units Subcutaneous 4x Daily AC & HS Bhavik Nanetta Salem, DO   2 Units at 11/06/20 0818    glucose (GLUTOSE) 40 % oral gel 15 g  15 g Oral PRN Bhavik Nanetta Salem, DO        dextrose 50 % IV solution  12.5 g Intravenous PRN Denice Bora, DO        glucagon (rDNA) injection 1 mg  1 mg Intramuscular PRN Denice Bora, DO        dextrose 5 % solution  100 mL/hr Intravenous PRN Bhavik Nanetta Salem, DO        dextrose 5 % solution   Intravenous Continuous Denice Bora, DO 75 mL/hr at 11/06/20 0256      miconazole (MICOTIN) 2 % powder   Topical BID Celestino Hendricks DO        heparin (porcine) injection 5,000 Units  5,000 Units Subcutaneous 3 times per day Celestino Hendricks DO   5,000 Units at 11/07/20 1405    acetaminophen (TYLENOL) tablet 650 mg  650 mg Oral Q6H PRN Celestino Hendricks DO        Or    acetaminophen (TYLENOL) suppository 650 mg  650 mg Rectal Q6H PRN Paige Huitron DO        guaiFENesin-dextromethorphan (ROBITUSSIN DM) 100-10 MG/5ML syrup 5 mL  5 mL Oral Q4H PRN Paige Huitron DO        0.9 % sodium chloride bolus  30 mL Intravenous PRN Eric Evans MD        latanoprost (XALATAN) 0.005 % ophthalmic solution 1 drop  1 drop Both Eyes Nightly Paige Huitron DO   1 drop at 11/06/20 2039    bisacodyl (DULCOLAX) suppository 10 mg  10 mg Rectal PRN Celestino Hendricks DO             REVIEW OF SYSTEMS:  Could not be obtained       PHYSICAL EXAM:      Vitals:     Vitals:    11/07/20 1224   BP:    Pulse: 81   Resp: 22   Temp: 97 °F (36.1 °C)   SpO2: 100%       General Appearance:    Awake, non communicating . Head:    Normocephalic, atraumatic   Eyes:    No pallor, no icterus,   Ears:    No obvious deformity or drainage.    Nose:   No nasal drainage   Throat:  Dry oral mucosa    Neck:   Supple, no lymphadenopathy   Lungs:     Diminished breath sound    Heart:     Regular     Abdomen:     Soft, non-tender, bowel sounds present    Extremities:   No edema, no cyanosis   Pulses:   Dorsalis pedis palpable    Skin:   no rashes or lesions        Lab Results   Component Value Date    WBC 10.4 11/06/2020    RBC 4.55 11/06/2020    HGB 12.0 11/06/2020    HCT 37.0 11/06/2020     11/06/2020    MCV 81.3 11/06/2020    MCH 26.4 11/06/2020    MCHC 32.4 11/06/2020    RDW 15.0 11/06/2020    SEGSPCT 58 05/06/2013    METASPCT 0.9 11/06/2020    LYMPHOPCT 3.5 11/06/2020    MONOPCT 5.3 11/06/2020    MYELOPCT 0.9 11/05/2020    BASOPCT 0.2 11/06/2020    MONOSABS 0.52 11/06/2020    LYMPHSABS 0.42 11/06/2020    EOSABS 0.00 11/06/2020    BASOSABS 0.00 11/06/2020       CMP     Lab Results   Component Value Date     11/06/2020    K 3.9 11/06/2020    K 3.5 11/03/2020     11/06/2020    CO2 21 11/06/2020    BUN 23 11/06/2020    CREATININE 0.4 11/06/2020    GFRAA >60 11/06/2020    LABGLOM >60 11/06/2020    GLUCOSE 171 11/06/2020    GLUCOSE 87 04/10/2012    PROT 5.4 11/06/2020    LABALBU 2.5 11/06/2020    LABALBU 4.1 04/10/2012    CALCIUM 8.2 11/06/2020    BILITOT 0.4 11/06/2020    ALKPHOS 50 11/06/2020    AST 23 11/06/2020    ALT 12 11/06/2020         Hepatic Function Panel:    Lab Results   Component Value Date    ALKPHOS 50 11/06/2020    ALT 12 11/06/2020    AST 23 11/06/2020    PROT 5.4 11/06/2020    BILITOT 0.4 11/06/2020    LABALBU 2.5 11/06/2020    LABALBU 4.1 04/10/2012       U/A:    Lab Results   Component Value Date    COLORU Yellow 10/22/2015    PHUR 5.5 10/22/2015    WBCUA 0-1 10/04/2015    WBCUA NONE 11/10/2010    RBCUA NONE 10/04/2015    RBCUA NONE 11/10/2010    BACTERIA MANY 10/04/2015    CLARITYU Clear 10/22/2015    SPECGRAV >=1.030 10/22/2015    LEUKOCYTESUR Negative 10/22/2015    UROBILINOGEN 0.2 10/22/2015    BILIRUBINUR Negative 10/22/2015    BILIRUBINUR NEGATIVE 11/10/2010    BLOODU Negative 10/22/2015    GLUCOSEU Negative 10/22/2015    GLUCOSEU NEGATIVE 11/10/2010       MICROBIOLOGY:    COVID +ve at Nursing home     Radiology :    Chest X ray : Bilateral infiltrates       IMPRESSION:     1. COVID 19 pneumonia, s/p remdesivir  2. Respiratory failure ( oxygen saturation 90 % on 15 L of high flow oxygen )    RECOMMENDATIONS:      1. Decadron 6 mg po q 24 hrs   2. Follow LFTs   3. Oxygen supplement   4. Continue ampicillin-sulbactam until tomorrow.

## 2020-11-07 NOTE — PLAN OF CARE
Problem: Falls - Risk of:  Goal: Will remain free from falls  Description: Will remain free from falls  Outcome: Met This Shift     Problem: Falls - Risk of:  Goal: Absence of physical injury  Description: Absence of physical injury  Outcome: Met This Shift     Problem: Injury - Risk of, Physical Injury:  Goal: Will remain free from falls  Description: Will remain free from falls  Outcome: Met This Shift     Problem: Injury - Risk of, Physical Injury:  Goal: Absence of physical injury  Description: Absence of physical injury  Outcome: Met This Shift     Problem: Pain:  Goal: Pain level will decrease  Description: Pain level will decrease  Outcome: Met This Shift     Problem: Pain:  Goal: Control of acute pain  Description: Control of acute pain  Outcome: Met This Shift

## 2020-11-07 NOTE — CONSULTS
Noted consult for TF recs. Nutrition assessment completed w/ EN recs provided, see most recent RD progress note 11/6. Estimated Daily Nutrient Needs:  Energy (kcal):  2529-1006 ( x 1.3 SF)  Protein (g):  65-85 (1.3-1.5g/kg CBW)    To meet total needs, recommend Diabetic @ 40 ml/hr +1 protein modular daily to provide 960 ml tv, 1152 kcal, 58 gm pro, 773 ml free water (1252 kcal, 84 gm pro)    Will follow as scheduled.  Thank you  Electronically signed by Fadia Ortiz MS, RD, LD on 11/7/2020 at 10:04 AM

## 2020-11-07 NOTE — PROGRESS NOTES
[facility]  Department of Internal Medicine  Division of Pulmonary, Critical Care and Sleep Medicine  Progress Note    Didi Hassan DO, Gasper Smart MD, [facility]    Patient: Lucita Elmore  MRN: [id number]  : 1925    Encounter Time: 1:23 PM     Date of Admission: 2020  4:19 AM    Primary Care Physician: David Burger MD    Reason for Consultation: CoVID 19       SUBJECTIVE:     15 liters oxygen still? Requesting PEG tube per family HOWEVER, not a candidate for PEG due to oxygen needs, has a core pack, and psych meds restarted. To start tube feeds  BS noted  Now negative 1.9 liters    OBJECTIVE:     PHYSICAL EXAM:   VITALS:     Intake/Output Summary (Last 24 hours) at 2020 1323  Last data filed at 2020 0713  Gross per 24 hour   Intake 0 ml   Output 1950 ml   Net -1950 ml        CONSTITUTIONAL:   Alert   SKIN:     Mild skin discoloration  HEENT:     No thrush  NECK:    No bruits, No JVP apprechiated  CV:      AS  murmur, No rubs, No gallops  PULMONARY:   Couse BS,  No Wheezing, No Rales, No Rhonchi      No noted egophony  ABDOMEN:     Soft, non-tender. BS normal. No R/R/G  EXT:    No deformities . No clubbing. trace lower extremity edema, No venous stasis  PULSE:   Appears equal and palpable.   PSYCHIATRIC:  No acute psycosis  MS:    Gross weakness  NEUROLOGIC:   The clinical assessment is non-focal     DATA: IMAGING & TESTING:     LABORATORY TESTS:    CBC:   Lab Results   Component Value Date    WBC 10.4 2020    RBC 4.55 2020    HGB 12.0 2020    HCT 37.0 2020    MCV 81.3 2020    MCH 26.4 2020    MCHC 32.4 2020    RDW 15.0 2020     2020    MPV 10.7 2020     CMP:    Lab Results   Component Value Date     2020    K 3.9 2020    K 3.5 2020     2020    CO2 21 2020    BUN 23 2020    CREATININE 0.4 2020 GFRAA >60 11/06/2020    LABGLOM >60 11/06/2020    GLUCOSE 171 11/06/2020    GLUCOSE 87 04/10/2012    PROT 5.4 11/06/2020    LABALBU 2.5 11/06/2020    LABALBU 4.1 04/10/2012    CALCIUM 8.2 11/06/2020    BILITOT 0.4 11/06/2020    ALKPHOS 50 11/06/2020    AST 23 11/06/2020    ALT 12 11/06/2020     PT/INR:    Lab Results   Component Value Date    PROTIME 14.9 11/06/2020    INR 1.3 11/06/2020     ABG:  No results found for: PH, PCO2, PO2, HCO3, BE, THGB, TCO2, O2SAT  TSH:    Lab Results   Component Value Date    TSH 1.230 08/08/2015     FERRITIN:    Lab Results   Component Value Date    FERRITIN 999 11/01/2020     Fibrinogen Level:  No components found for: FIB     PRO-BNP: No results found for: PROBNP   ABGs: No results found for: PH, PO2, PCO2  Hemoglobin A1C: No components found for: HGBA1C    IMAGING:  Imaging tests were completed and reviewed and discussed radiology and care team involved and reveals   Xr Chest Portable    Result Date: 11/2/2020  EXAMINATION: ONE XRAY VIEW OF THE CHEST 11/2/2020 9:29 am COMPARISON: November 1, 2020; October 21, 2015 HISTORY: ORDERING SYSTEM PROVIDED HISTORY: compare to previous, sob TECHNOLOGIST PROVIDED HISTORY: Reason for exam:->compare to previous, sob What reading provider will be dictating this exam?->CRC FINDINGS: Heart size is unable to be accurately assessed on this single portable view of the chest, but appears to be stable. There are new hazy and patchy airspace opacities throughout the right lung, predominantly affecting the mid and upper lung zones. Difficult to exclude small right pleural effusion. The left lung is clear. Bones are diffusely osteopenic. 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 This is unchanged dating back to 2015. New airspace opacities throughout the right lung suggestive of aspiration/pneumonia. Asymmetric pulmonary edema could have a similar appearance.      Xr Chest Portable    Result Date: 11/1/2020  EXAMINATION: ONE XRAY VIEW OF THE CHEST 11/1/2020 5:07 am COMPARISON: 10/21/2015 HISTORY: ORDERING SYSTEM PROVIDED HISTORY: chest pain TECHNOLOGIST PROVIDED HISTORY: Reason for exam:->chest pain What reading provider will be dictating this exam?->CRC FINDINGS: There are bilateral infiltrates with predominantly peripheral distribution. There is no pleural effusion or pneumothorax seen. There is no cardiomegaly or pulmonary vascular congestion. There are old right-sided rib fractures. 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 Bilateral infiltrates. Assessment:   1. COVID 19 pneumonia   2. Respiratory failure ( oxygen saturation 93 % on 8 L of high flow oxygen )   3. Leukocytosis, elevated procalcitonin, hyperferritinemia   4. Hyperglycemia  5. Dementia  6. Lumbar and cervical disc disease            Plan:   1. ID following on Unasyn  2. Continue decadron 2 mg daily   3. Titrate oxygen if able  4. NPO aspiration assessment noted, PEG tube requested now with Core tract  5. Remdesivir to be finished 11/5   6. HOB elevated at all time  7.  STart TF with bowel prep        Raysa Noe, MPH, Delia Courtney  Professor of Medicine  Pulmonary, Critical Care and Sleep Medicine

## 2020-11-08 LAB
ALBUMIN SERPL-MCNC: 2.3 G/DL (ref 3.5–5.2)
ALP BLD-CCNC: 51 U/L (ref 35–104)
ALT SERPL-CCNC: 9 U/L (ref 0–32)
ANION GAP SERPL CALCULATED.3IONS-SCNC: 9 MMOL/L (ref 7–16)
ANISOCYTOSIS: ABNORMAL
APTT: 63.1 SEC (ref 24.5–35.1)
AST SERPL-CCNC: 15 U/L (ref 0–31)
BASOPHILS ABSOLUTE: 0 E9/L (ref 0–0.2)
BASOPHILS RELATIVE PERCENT: 0.2 % (ref 0–2)
BILIRUB SERPL-MCNC: 0.5 MG/DL (ref 0–1.2)
BUN BLDV-MCNC: 21 MG/DL (ref 8–23)
BURR CELLS: ABNORMAL
CALCIUM SERPL-MCNC: 8.1 MG/DL (ref 8.6–10.2)
CHLORIDE BLD-SCNC: 103 MMOL/L (ref 98–107)
CO2: 24 MMOL/L (ref 22–29)
CREAT SERPL-MCNC: 0.4 MG/DL (ref 0.5–1)
EOSINOPHILS ABSOLUTE: 0 E9/L (ref 0.05–0.5)
EOSINOPHILS RELATIVE PERCENT: 0.5 % (ref 0–6)
FIBRINOGEN: 387 MG/DL (ref 225–540)
GFR AFRICAN AMERICAN: >60
GFR NON-AFRICAN AMERICAN: >60 ML/MIN/1.73
GLUCOSE BLD-MCNC: 150 MG/DL (ref 74–99)
HCT VFR BLD CALC: 36.5 % (ref 34–48)
HEMOGLOBIN: 11.9 G/DL (ref 11.5–15.5)
INR BLD: 1.3
LYMPHOCYTES ABSOLUTE: 0.39 E9/L (ref 1.5–4)
LYMPHOCYTES RELATIVE PERCENT: 2.6 % (ref 20–42)
MCH RBC QN AUTO: 26.4 PG (ref 26–35)
MCHC RBC AUTO-ENTMCNC: 32.6 % (ref 32–34.5)
MCV RBC AUTO: 81.1 FL (ref 80–99.9)
METAMYELOCYTES RELATIVE PERCENT: 0.9 % (ref 0–1)
METER GLUCOSE: 123 MG/DL (ref 74–99)
METER GLUCOSE: 130 MG/DL (ref 74–99)
METER GLUCOSE: 139 MG/DL (ref 74–99)
METER GLUCOSE: 157 MG/DL (ref 74–99)
MONOCYTES ABSOLUTE: 0.79 E9/L (ref 0.1–0.95)
MONOCYTES RELATIVE PERCENT: 6.1 % (ref 2–12)
NEUTROPHILS ABSOLUTE: 11.92 E9/L (ref 1.8–7.3)
NEUTROPHILS RELATIVE PERCENT: 89.6 % (ref 43–80)
PDW BLD-RTO: 14.6 FL (ref 11.5–15)
PLATELET # BLD: 401 E9/L (ref 130–450)
PMV BLD AUTO: 10.6 FL (ref 7–12)
POIKILOCYTES: ABNORMAL
POTASSIUM SERPL-SCNC: 3.4 MMOL/L (ref 3.5–5)
PROMYELOCYTES PERCENT: 0.9 % (ref 0–0)
PROTHROMBIN TIME: 14.4 SEC (ref 9.3–12.4)
RBC # BLD: 4.5 E12/L (ref 3.5–5.5)
SODIUM BLD-SCNC: 136 MMOL/L (ref 132–146)
TOTAL PROTEIN: 4.9 G/DL (ref 6.4–8.3)
WBC # BLD: 13.1 E9/L (ref 4.5–11.5)

## 2020-11-08 PROCEDURE — 97530 THERAPEUTIC ACTIVITIES: CPT

## 2020-11-08 PROCEDURE — 6370000000 HC RX 637 (ALT 250 FOR IP): Performed by: INTERNAL MEDICINE

## 2020-11-08 PROCEDURE — 2580000003 HC RX 258: Performed by: INTERNAL MEDICINE

## 2020-11-08 PROCEDURE — 82962 GLUCOSE BLOOD TEST: CPT

## 2020-11-08 PROCEDURE — 85025 COMPLETE CBC W/AUTO DIFF WBC: CPT

## 2020-11-08 PROCEDURE — 85610 PROTHROMBIN TIME: CPT

## 2020-11-08 PROCEDURE — 6360000002 HC RX W HCPCS: Performed by: INTERNAL MEDICINE

## 2020-11-08 PROCEDURE — 2580000003 HC RX 258

## 2020-11-08 PROCEDURE — 36415 COLL VENOUS BLD VENIPUNCTURE: CPT

## 2020-11-08 PROCEDURE — 2140000000 HC CCU INTERMEDIATE R&B

## 2020-11-08 PROCEDURE — 85730 THROMBOPLASTIN TIME PARTIAL: CPT

## 2020-11-08 PROCEDURE — 85384 FIBRINOGEN ACTIVITY: CPT

## 2020-11-08 PROCEDURE — 2700000000 HC OXYGEN THERAPY PER DAY

## 2020-11-08 PROCEDURE — 80053 COMPREHEN METABOLIC PANEL: CPT

## 2020-11-08 PROCEDURE — 97535 SELF CARE MNGMENT TRAINING: CPT

## 2020-11-08 RX ORDER — SODIUM CHLORIDE 0.9 % (FLUSH) 0.9 %
SYRINGE (ML) INJECTION
Status: COMPLETED
Start: 2020-11-08 | End: 2020-11-08

## 2020-11-08 RX ADMIN — DIVALPROEX SODIUM 125 MG: 125 CAPSULE, COATED PELLETS ORAL at 22:47

## 2020-11-08 RX ADMIN — QUETIAPINE FUMARATE 25 MG: 25 TABLET ORAL at 20:23

## 2020-11-08 RX ADMIN — DONEPEZIL HYDROCHLORIDE 5 MG: 5 TABLET, FILM COATED ORAL at 20:14

## 2020-11-08 RX ADMIN — ANTI-FUNGAL POWDER MICONAZOLE NITRATE TALC FREE: 1.42 POWDER TOPICAL at 20:14

## 2020-11-08 RX ADMIN — SODIUM CHLORIDE 3 G: 900 INJECTION INTRAVENOUS at 20:14

## 2020-11-08 RX ADMIN — GUAIFENESIN AND DEXTROMETHORPHAN 5 ML: 100; 10 SYRUP ORAL at 08:51

## 2020-11-08 RX ADMIN — INSULIN LISPRO 2 UNITS: 100 INJECTION, SOLUTION INTRAVENOUS; SUBCUTANEOUS at 05:57

## 2020-11-08 RX ADMIN — DONEPEZIL HYDROCHLORIDE 5 MG: 5 TABLET, FILM COATED ORAL at 08:51

## 2020-11-08 RX ADMIN — DEXTROSE MONOHYDRATE: 50 INJECTION, SOLUTION INTRAVENOUS at 09:27

## 2020-11-08 RX ADMIN — SODIUM CHLORIDE 3 G: 900 INJECTION INTRAVENOUS at 08:52

## 2020-11-08 RX ADMIN — QUETIAPINE FUMARATE 25 MG: 25 TABLET ORAL at 08:52

## 2020-11-08 RX ADMIN — HEPARIN SODIUM 5000 UNITS: 10000 INJECTION INTRAVENOUS; SUBCUTANEOUS at 13:35

## 2020-11-08 RX ADMIN — DIVALPROEX SODIUM 125 MG: 125 CAPSULE, COATED PELLETS ORAL at 08:51

## 2020-11-08 RX ADMIN — PETROLATUM: 42 OINTMENT TOPICAL at 20:14

## 2020-11-08 RX ADMIN — MEMANTINE HYDROCHLORIDE 10 MG: 10 TABLET, FILM COATED ORAL at 20:14

## 2020-11-08 RX ADMIN — SODIUM CHLORIDE 3 G: 900 INJECTION INTRAVENOUS at 03:09

## 2020-11-08 RX ADMIN — SODIUM CHLORIDE 3 G: 900 INJECTION INTRAVENOUS at 14:54

## 2020-11-08 RX ADMIN — LATANOPROST 1 DROP: 50 SOLUTION OPHTHALMIC at 20:23

## 2020-11-08 RX ADMIN — MEMANTINE HYDROCHLORIDE 10 MG: 10 TABLET, FILM COATED ORAL at 08:51

## 2020-11-08 RX ADMIN — HEPARIN SODIUM 5000 UNITS: 10000 INJECTION INTRAVENOUS; SUBCUTANEOUS at 05:57

## 2020-11-08 RX ADMIN — SODIUM CHLORIDE, PRESERVATIVE FREE 10 ML: 5 INJECTION INTRAVENOUS at 09:28

## 2020-11-08 RX ADMIN — POTASSIUM BICARBONATE 40 MEQ: 782 TABLET, EFFERVESCENT ORAL at 10:27

## 2020-11-08 RX ADMIN — HEPARIN SODIUM 5000 UNITS: 10000 INJECTION INTRAVENOUS; SUBCUTANEOUS at 20:29

## 2020-11-08 RX ADMIN — PETROLATUM: 42 OINTMENT TOPICAL at 08:52

## 2020-11-08 RX ADMIN — DEXAMETHASONE SODIUM PHOSPHATE 2 MG: 4 INJECTION, SOLUTION INTRAMUSCULAR; INTRAVENOUS at 09:28

## 2020-11-08 RX ADMIN — ANTI-FUNGAL POWDER MICONAZOLE NITRATE TALC FREE: 1.42 POWDER TOPICAL at 08:52

## 2020-11-08 ASSESSMENT — PAIN SCALES - GENERAL
PAINLEVEL_OUTOF10: 0

## 2020-11-08 NOTE — PLAN OF CARE
Problem: Skin Integrity:  Goal: Will show no infection signs and symptoms  Description: Will show no infection signs and symptoms  Outcome: Met This Shift  Goal: Absence of new skin breakdown  Description: Absence of new skin breakdown  Outcome: Met This Shift     Problem: Falls - Risk of:  Goal: Will remain free from falls  Description: Will remain free from falls  Outcome: Met This Shift  Goal: Absence of physical injury  Description: Absence of physical injury  Outcome: Met This Shift     Problem: Confusion - Acute:  Goal: Absence of continued neurological deterioration signs and symptoms  Description: Absence of continued neurological deterioration signs and symptoms  Outcome: Met This Shift     Problem: Injury - Risk of, Physical Injury:  Goal: Will remain free from falls  Description: Will remain free from falls  Outcome: Met This Shift  Goal: Absence of physical injury  Description: Absence of physical injury  Outcome: Met This Shift     Problem: Mood - Altered:  Goal: Mood stable  Description: Mood stable  Outcome: Met This Shift  Goal: Absence of abusive behavior  Description: Absence of abusive behavior  Outcome: Met This Shift     Problem: Sleep Pattern Disturbance:  Goal: Appears well-rested  Description: Appears well-rested  Outcome: Met This Shift

## 2020-11-08 NOTE — PROGRESS NOTES
Physical Therapy  Treatment Note    Name: Remy Tolentino  : 1925  MRN: [id number]    Referring Provider: Chin Cross DO    Date of Service: 2020    Evaluating PT: Karan Velarde, PT, DPT, [id number]    Room #: 2705/0587-S  Diagnosis: COVID-19  PMHx/PSHx: RA, cellulitis, HTN, anemia, glaucoma, OA  Precautions: Fall risk, Droplet Plus Isolation (COVID-19), 15 L O2/min via NRB, continuous pulse ox, TAPS, miller, alarm    SUBJECTIVE:    Pt is poor historian and unable to provide social hx/PLOF. Per chart, pt is from an ECF. OBJECTIVE:   Initial Evaluation  Date: 11/3/20 Treatment Date:  Date: 2020 Short Term/ Long Term   Goals   AM-PAC 6 Clicks 3/11 8/62    Was pt agreeable to Eval/treatment? Yes yes    Does pt have pain? No current complaints/indications of pain none    Bed Mobility  Rolling: Max A  Supine to sit: Dependent  Sit to supine: Dependent  Scooting: Dependent to HOB Rolling: maxA Rolling: Mod A  Supine to sit: Mod A  Sit to supine: Mod A  Scooting: Mod A   Transfers Sit to stand: NT  Stand to sit: NT  Stand pivot: NT NT Sit to stand: Mod A  Stand to sit: Mod A  Stand pivot: Mod A with AAD   Ambulation   NT NT Sidestep 3 feet with AAD with Mod A   Stair negotiation: ascended and descended NT NT NA   ROM BUE: Refer to OT note  BLE: WFL     Strength BUE: Refer to OT note  BLE: NT     Balance Sitting EOB: Max A  Dynamic Standing: NT NT Sitting EOB: Min A  Dynamic Standing: Mod A with AAD     Pt is A & O x 1  Sensation:  Pt denies numbness and tingling to extremities  Edema:  unremarkable    Vitals:  Vitals monitored throughout session. Additional time was taken to wean to less supplemental O2 as tolerated. Resting 13L NRB 97%  With activity 13L 98%  Weaned to 12L 99%  Weaned to 11L 99%    Patient education  Pt educated on role of PT intervention.       Patient response to education:   Pt verbalized understanding Pt demonstrated skill Pt requires further education in this area   yes yes yes     ASSESSMENT:    Comments:  Additional time was taken for donning/doffing of all necessary PPE for droplet plus isolation for COVID-19. RN cleared pt for activity prior to session. Pt received supine in bed and agreeable to PT intervention with OT collaboration at this time. Pt performed all functional mobility as noted above. Pt found soiled having been incontinent of bowel. Pt required 2 person assistance to ensure that hygiene care was performed adequately and linen could be changed. Pt was then skillfully repositioned in bed for comfort and improved cardiorespiratory function. Pt left with all needs met and call light in reach. Treatment:  Patient practiced and was instructed in the following treatment:     Therapeutic Activities Completed:  o Functional mobility as noted above:   - Bed mobility: maxA to roll. Multiple reps performed during hygiene care. Skilled repositioning in supine with HOB elevated to >45 degrees for pressure relief and comfort. Pt was positioned to allow for improved cardiorespiratory function as well.   o Pt education as noted above.  o Skilled vitals monitoring as noted above.  o Pt hygiene care. PLAN:    Patient is making fair progress towards established goals. Will continue with current POC.       Time in  1125  Time out  1155    Total Treatment Time  23 minutes     CPT codes:  [] Gait training 56435 0 minutes  [] Manual therapy 59205 0 minutes  [x] Therapeutic activities 95308 23 minutes  [] Therapeutic exercises 08766 0 minutes  [] Neuromuscular reeducation 81089 0 minutes    Donato Sears PT, DPT  [id number]

## 2020-11-08 NOTE — PROGRESS NOTES
Department of Internal Medicine  Infectious Diseases  Progress  Note      C/C :  COVID 19 pneumonia     Pt is awake , reports feeling ok, no abdominal pain, no diarrhea  Afebrile   Continues to have high supplemental oxygen requirements      Current Facility-Administered Medications   Medication Dose Route Frequency Provider Last Rate Last Dose    albuterol-ipratropium (COMBIVENT RESPIMAT)  MCG/ACT inhaler 1 puff  1 puff Inhalation Q6H Ziggy Emanuel, DO   1 puff at 11/08/20 0852    dexamethasone (DECADRON) injection 2 mg  2 mg Intravenous Daily Kirby Nichols, DO   2 mg at 11/08/20 4002    QUEtiapine (SEROQUEL) tablet 25 mg  25 mg Oral BID Agatha Sullivan, DO   25 mg at 11/08/20 5634    memantine (NAMENDA) tablet 10 mg  10 mg Oral BID Agatha Sullivan, DO   10 mg at 11/08/20 0504    donepezil (ARICEPT) tablet 5 mg  5 mg Oral BID Agatha Sullivan, DO   5 mg at 11/08/20 7817    divalproex (DEPAKOTE SPRINKLE) capsule 125 mg  125 mg Oral BID Agatha Sullivan, DO   125 mg at 11/08/20 5030    mineral oil-hydrophilic petrolatum (HYDROPHOR) ointment   Topical BID Bhavik Smart DO        And    mineral oil-hydrophilic petrolatum (HYDROPHOR) ointment   Topical TID PRN Agatha Sullivan, DO        ampicillin-sulbactam (UNASYN) 3 g ivpb minibag  3 g Intravenous Q6H Isabel Huitron DO   Stopped at 11/08/20 0955    insulin lispro (HUMALOG) injection vial 0-10 Units  0-10 Units Subcutaneous 4x Daily AC & HS Bhavik Smart, DO   2 Units at 11/08/20 0557    glucose (GLUTOSE) 40 % oral gel 15 g  15 g Oral PRN Bhavik Smart, DO        dextrose 50 % IV solution  12.5 g Intravenous PRN Agatha Sullivan DO        glucagon (rDNA) injection 1 mg  1 mg Intramuscular PRN Agatha Sullivan, DO        dextrose 5 % solution  100 mL/hr Intravenous PRN Bhavik Smart, DO        dextrose 5 % solution   Intravenous Continuous Agatha Sullivan DO 75 mL/hr at 11/08/20 8229      miconazole (MICOTIN) 2 % powder   Topical BID [facility] BONIFACIO Huitron, DO        heparin (porcine) injection 5,000 Units  5,000 Units Subcutaneous 3 times per day Corene Figures, DO   5,000 Units at 11/08/20 0557    acetaminophen (TYLENOL) tablet 650 mg  650 mg Oral Q6H PRN Corene Figures, DO        Or    acetaminophen (TYLENOL) suppository 650 mg  650 mg Rectal Q6H PRN Corene Figures, DO        guaiFENesin-dextromethorphan (ROBITUSSIN DM) 100-10 MG/5ML syrup 5 mL  5 mL Oral Q4H PRN Tahir Huitron, DO   5 mL at 11/08/20 0851    0.9 % sodium chloride bolus  30 mL Intravenous PRN Eric Evans MD        latanoprost (XALATAN) 0.005 % ophthalmic solution 1 drop  1 drop Both Eyes Nightly [facility] BONIFACIO Huitron, DO   1 drop at 11/07/20 2127    bisacodyl (DULCOLAX) suppository 10 mg  10 mg Rectal PRN Corene Figures, DO             REVIEW OF SYSTEMS:  Could not be obtained       PHYSICAL EXAM:      Vitals:     Vitals:    11/08/20 0815   BP: (!) 142/84   Pulse: 96   Resp: 22   Temp: 96.1 °F (35.6 °C)   SpO2: 95%       General Appearance:    Awake, non communicating . Head:    Normocephalic, atraumatic   Eyes:    No pallor, no icterus,   Ears:    No obvious deformity or drainage.    Nose:   No nasal drainage   Throat:  Dry oral mucosa    Neck:   Supple, no lymphadenopathy   Lungs:     Diminished breath sound    Heart:     Regular     Abdomen:     Soft, non-tender, bowel sounds present    Extremities:   No edema, no cyanosis   Pulses:   Dorsalis pedis palpable    Skin:   no rashes or lesions        Lab Results   Component Value Date    WBC 13.1 11/08/2020    RBC 4.50 11/08/2020    HGB 11.9 11/08/2020    HCT 36.5 11/08/2020     11/08/2020    MCV 81.1 11/08/2020    MCH 26.4 11/08/2020    MCHC 32.6 11/08/2020    RDW 14.6 11/08/2020    SEGSPCT 58 05/06/2013    METASPCT 0.9 11/08/2020    LYMPHOPCT 2.6 11/08/2020    PROMYELOPCT 0.9 11/08/2020    MONOPCT 6.1 11/08/2020    MYELOPCT 0.9 11/05/2020    BASOPCT 0.2

## 2020-11-08 NOTE — PROGRESS NOTES
Hospitalist Progress Note      PCP: Navi Dent MD    Date of Admission: 11/1/2020    Chief Complaint: *SOB     Hospital Course: ** found to be covid pos, on unasyn for aspiration, received remdesevir and decadron ** she is aspirating and spoke with the family and they are requesting a PEG, surgery was consulted** not a candidate for PEG due to oxygen needs, has a core pack, and psych meds restarted.  started tube feeds   **       Subjective: *states she cold       Medications:  Reviewed    Infusion Medications    dextrose      dextrose 75 mL/hr at 11/08/20 0927     Scheduled Medications    albuterol-ipratropium  1 puff Inhalation Q6H    dexamethasone  2 mg Intravenous Daily    QUEtiapine  25 mg Oral BID    memantine  10 mg Oral BID    donepezil  5 mg Oral BID    divalproex  125 mg Oral BID    mineral oil-hydrophilic petrolatum   Topical BID    ampicillin-sulbactam  3 g Intravenous Q6H    insulin lispro  0-10 Units Subcutaneous 4x Daily AC & HS    miconazole   Topical BID    heparin (porcine)  5,000 Units Subcutaneous 3 times per day    latanoprost  1 drop Both Eyes Nightly     PRN Meds: mineral oil-hydrophilic petrolatum **AND** mineral oil-hydrophilic petrolatum, glucose, dextrose, glucagon (rDNA), dextrose, acetaminophen **OR** acetaminophen, guaiFENesin-dextromethorphan, sodium chloride, bisacodyl      Intake/Output Summary (Last 24 hours) at 11/8/2020 1428  Last data filed at 11/8/2020 1341  Gross per 24 hour   Intake 2907.1 ml   Output 1500 ml   Net 1407.1 ml       Exam:    BP (!) 142/84   Pulse 96   Temp 96.1 °F (35.6 °C) (Temporal)   Resp 22   Ht 5' 4\" (1.626 m)   Wt 115 lb (52.2 kg)   SpO2 95%   BMI 19.74 kg/m²          Gen: *well developed  HEENT:  moist mucous membranes,  Neck: supple, trachea midline,   Heart:  HRRR on monitor    Abd:  soft, nontender, nondistended, no masses  Extrem:  No clubbing, cyanosis,  **no edema  Skin: no rashes or lesions  Psych: Not oriented to date, Not oriented to person or Not oriented to place   Capillary Refill: Brisk,< 3 seconds   Peripheral Pulses: +2 palpable, equal bilaterally                Labs:   Recent Labs     11/06/20  0513 11/08/20  0604   WBC 10.4 13.1*   HGB 12.0 11.9   HCT 37.0 36.5    401     Recent Labs     11/06/20  0513 11/08/20  0604    136   K 3.9 3.4*   * 103   CO2 21* 24   BUN 23 21   CREATININE 0.4* 0.4*   CALCIUM 8.2* 8.1*     Recent Labs     11/06/20  0513 11/08/20  0604   AST 23 15   ALT 12 9   BILITOT 0.4 0.5   ALKPHOS 50 51     Recent Labs     11/06/20  0513 11/08/20  0604   INR 1.3 1.3     No results for input(s): Marnie Lager in the last 72 hours. Recent Labs     11/06/20  0513 11/08/20  0604   AST 23 15   ALT 12 9   BILITOT 0.4 0.5   ALKPHOS 50 51     No results for input(s): LACTA in the last 72 hours.   Lab Results   Component Value Date    URICACID 3.8 12/11/2011     No results found for: AMMONIA    Assessment:    Active Hospital Problems    Diagnosis Date Noted    Prediabetes [R73.03] 11/04/2020    COVID-19 [U07.1] 11/01/2020     aspiration of pneumonia  Hypernatremia  Hypokalemia  Glaucoma   Aspiration     Plan:  Cont unasyn   cont decadron   SSI  Remdesivir(11/2-11/5)   for core pack     DVT Prophylaxis:  heparin  Diet:  tube feed, glucose   Code Status: Limited     PT/OT Eval Status: **ordered     Dispo - SNF      Electronically signed by Yue James DO on 11/8/2020 at 2:28 PM Tawana Desai

## 2020-11-08 NOTE — PROGRESS NOTES
Maximal Assist    Bed Mobility  Rolling:  Dependent  Supine to sit: Dependent   Sit to supine: Dependent   Rolling: Max A x2    Supine<>Sit: NT d/t fatigue/safety Rolling: mod A  Supine to sit: Maximal Assist   Sit to supine: Maximal Assist    Functional Transfers NT   NT Maximal Assist    Functional Mobility NT   NT     Balance Sitting:     Static:  Dep    + retropulsion   NT      Activity Tolerance P  P+ F   Visual/  Perceptual Continue to assess                          Vitals:  13L NRB  Throughout session, pt was 94-99% on 13L NRB  At end of session, pt was on 11L NRB and 99%. RN aware.     Hand dominance: right       Strength ROM Additional Info:    RUE   3+/5 Grossly wfl good  and poor+ FMC/dexterity noted during ADL tasks      LUE Resistive  Resistive with ROM good  and poor+ FMC/dexterity noted during ADL tasks   Unable to assess B UE strength / ROM due to cognition. Good B UE grasp, resistive toward other movements / commands     Hearing: grossly wfl  Sensation: continue to assess  Tone: wfl  Edema: none noted    Treatment: Upon arrival pt lying in bed and agreeable to OT session at this time. Therapist facilitated bed mobility(educated pt on hand placement to assist in rolling task)--skilled cuing on hand placement, posture, body mechanics and safety. Therapist facilitated self-care retraining: UB/LB self-care tasks(gown, simulated bathing task), toileting task(pt incontinent of BM; assistance for hygiene/clothing management--extensive clean-up required) and grooming task (wash hands) while educating pt on modified techniques, posture, safety and energy conservation techniques. Performed BUE ROM in all planes to prevent contractures and promote joint integrity. Pt able to tolerate AAROM BUE; required cueing throughout for initiation/sequencing of task. Skilled monitoring of HR, O2 sats and pts response to treatment.  At end of session, lying in bed with HOB elevated with all lines and tubes

## 2020-11-09 LAB
ALBUMIN SERPL-MCNC: 2.2 G/DL (ref 3.5–5.2)
ALP BLD-CCNC: 48 U/L (ref 35–104)
ALT SERPL-CCNC: 7 U/L (ref 0–32)
ANION GAP SERPL CALCULATED.3IONS-SCNC: 8 MMOL/L (ref 7–16)
ANISOCYTOSIS: ABNORMAL
APTT: 69.5 SEC (ref 24.5–35.1)
AST SERPL-CCNC: 14 U/L (ref 0–31)
BASOPHILS ABSOLUTE: 0.13 E9/L (ref 0–0.2)
BASOPHILS RELATIVE PERCENT: 0.9 % (ref 0–2)
BILIRUB SERPL-MCNC: 0.4 MG/DL (ref 0–1.2)
BUN BLDV-MCNC: 21 MG/DL (ref 8–23)
BURR CELLS: ABNORMAL
CALCIUM SERPL-MCNC: 7.9 MG/DL (ref 8.6–10.2)
CHLORIDE BLD-SCNC: 102 MMOL/L (ref 98–107)
CO2: 25 MMOL/L (ref 22–29)
CREAT SERPL-MCNC: 0.4 MG/DL (ref 0.5–1)
EOSINOPHILS ABSOLUTE: 0 E9/L (ref 0.05–0.5)
EOSINOPHILS RELATIVE PERCENT: 0.4 % (ref 0–6)
FIBRINOGEN: 443 MG/DL (ref 225–540)
GFR AFRICAN AMERICAN: >60
GFR NON-AFRICAN AMERICAN: >60 ML/MIN/1.73
GLUCOSE BLD-MCNC: 135 MG/DL (ref 74–99)
HCT VFR BLD CALC: 35.1 % (ref 34–48)
HEMOGLOBIN: 11.6 G/DL (ref 11.5–15.5)
HYPOCHROMIA: ABNORMAL
INR BLD: 1.1
LYMPHOCYTES ABSOLUTE: 0.59 E9/L (ref 1.5–4)
LYMPHOCYTES RELATIVE PERCENT: 4.3 % (ref 20–42)
MCH RBC QN AUTO: 26.7 PG (ref 26–35)
MCHC RBC AUTO-ENTMCNC: 33 % (ref 32–34.5)
MCV RBC AUTO: 80.7 FL (ref 80–99.9)
METAMYELOCYTES RELATIVE PERCENT: 3.5 % (ref 0–1)
METER GLUCOSE: 101 MG/DL (ref 74–99)
METER GLUCOSE: 151 MG/DL (ref 74–99)
METER GLUCOSE: 159 MG/DL (ref 74–99)
METER GLUCOSE: 167 MG/DL (ref 74–99)
MONOCYTES ABSOLUTE: 1.03 E9/L (ref 0.1–0.95)
MONOCYTES RELATIVE PERCENT: 7 % (ref 2–12)
MYELOCYTE PERCENT: 1.7 % (ref 0–0)
NEUTROPHILS ABSOLUTE: 12.94 E9/L (ref 1.8–7.3)
NEUTROPHILS RELATIVE PERCENT: 82.6 % (ref 43–80)
OVALOCYTES: ABNORMAL
PDW BLD-RTO: 14.8 FL (ref 11.5–15)
PLATELET # BLD: 394 E9/L (ref 130–450)
PMV BLD AUTO: 10.6 FL (ref 7–12)
POIKILOCYTES: ABNORMAL
POTASSIUM SERPL-SCNC: 4 MMOL/L (ref 3.5–5)
PROTHROMBIN TIME: 12.6 SEC (ref 9.3–12.4)
RBC # BLD: 4.35 E12/L (ref 3.5–5.5)
SODIUM BLD-SCNC: 135 MMOL/L (ref 132–146)
TOTAL PROTEIN: 4.8 G/DL (ref 6.4–8.3)
WBC # BLD: 14.7 E9/L (ref 4.5–11.5)

## 2020-11-09 PROCEDURE — 85610 PROTHROMBIN TIME: CPT

## 2020-11-09 PROCEDURE — 6370000000 HC RX 637 (ALT 250 FOR IP): Performed by: INTERNAL MEDICINE

## 2020-11-09 PROCEDURE — 2580000003 HC RX 258: Performed by: INTERNAL MEDICINE

## 2020-11-09 PROCEDURE — 82962 GLUCOSE BLOOD TEST: CPT

## 2020-11-09 PROCEDURE — 85384 FIBRINOGEN ACTIVITY: CPT

## 2020-11-09 PROCEDURE — 36415 COLL VENOUS BLD VENIPUNCTURE: CPT

## 2020-11-09 PROCEDURE — 2580000003 HC RX 258

## 2020-11-09 PROCEDURE — 99232 SBSQ HOSP IP/OBS MODERATE 35: CPT | Performed by: INTERNAL MEDICINE

## 2020-11-09 PROCEDURE — 6360000002 HC RX W HCPCS: Performed by: INTERNAL MEDICINE

## 2020-11-09 PROCEDURE — 85730 THROMBOPLASTIN TIME PARTIAL: CPT

## 2020-11-09 PROCEDURE — 85025 COMPLETE CBC W/AUTO DIFF WBC: CPT

## 2020-11-09 PROCEDURE — 2700000000 HC OXYGEN THERAPY PER DAY

## 2020-11-09 PROCEDURE — 2140000000 HC CCU INTERMEDIATE R&B

## 2020-11-09 PROCEDURE — 80053 COMPREHEN METABOLIC PANEL: CPT

## 2020-11-09 PROCEDURE — 99232 SBSQ HOSP IP/OBS MODERATE 35: CPT | Performed by: SURGERY

## 2020-11-09 RX ORDER — SODIUM CHLORIDE 0.9 % (FLUSH) 0.9 %
SYRINGE (ML) INJECTION
Status: COMPLETED
Start: 2020-11-09 | End: 2020-11-09

## 2020-11-09 RX ORDER — DEXAMETHASONE SODIUM PHOSPHATE 4 MG/ML
1 INJECTION, SOLUTION INTRA-ARTICULAR; INTRALESIONAL; INTRAMUSCULAR; INTRAVENOUS; SOFT TISSUE DAILY
Status: COMPLETED | OUTPATIENT
Start: 2020-11-10 | End: 2020-11-10

## 2020-11-09 RX ADMIN — ANTI-FUNGAL POWDER MICONAZOLE NITRATE TALC FREE 1 APPLICATOR: 1.42 POWDER TOPICAL at 21:16

## 2020-11-09 RX ADMIN — SODIUM CHLORIDE, PRESERVATIVE FREE 10 ML: 5 INJECTION INTRAVENOUS at 09:14

## 2020-11-09 RX ADMIN — PETROLATUM: 42 OINTMENT TOPICAL at 09:16

## 2020-11-09 RX ADMIN — LATANOPROST 1 DROP: 50 SOLUTION OPHTHALMIC at 21:10

## 2020-11-09 RX ADMIN — DEXTROSE MONOHYDRATE: 50 INJECTION, SOLUTION INTRAVENOUS at 21:10

## 2020-11-09 RX ADMIN — DIVALPROEX SODIUM 125 MG: 125 CAPSULE, COATED PELLETS ORAL at 21:07

## 2020-11-09 RX ADMIN — INSULIN LISPRO 2 UNITS: 100 INJECTION, SOLUTION INTRAVENOUS; SUBCUTANEOUS at 21:30

## 2020-11-09 RX ADMIN — QUETIAPINE FUMARATE 25 MG: 25 TABLET ORAL at 21:07

## 2020-11-09 RX ADMIN — ANTI-FUNGAL POWDER MICONAZOLE NITRATE TALC FREE: 1.42 POWDER TOPICAL at 09:16

## 2020-11-09 RX ADMIN — PETROLATUM: 42 OINTMENT TOPICAL at 22:50

## 2020-11-09 RX ADMIN — QUETIAPINE FUMARATE 25 MG: 25 TABLET ORAL at 09:13

## 2020-11-09 RX ADMIN — DIVALPROEX SODIUM 125 MG: 125 CAPSULE, COATED PELLETS ORAL at 09:14

## 2020-11-09 RX ADMIN — HEPARIN SODIUM 5000 UNITS: 10000 INJECTION INTRAVENOUS; SUBCUTANEOUS at 05:43

## 2020-11-09 RX ADMIN — DEXAMETHASONE SODIUM PHOSPHATE 2 MG: 4 INJECTION, SOLUTION INTRAMUSCULAR; INTRAVENOUS at 09:15

## 2020-11-09 RX ADMIN — DONEPEZIL HYDROCHLORIDE 5 MG: 5 TABLET, FILM COATED ORAL at 09:13

## 2020-11-09 RX ADMIN — HEPARIN SODIUM 5000 UNITS: 10000 INJECTION INTRAVENOUS; SUBCUTANEOUS at 22:20

## 2020-11-09 RX ADMIN — SODIUM CHLORIDE 3 G: 900 INJECTION INTRAVENOUS at 03:13

## 2020-11-09 RX ADMIN — HEPARIN SODIUM 5000 UNITS: 10000 INJECTION INTRAVENOUS; SUBCUTANEOUS at 14:24

## 2020-11-09 RX ADMIN — MEMANTINE HYDROCHLORIDE 10 MG: 10 TABLET, FILM COATED ORAL at 21:07

## 2020-11-09 RX ADMIN — MEMANTINE HYDROCHLORIDE 10 MG: 10 TABLET, FILM COATED ORAL at 09:13

## 2020-11-09 RX ADMIN — SODIUM CHLORIDE 3 G: 900 INJECTION INTRAVENOUS at 09:14

## 2020-11-09 RX ADMIN — DONEPEZIL HYDROCHLORIDE 5 MG: 5 TABLET, FILM COATED ORAL at 21:07

## 2020-11-09 ASSESSMENT — PAIN SCALES - GENERAL
PAINLEVEL_OUTOF10: 0

## 2020-11-09 NOTE — PROGRESS NOTES
[facility]  Department of Internal Medicine  Division of Pulmonary, Critical Care and Sleep Medicine  Progress Note    Arlys Duane, DO, King Wells MD, [facility]    Patient: Tariq Woodruff  MRN: [id number]  : 1925    Encounter Time: 2:24 PM     Date of Admission: 2020  4:19 AM    Primary Care Physician: Елена Christensen MD    Reason for Consultation: CoVID 19       SUBJECTIVE:     10 liters  Core pack, and psych meds restarted. To start tube feeds  BS noted  Now negative 735 ml liters    OBJECTIVE:     PHYSICAL EXAM:   VITALS:     Intake/Output Summary (Last 24 hours) at 2020 1424  Last data filed at 2020 2140  Gross per 24 hour   Intake 1239 ml   Output 1450 ml   Net -211 ml        CONSTITUTIONAL:   Alert   SKIN:     Mild skin discoloration  HEENT:     No thrush  NECK:    No bruits, No JVP apprechiated  CV:      AS  murmur, No rubs, No gallops  PULMONARY:   Couse BS,  No Wheezing, No Rales, No Rhonchi      No noted egophony  ABDOMEN:     Soft, non-tender. BS normal. No R/R/G  EXT:    No deformities . No clubbing. trace lower extremity edema, No venous stasis  PULSE:   Appears equal and palpable.   PSYCHIATRIC:  No acute psycosis  MS:    Gross weakness  NEUROLOGIC:   The clinical assessment is non-focal     DATA: IMAGING & TESTING:     LABORATORY TESTS:    CBC:   Lab Results   Component Value Date    WBC 14.7 2020    RBC 4.35 2020    HGB 11.6 2020    HCT 35.1 2020    MCV 80.7 2020    MCH 26.7 2020    MCHC 33.0 2020    RDW 14.8 2020     2020    MPV 10.6 2020     CMP:    Lab Results   Component Value Date     2020    K 4.0 2020    K 3.5 2020     2020    CO2 25 2020    BUN 21 2020    CREATININE 0.4 2020    GFRAA >60 2020    LABGLOM >60 2020    GLUCOSE 135 2020    GLUCOSE 87 04/10/2012 PROVIDED HISTORY: chest pain TECHNOLOGIST PROVIDED HISTORY: Reason for exam:->chest pain What reading provider will be dictating this exam?->CRC FINDINGS: There are bilateral infiltrates with predominantly peripheral distribution. There is no pleural effusion or pneumothorax seen. There is no cardiomegaly or pulmonary vascular congestion. There are old right-sided rib fractures. 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 Bilateral infiltrates. Assessment:   1. COVID 19 pneumonia   2. Respiratory failure ( oxygen saturation 93 % on 8 L of high flow oxygen )   3. Leukocytosis, elevated procalcitonin, hyperferritinemia   4. Hyperglycemia  5. Dementia  6. Lumbar and cervical disc disease            Plan:   1. ID following on Unasyn  2. Continue decadron 1 mg daily   3. Titrate oxygen if able  4. NPO aspiration assessment noted, PEG tube requested now with Core tract  5. Remdesivir to be finished 11/5   6. HOB elevated at all time  7. Start TF with bowel prep  8.  Wean off the oxygen       Cassie Rendon, MPH, Shirley Boo  Professor of Medicine  Pulmonary, Critical Care and Sleep Medicine

## 2020-11-09 NOTE — PROGRESS NOTES
Department of Internal Medicine  Infectious Diseases  Progress  Note      C/C :  COVID 19 pneumonia     Pt is awake , non verbal, non communicating   Afebrile       Current Facility-Administered Medications   Medication Dose Route Frequency Provider Last Rate Last Dose    albuterol-ipratropium (COMBIVENT RESPIMAT)  MCG/ACT inhaler 1 puff  1 puff Inhalation Q6H [place] Dougherty, DO   1 puff at 11/09/20 0915    dexamethasone (DECADRON) injection 2 mg  2 mg Intravenous Daily Fermin Garcia Magdalena, DO   2 mg at 11/09/20 0915    QUEtiapine (SEROQUEL) tablet 25 mg  25 mg Oral BID El Enma, DO   25 mg at 11/09/20 0913    memantine (NAMENDA) tablet 10 mg  10 mg Oral BID El Enma, DO   10 mg at 11/09/20 0913    donepezil (ARICEPT) tablet 5 mg  5 mg Oral BID El Enma, DO   5 mg at 11/09/20 0913    divalproex (DEPAKOTE SPRINKLE) capsule 125 mg  125 mg Oral BID El Enma, DO   125 mg at 11/09/20 4716    mineral oil-hydrophilic petrolatum (HYDROPHOR) ointment   Topical BID Bhavik Mony Gregory, DO        And    mineral oil-hydrophilic petrolatum (HYDROPHOR) ointment   Topical TID PRN El Enma, DO        ampicillin-sulbactam (UNASYN) 3 g ivpb minibag  3 g Intravenous Q6H Jim Huitron,  mL/hr at 11/09/20 0914 3 g at 11/09/20 0914    insulin lispro (HUMALOG) injection vial 0-10 Units  0-10 Units Subcutaneous 4x Daily AC & HS Bhavik Mony Gregory, DO   2 Units at 11/08/20 0557    glucose (GLUTOSE) 40 % oral gel 15 g  15 g Oral PRN Bhavik Mony Gregory, DO        dextrose 50 % IV solution  12.5 g Intravenous PRN El Enma, DO        glucagon (rDNA) injection 1 mg  1 mg Intramuscular PRN El Enma, DO        dextrose 5 % solution  100 mL/hr Intravenous PRN Bhavik Mony Gregory, DO        dextrose 5 % solution   Intravenous Continuous El Enma, DO 75 mL/hr at 11/08/20 0927      miconazole (MICOTIN) 2 % powder   Topical BID [facility] DO Elana        heparin (porcine) injection 5,000 Units  5,000 Units Subcutaneous 3 times per day [facility] DO   5,000 Units at 11/09/20 0543    acetaminophen (TYLENOL) tablet 650 mg  650 mg Oral Q6H PRN Lady Salazar DO        Or    acetaminophen (TYLENOL) suppository 650 mg  650 mg Rectal Q6H PRN [facility]        guaiFENesin-dextromethorphan (ROBITUSSIN DM) 100-10 MG/5ML syrup 5 mL  5 mL Oral Q4H PRN Garcia Huitron DO   5 mL at 11/08/20 0851    0.9 % sodium chloride bolus  30 mL Intravenous PRN Eric Evans MD        latanoprost (XALATAN) 0.005 % ophthalmic solution 1 drop  1 drop Both Eyes Nightly Paige Huitron DO   1 drop at 11/08/20 2023    bisacodyl (DULCOLAX) suppository 10 mg  10 mg Rectal PRN Lady CoastDO             REVIEW OF SYSTEMS:  Could not be obtained       PHYSICAL EXAM:      Vitals:     Vitals:    11/09/20 0851   BP: (!) 156/62   Pulse: 80   Resp: 18   Temp: 97 °F (36.1 °C)   SpO2: 95%       General Appearance:    Awake, non communicating . Head:    Normocephalic, atraumatic   Eyes:    No pallor, no icterus,   Ears:    No obvious deformity or drainage.    Nose:   No nasal drainage   Throat:  Dry oral mucosa    Neck:   Supple, no lymphadenopathy   Lungs:     Diminished breath sound    Heart:     Regular     Abdomen:     Soft, non-tender, bowel sounds present    Extremities:   No edema, no cyanosis   Pulses:   Dorsalis pedis palpable    Skin:   no rashes or lesions        Lab Results   Component Value Date    WBC 14.7 11/09/2020    RBC 4.35 11/09/2020    HGB 11.6 11/09/2020    HCT 35.1 11/09/2020     11/09/2020    MCV 80.7 11/09/2020    MCH 26.7 11/09/2020    MCHC 33.0 11/09/2020    RDW 14.8 11/09/2020    SEGSPCT 58 05/06/2013    METASPCT 3.5 11/09/2020    LYMPHOPCT 4.3 11/09/2020    PROMYELOPCT 0.9 11/08/2020    MONOPCT 7.0 11/09/2020    MYELOPCT 1.7 11/09/2020    BASOPCT 0.9 11/09/2020    MONOSABS 1.03 11/09/2020    LYMPHSABS 0.59

## 2020-11-09 NOTE — PROGRESS NOTES
Hospitalist Progress Note      PCP: Manny Gallegos MD    Date of Admission: 11/1/2020    Chief Complaint: *  SOB     Hospital Course: ** found to be covid pos, on unasyn for aspiration, received remdesevir and decadron ** she is aspirating and spoke with the family and they are requesting a PEG, surgery was consulted** not a candidate for PEG due to oxygen needs, has a core pack, and psych meds restarted.  started tube feeds ** still on 10 liters of O2.  **     Subjective: **resting quietly       Medications:  Reviewed    Infusion Medications    dextrose      dextrose 75 mL/hr at 11/08/20 0927     Scheduled Medications    [START ON 11/10/2020] dexamethasone  1 mg Intravenous Daily    albuterol-ipratropium  1 puff Inhalation Q6H    QUEtiapine  25 mg Oral BID    memantine  10 mg Oral BID    donepezil  5 mg Oral BID    divalproex  125 mg Oral BID    mineral oil-hydrophilic petrolatum   Topical BID    insulin lispro  0-10 Units Subcutaneous 4x Daily AC & HS    miconazole   Topical BID    heparin (porcine)  5,000 Units Subcutaneous 3 times per day    latanoprost  1 drop Both Eyes Nightly     PRN Meds: mineral oil-hydrophilic petrolatum **AND** mineral oil-hydrophilic petrolatum, glucose, dextrose, glucagon (rDNA), dextrose, acetaminophen **OR** acetaminophen, guaiFENesin-dextromethorphan, sodium chloride, bisacodyl      Intake/Output Summary (Last 24 hours) at 11/9/2020 1532  Last data filed at 11/9/2020 4158  Gross per 24 hour   Intake 1239 ml   Output 650 ml   Net 589 ml       Exam:    BP (!) 156/62   Pulse 80   Temp 97 °F (36.1 °C) (Temporal)   Resp 18   Ht 5' 4\" (1.626 m)   Wt 115 lb (52.2 kg)   SpO2 95%   BMI 19.74 kg/m²       Gen: *well developed  HEENT:  moist mucous membranes,  Neck: supple, trachea midline,   Heart:  HRRR   Lungs , diminished bilaterally  Abd:  soft, nontender, nondistended, no masses  Extrem:  No clubbing, cyanosis,  **no edema  Skin: no rashes or lesions  Psych: Not

## 2020-11-09 NOTE — PLAN OF CARE
Problem: Skin Integrity:  Goal: Will show no infection signs and symptoms  Description: Will show no infection signs and symptoms  11/8/2020 2325 by Brielle Ryan RN  Outcome: Met This Shift  11/8/2020 1827 by Phong Olivares RN  Outcome: Met This Shift  Goal: Absence of new skin breakdown  Description: Absence of new skin breakdown  11/8/2020 2325 by Brielle Ryan RN  Outcome: Met This Shift  11/8/2020 1827 by Phong Olivares RN  Outcome: Met This Shift     Problem: Falls - Risk of:  Goal: Will remain free from falls  Description: Will remain free from falls  11/8/2020 2325 by Brielle Ryan RN  Outcome: Met This Shift  11/8/2020 1827 by Phong Olivares RN  Outcome: Met This Shift  Goal: Absence of physical injury  Description: Absence of physical injury  11/8/2020 2325 by Brielle Ryan RN  Outcome: Met This Shift  11/8/2020 1827 by Phong Olivares RN  Outcome: Met This Shift     Problem: Confusion - Acute:  Goal: Absence of continued neurological deterioration signs and symptoms  Description: Absence of continued neurological deterioration signs and symptoms  11/8/2020 2325 by Brielle Ryan RN  Outcome: Met This Shift  11/8/2020 1827 by Phong Olivares RN  Outcome: Met This Shift  Goal: Mental status will be restored to baseline  Description: Mental status will be restored to baseline  Outcome: Met This Shift

## 2020-11-09 NOTE — PROGRESS NOTES
[facility]  ATTENDING PHYSICIAN PROGRESS NOTE     I have examined the patient and  reviewed the record. I have reviewed all relevant labs and imaging data. The following summarizes my clinical findings and independent assessment. CC: PEG tube consult    S. Patient has coven 19 pneumonia. She currently has a CorPak and is tolerating feeds    O.  Vitals:    11/09/20 0851   BP: (!) 156/62   Pulse: 80   Resp: 18   Temp: 97 °F (36.1 °C)   SpO2: 95%     Lethargic  CorPak present  Appears in no distress  (I saw the patient from the doorway)    ASSESSMENT:  Active Problems:    COVID-19    Prediabetes  Resolved Problems:    * No resolved hospital problems. *       PLAN:  Covid 19 pneumonia-per infectious disease  Dysphagia--continue CorPak. Would not recommend PEG tube at this time secondary to high oxygen requirements. Patient is on 10 L nonrebreather. She can have a PEG tube in the future once her respiratory status improves. continue the CorPak for tube feeds  DVT Proph: SCDS/ heparin tid  We will follow as needed       Sudie Bence, MD, FACS  11/9/2020  1:12 PM    NOTE: This report was transcribed using voice recognition software. Every effort was made to ensure accuracy; however, inadvertent computerized transcription errors may be present.

## 2020-11-10 LAB
ALBUMIN SERPL-MCNC: 2.3 G/DL (ref 3.5–5.2)
ALP BLD-CCNC: 47 U/L (ref 35–104)
ALT SERPL-CCNC: 8 U/L (ref 0–32)
ANION GAP SERPL CALCULATED.3IONS-SCNC: 10 MMOL/L (ref 7–16)
ANISOCYTOSIS: ABNORMAL
APTT: 29.2 SEC (ref 24.5–35.1)
AST SERPL-CCNC: 20 U/L (ref 0–31)
BASOPHILS ABSOLUTE: 0.13 E9/L (ref 0–0.2)
BASOPHILS RELATIVE PERCENT: 0.9 % (ref 0–2)
BILIRUB SERPL-MCNC: 0.4 MG/DL (ref 0–1.2)
BUN BLDV-MCNC: 21 MG/DL (ref 8–23)
BURR CELLS: ABNORMAL
CALCIUM SERPL-MCNC: 8.1 MG/DL (ref 8.6–10.2)
CHLORIDE BLD-SCNC: 99 MMOL/L (ref 98–107)
CO2: 24 MMOL/L (ref 22–29)
CREAT SERPL-MCNC: 0.4 MG/DL (ref 0.5–1)
EOSINOPHILS ABSOLUTE: 0 E9/L (ref 0.05–0.5)
EOSINOPHILS RELATIVE PERCENT: 0.1 % (ref 0–6)
FIBRINOGEN: 523 MG/DL (ref 225–540)
GFR AFRICAN AMERICAN: >60
GFR NON-AFRICAN AMERICAN: >60 ML/MIN/1.73
GLUCOSE BLD-MCNC: 118 MG/DL (ref 74–99)
HCT VFR BLD CALC: 36.2 % (ref 34–48)
HEMOGLOBIN: 11.7 G/DL (ref 11.5–15.5)
INR BLD: 1
LYMPHOCYTES ABSOLUTE: 0.29 E9/L (ref 1.5–4)
LYMPHOCYTES RELATIVE PERCENT: 1.7 % (ref 20–42)
MCH RBC QN AUTO: 26.7 PG (ref 26–35)
MCHC RBC AUTO-ENTMCNC: 32.3 % (ref 32–34.5)
MCV RBC AUTO: 82.5 FL (ref 80–99.9)
METAMYELOCYTES RELATIVE PERCENT: 1.7 % (ref 0–1)
METER GLUCOSE: 108 MG/DL (ref 74–99)
METER GLUCOSE: 137 MG/DL (ref 74–99)
MONOCYTES ABSOLUTE: 1.3 E9/L (ref 0.1–0.95)
MONOCYTES RELATIVE PERCENT: 8.7 % (ref 2–12)
MYELOCYTE PERCENT: 5.2 % (ref 0–0)
NEUTROPHILS ABSOLUTE: 12.76 E9/L (ref 1.8–7.3)
NEUTROPHILS RELATIVE PERCENT: 80.9 % (ref 43–80)
PDW BLD-RTO: 15.1 FL (ref 11.5–15)
PLATELET # BLD: 391 E9/L (ref 130–450)
PMV BLD AUTO: 10.7 FL (ref 7–12)
POIKILOCYTES: ABNORMAL
POLYCHROMASIA: ABNORMAL
POTASSIUM SERPL-SCNC: 4.7 MMOL/L (ref 3.5–5)
PROMYELOCYTES PERCENT: 0.9 % (ref 0–0)
PROTHROMBIN TIME: 11.1 SEC (ref 9.3–12.4)
RBC # BLD: 4.39 E12/L (ref 3.5–5.5)
SCHISTOCYTES: ABNORMAL
SODIUM BLD-SCNC: 133 MMOL/L (ref 132–146)
TEAR DROP CELLS: ABNORMAL
TOTAL PROTEIN: 5.2 G/DL (ref 6.4–8.3)
WBC # BLD: 14.5 E9/L (ref 4.5–11.5)

## 2020-11-10 PROCEDURE — 82962 GLUCOSE BLOOD TEST: CPT

## 2020-11-10 PROCEDURE — 36415 COLL VENOUS BLD VENIPUNCTURE: CPT

## 2020-11-10 PROCEDURE — 85384 FIBRINOGEN ACTIVITY: CPT

## 2020-11-10 PROCEDURE — 6370000000 HC RX 637 (ALT 250 FOR IP): Performed by: INTERNAL MEDICINE

## 2020-11-10 PROCEDURE — 85025 COMPLETE CBC W/AUTO DIFF WBC: CPT

## 2020-11-10 PROCEDURE — 2580000003 HC RX 258

## 2020-11-10 PROCEDURE — 2700000000 HC OXYGEN THERAPY PER DAY

## 2020-11-10 PROCEDURE — 2140000000 HC CCU INTERMEDIATE R&B

## 2020-11-10 PROCEDURE — 6360000002 HC RX W HCPCS: Performed by: INTERNAL MEDICINE

## 2020-11-10 PROCEDURE — 80053 COMPREHEN METABOLIC PANEL: CPT

## 2020-11-10 PROCEDURE — 85610 PROTHROMBIN TIME: CPT

## 2020-11-10 PROCEDURE — 85730 THROMBOPLASTIN TIME PARTIAL: CPT

## 2020-11-10 PROCEDURE — 99233 SBSQ HOSP IP/OBS HIGH 50: CPT | Performed by: INTERNAL MEDICINE

## 2020-11-10 RX ORDER — SODIUM CHLORIDE 0.9 % (FLUSH) 0.9 %
SYRINGE (ML) INJECTION
Status: COMPLETED
Start: 2020-11-10 | End: 2020-11-10

## 2020-11-10 RX ADMIN — DIVALPROEX SODIUM 125 MG: 125 CAPSULE, COATED PELLETS ORAL at 20:50

## 2020-11-10 RX ADMIN — PETROLATUM: 42 OINTMENT TOPICAL at 09:59

## 2020-11-10 RX ADMIN — SODIUM CHLORIDE, PRESERVATIVE FREE 10 ML: 5 INJECTION INTRAVENOUS at 09:57

## 2020-11-10 RX ADMIN — PETROLATUM: 42 OINTMENT TOPICAL at 20:51

## 2020-11-10 RX ADMIN — HEPARIN SODIUM 5000 UNITS: 10000 INJECTION INTRAVENOUS; SUBCUTANEOUS at 05:36

## 2020-11-10 RX ADMIN — QUETIAPINE FUMARATE 25 MG: 25 TABLET ORAL at 20:50

## 2020-11-10 RX ADMIN — ANTI-FUNGAL POWDER MICONAZOLE NITRATE TALC FREE: 1.42 POWDER TOPICAL at 20:51

## 2020-11-10 RX ADMIN — LATANOPROST 1 DROP: 50 SOLUTION OPHTHALMIC at 20:51

## 2020-11-10 RX ADMIN — DONEPEZIL HYDROCHLORIDE 5 MG: 5 TABLET, FILM COATED ORAL at 20:50

## 2020-11-10 RX ADMIN — MEMANTINE HYDROCHLORIDE 10 MG: 10 TABLET, FILM COATED ORAL at 09:57

## 2020-11-10 RX ADMIN — DIVALPROEX SODIUM 125 MG: 125 CAPSULE, COATED PELLETS ORAL at 09:57

## 2020-11-10 RX ADMIN — MEMANTINE HYDROCHLORIDE 10 MG: 10 TABLET, FILM COATED ORAL at 20:50

## 2020-11-10 RX ADMIN — DONEPEZIL HYDROCHLORIDE 5 MG: 5 TABLET, FILM COATED ORAL at 09:57

## 2020-11-10 RX ADMIN — ANTI-FUNGAL POWDER MICONAZOLE NITRATE TALC FREE: 1.42 POWDER TOPICAL at 09:59

## 2020-11-10 RX ADMIN — QUETIAPINE FUMARATE 25 MG: 25 TABLET ORAL at 09:57

## 2020-11-10 RX ADMIN — DEXAMETHASONE SODIUM PHOSPHATE 1 MG: 4 INJECTION INTRA-ARTICULAR; INTRALESIONAL; INTRAMUSCULAR; INTRAVENOUS; SOFT TISSUE at 09:57

## 2020-11-10 ASSESSMENT — PAIN SCALES - GENERAL
PAINLEVEL_OUTOF10: 0

## 2020-11-10 NOTE — PROGRESS NOTES
Hospitalist Progress Note      SYNOPSIS: Patient admitted on 2020 History is obtained from EMR  and ER physician due to patient currently nonverbal.  Favio Crockett presented to the ER from [facility], [place], operated by [facility] for evaluation of hypoxia. She was found to be positive for coronavirus at their facility. Upon ER arrival she was found to be 86%. In the ER, she was found to be in renal failure and hyponatremic. Chest x-ray showing bilateral infiltrates. I spoke with her daughter, George Ray, who is the POA and discussed CODE STATUS. George Ray wants her to be a limited code with no intubation, no CPR, no emergency resuscitative meds, no defibrillation. She wishes for her to still get fluids for her renal failure and treatment for coronavirus. SUBJECTIVE:  Stable overnight. No other overnight issues reported. Patient seen and examined  Records reviewed. Patient remains nonverbal  NG tube in place with tube feeds  On 9L NC  Evaluated by surgery for possible PEG but deferred at this time due to respiratory status      Temp (24hrs), Av.8 °F (36.6 °C), Min:97.6 °F (36.4 °C), Max:98 °F (36.7 °C)    DIET: DIET TUBE FEED CONTINUOUS/CYCLIC NPO; Other Tube Feeding (must specify product in comment) (Diabetic @ 40 ml/hr +1 protein modular daily to provide 960 ml tv, 1152 kcal, 58 gm pro, 773 ml free water (1252 kcal, 84 gm pro)); Nasogastric; Continuous. .. CODE: Limited    Intake/Output Summary (Last 24 hours) at 11/10/2020 0959  Last data filed at 11/10/2020 0920  Gross per 24 hour   Intake 1060 ml   Output 1725 ml   Net -665 ml       Review of Systems  Unable to be obtained      OBJECTIVE:    BP (!) 158/78   Pulse 77   Temp 97.6 °F (36.4 °C) (Temporal)   Resp 20   Ht 5' 4\" (1.626 m)   Wt 115 lb (52.2 kg)   SpO2 96%   BMI 19.74 kg/m²     General appearance: Currently nonverbal, NG in place, 9L NC  HEENT:  Conjunctivae/corneas clear. Neck: Supple. No jugular venous distention.    Respiratory: symmetrical; diminished bilaterally; no wheezes; no rhonchi; no rales  Cardiovascular: rhythm regular; rate controlled; no murmurs  Abdomen: Soft, nontender, nondistended  Extremities:  peripheral pulses present; no peripheral edema; no ulcers  Musculoskeletal: No clubbing, cyanosis, no bilateral lower extremity edema. Brisk capillary refill. Skin:  No rashes  on visible skin  Neurologic: awake, alert and following commands     ASSESSMENT and PLAN:  · Acute hypoxic respiratory failure secondary to COVID-19 viral pneumonia-infectious disease following. Decadron daily. Finished remdesivir. · Acute kidney injury, resolved  · Hypernatremia, resolved  · Elevated troponin in the setting of acute kidney injury  · Dysphagia- Patient failed swallow study, currently has CorePak. Family wanted to pursue PEG.  General surgery following, however due to respiratory status this is on hold currently  · Dementia         DISPOSITION: Continue current plan of care    Medications:  REVIEWED DAILY    Infusion Medications    dextrose      dextrose 75 mL/hr at 11/09/20 2110     Scheduled Medications    sodium chloride flush        dexamethasone  1 mg Intravenous Daily    albuterol-ipratropium  1 puff Inhalation Q6H    QUEtiapine  25 mg Oral BID    memantine  10 mg Oral BID    donepezil  5 mg Oral BID    divalproex  125 mg Oral BID    mineral oil-hydrophilic petrolatum   Topical BID    insulin lispro  0-10 Units Subcutaneous 4x Daily AC & HS    miconazole   Topical BID    heparin (porcine)  5,000 Units Subcutaneous 3 times per day    latanoprost  1 drop Both Eyes Nightly     PRN Meds: mineral oil-hydrophilic petrolatum **AND** mineral oil-hydrophilic petrolatum, glucose, dextrose, glucagon (rDNA), dextrose, acetaminophen **OR** acetaminophen, guaiFENesin-dextromethorphan, sodium chloride, bisacodyl    Labs:     Recent Labs     11/08/20  0604 11/09/20  0322 11/10/20  0245   WBC 13.1* 14.7* 14.5*   HGB 11.9 11.6 11.7   HCT 36.5 35.1 36.2   PLT 401 394 391       Recent Labs     11/08/20  0604 11/09/20  0322 11/10/20  0245    135 133   K 3.4* 4.0 4.7    102 99   CO2 24 25 24   BUN 21 21 21   CREATININE 0.4* 0.4* 0.4*   CALCIUM 8.1* 7.9* 8.1*       Recent Labs     11/08/20  0604 11/09/20  0322 11/10/20  0245   PROT 4.9* 4.8* 5.2*   ALKPHOS 51 48 47   ALT 9 7 8   AST 15 14 20   BILITOT 0.5 0.4 0.4       Recent Labs     11/08/20  0604 11/09/20  0322 11/10/20  0245   INR 1.3 1.1 1.0       No results for input(s): Marnie Lager in the last 72 hours. Chronic labs:    Lab Results   Component Value Date    CHOL 149 05/03/2013    TRIG 69 05/03/2013    HDL 56.0 05/03/2013    LDLCALC 79 05/03/2013    TSH 1.230 08/08/2015    INR 1.0 11/10/2020    LABA1C 5.9 (H) 11/03/2020       Radiology: REVIEWED DAILY    +++++++++++++++++++++++++++++++++++++++++++++++++  Michael Fresh DO  [employer] - 2020 [place], [place]  +++++++++++++++++++++++++++++++++++++++++++++++++  NOTE: This report was transcribed using voice recognition software. Every effort was made to ensure accuracy; however, inadvertent computerized transcription errors may be present.

## 2020-11-10 NOTE — PROGRESS NOTES
Diabetic  · Schedule: Continuous  · Additives/Modulars: Protein  · Water Flushes: per nursing  · Current TF & Flush Orders Provides: @40 + plus 1 protein modular provides 960ml 1152 calories, 58g protein, 773ml water (1256 calories and 84g protein w/ protein modular)      Anthropometric Measures:  · Height: 5' 4\" (162.6 cm)  · Current Body Weight: 115 lb (52.2 kg)(11/1/20, no method)   · Admission Body Weight: 115 lb (52.2 kg)(11/1/20, no method)    · Usual Body Weight: (UTO ; no weights available in EMR hx from previous encounters)     · Ideal Body Weight: 120 lbs; % Ideal Body Weight 95.8 %   · BMI: 19.7  · BMI Categories: Normal Weight (BMI 22.0 to 24.9) age over 72       Nutrition Diagnosis:   · Inadequate oral intake related to cognitive or neurological impairment as evidenced by NPO or clear liquid status due to medical condition, nutrition support - enteral nutrition, swallow study results      Nutrition Interventions:   Food and/or Nutrient Delivery:  Continue NPO, Continue Current Tube Feeding  Nutrition Education/Counseling:  Education not indicated   Coordination of Nutrition Care:  Continue to monitor while inpatient, Speech Therapy, Swallow Evaluation    Goals:  Tube feeding will meet nutritional needs with good tolerance       Nutrition Monitoring and Evaluation:   Behavioral-Environmental Outcomes:  Beliefs and Attitutes   Food/Nutrient Intake Outcomes:  Enteral Nutrition Intake/Tolerance  Physical Signs/Symptoms Outcomes:  Biochemical Data, Chewing or Swallowing, GI Status, Fluid Status or Edema, Hemodynamic Status, Nutrition Focused Physical Findings, Skin, Weight     Discharge Planning:    Enteral Nutrition     Electronically signed by Lima Guevara RD, LD on 11/10/20 at 12:25 PM EST    Contact: [phone]

## 2020-11-10 NOTE — PROGRESS NOTES
Department of Internal Medicine  Infectious Diseases  Progress  Note      C/C :  COVID 19 pneumonia     Pt is awake , non verbal, non communicating   Afebrile       Current Facility-Administered Medications   Medication Dose Route Frequency Provider Last Rate Last Dose    albuterol-ipratropium (COMBIVENT RESPIMAT)  MCG/ACT inhaler 1 puff  1 puff Inhalation Q6H Richa Ora, DO   1 puff at 11/10/20 4091    QUEtiapine (SEROQUEL) tablet 25 mg  25 mg Oral BID Arno Foster, DO   25 mg at 11/10/20 0957    memantine (NAMENDA) tablet 10 mg  10 mg Oral BID Arno Foster, DO   10 mg at 11/10/20 0957    donepezil (ARICEPT) tablet 5 mg  5 mg Oral BID Arno Foster, DO   5 mg at 11/10/20 0957    divalproex (DEPAKOTE SPRINKLE) capsule 125 mg  125 mg Oral BID Arno Foster, DO   125 mg at 11/10/20 0957    mineral oil-hydrophilic petrolatum (HYDROPHOR) ointment   Topical BID Bhavik Quinten Peon, DO        And    mineral oil-hydrophilic petrolatum (HYDROPHOR) ointment   Topical TID PRN Arno Foster, DO        insulin lispro (HUMALOG) injection vial 0-10 Units  0-10 Units Subcutaneous 4x Daily AC & HS Bhavik Quinten Peon, DO   2 Units at 11/09/20 2130    glucose (GLUTOSE) 40 % oral gel 15 g  15 g Oral PRN Bhavik Quinten Peon, DO        dextrose 50 % IV solution  12.5 g Intravenous PRN Arno Foster, DO        glucagon (rDNA) injection 1 mg  1 mg Intramuscular PRN Hbavik Quinten Peon, DO        dextrose 5 % solution  100 mL/hr Intravenous PRN Bhavik Quinten Peon, DO        miconazole (MICOTIN) 2 % powder   Topical BID Paige Huitron DO        heparin (porcine) injection 5,000 Units  5,000 Units Subcutaneous 3 times per day Genet Reeks, DO   5,000 Units at 11/10/20 0536    acetaminophen (TYLENOL) tablet 650 mg  650 mg Oral Q6H PRN Genet Reeks, DO        Or    acetaminophen (TYLENOL) suppository 650 mg  650 mg Rectal Q6H PRN Genet Reeks, DO        guaiFENesin-dextromethorphan (ROBITUSSIN DM) 100-10 MG/5ML syrup 5 mL  5 mL Oral Q4H PRN Veverly Ramiro Huitron, DO   5 mL at 11/08/20 0851    0.9 % sodium chloride bolus  30 mL Intravenous PRN Eric Evans MD        latanoprost (XALATAN) 0.005 % ophthalmic solution 1 drop  1 drop Both Eyes Nightly Lashell Saltness M Elana, DO   1 drop at 11/09/20 2110    bisacodyl (DULCOLAX) suppository 10 mg  10 mg Rectal PRN Kusum Vaughn,              REVIEW OF SYSTEMS:  Could not be obtained       PHYSICAL EXAM:      Vitals:     BP (!) 146/70   Pulse 84   Temp 97 °F (36.1 °C) (Temporal)   Resp 25   Ht 5' 4\" (1.626 m)   Wt 115 lb (52.2 kg)   SpO2 94%   BMI 19.74 kg/m²     General Appearance:    Awake, non communicating . Head:    Normocephalic, atraumatic   Eyes:    No pallor, no icterus,   Ears:    No obvious deformity or drainage.    Nose:   No nasal drainage   Throat:  Dry oral mucosa    Neck:   Supple, no lymphadenopathy   Lungs:     Diminished breath sound    Heart:     Regular     Abdomen:     Soft, non-tender, bowel sounds present    Extremities:   No edema, no cyanosis   Pulses:   Dorsalis pedis palpable    Skin:   No rash         Lab Results   Component Value Date    WBC 14.5 11/10/2020    RBC 4.39 11/10/2020    HGB 11.7 11/10/2020    HCT 36.2 11/10/2020     11/10/2020    MCV 82.5 11/10/2020    MCH 26.7 11/10/2020    MCHC 32.3 11/10/2020    RDW 15.1 11/10/2020    SEGSPCT 58 05/06/2013    METASPCT 1.7 11/10/2020    LYMPHOPCT 1.7 11/10/2020    PROMYELOPCT 0.9 11/10/2020    MONOPCT 8.7 11/10/2020    MYELOPCT 5.2 11/10/2020    BASOPCT 0.9 11/10/2020    MONOSABS 1.30 11/10/2020    LYMPHSABS 0.29 11/10/2020    EOSABS 0.00 11/10/2020    BASOSABS 0.13 11/10/2020       CMP     Lab Results   Component Value Date     11/10/2020    K 4.7 11/10/2020    K 3.5 11/03/2020    CL 99 11/10/2020    CO2 24 11/10/2020    BUN 21 11/10/2020    CREATININE 0.4 11/10/2020    GFRAA >60 11/10/2020    LABGLOM >60 11/10/2020

## 2020-11-10 NOTE — PLAN OF CARE
Problem: Skin Integrity:  Goal: Will show no infection signs and symptoms  Description: Will show no infection signs and symptoms  11/9/2020 1942 by Nini Aleman RN  Outcome: Not Met This Shift  11/9/2020 1103 by Mehnaz Bonilla RN  Outcome: Met This Shift  Goal: Absence of new skin breakdown  Description: Absence of new skin breakdown  11/9/2020 1942 by Nini Aleman RN  Outcome: Not Met This Shift  11/9/2020 1103 by Mehnaz Bonilla RN  Outcome: Met This Shift     Problem: Falls - Risk of:  Goal: Will remain free from falls  Description: Will remain free from falls  11/9/2020 1942 by Nini Aleman RN  Outcome: Not Met This Shift  11/9/2020 1103 by Mehnaz Bonilla RN  Outcome: Met This Shift  Goal: Absence of physical injury  Description: Absence of physical injury  11/9/2020 1942 by Nini Aleman RN  Outcome: Not Met This Shift  11/9/2020 1103 by Mehnaz Bonilla RN  Outcome: Met This Shift     Problem: Confusion - Acute:  Goal: Absence of continued neurological deterioration signs and symptoms  Description: Absence of continued neurological deterioration signs and symptoms  11/9/2020 1942 by Nini Aleman RN  Outcome: Not Met This Shift  11/9/2020 1103 by Mehnaz Bonilla RN  Outcome: Met This Shift  Goal: Mental status will be restored to baseline  Description: Mental status will be restored to baseline  11/9/2020 1942 by Nini Aleman RN  Outcome: Not Met This Shift  11/9/2020 1103 by Mehnaz Bonilla RN  Outcome: Met This Shift     Problem: Discharge Planning:  Goal: Ability to perform activities of daily living will improve  Description: Ability to perform activities of daily living will improve  11/9/2020 1942 by Nini Aleman RN  Outcome: Not Met This Shift  11/9/2020 1103 by Mehnaz Bonilla RN  Outcome: Met This Shift  Goal: Participates in care planning  Description: Participates in care planning  11/9/2020 1942 by Nini Aleman RN  Outcome: Not Met This Shift  11/9/2020 1103 by Lord Pema RN  Outcome: Met This Shift     Problem: Injury - Risk of, Physical Injury:  Goal: Will remain free from falls  Description: Will remain free from falls  11/9/2020 1942 by Santana Sullivan RN  Outcome: Not Met This Shift  11/9/2020 1103 by Lord Pema RN  Outcome: Met This Shift  Goal: Absence of physical injury  Description: Absence of physical injury  11/9/2020 1942 by Santana Sullivan RN  Outcome: Not Met This Shift  11/9/2020 1103 by Lord Pema RN  Outcome: Met This Shift     Problem: Mood - Altered:  Goal: Mood stable  Description: Mood stable  11/9/2020 1942 by Santana Sullivan RN  Outcome: Not Met This Shift  11/9/2020 1103 by Lord Pema RN  Outcome: Met This Shift  Goal: Absence of abusive behavior  Description: Absence of abusive behavior  11/9/2020 1942 by Santana Sullivan RN  Outcome: Not Met This Shift  11/9/2020 1103 by Lord Pema RN  Outcome: Met This Shift  Goal: Verbalizations of feeling emotionally comfortable while being cared for will increase  Description: Verbalizations of feeling emotionally comfortable while being cared for will increase  11/9/2020 1942 by Santana Sullivan RN  Outcome: Not Met This Shift  11/9/2020 1103 by Lord Pema RN  Outcome: Met This Shift     Problem: Psychomotor Activity - Altered:  Goal: Absence of psychomotor disturbance signs and symptoms  Description: Absence of psychomotor disturbance signs and symptoms  11/9/2020 1942 by Santana Sullivan RN  Outcome: Not Met This Shift  11/9/2020 1103 by Lord Pema RN  Outcome: Met This Shift     Problem: Sensory Perception - Impaired:  Goal: Demonstrations of improved sensory functioning will increase  Description: Demonstrations of improved sensory functioning will increase  11/9/2020 1942 by Santana Sullivan RN  Outcome: Not Met This Shift  11/9/2020 1103 by Lord Pema RN  Outcome: Met This Shift  Goal: Decrease in sensory misperception frequency  Description: Decrease in sensory misperception frequency  11/9/2020 1942 by Chriss Osborne RN  Outcome: Not Met This Shift  11/9/2020 1103 by Wilbern Favre, RN  Outcome: Met This Shift  Goal: Able to refrain from responding to false sensory perceptions  Description: Able to refrain from responding to false sensory perceptions  11/9/2020 1942 by Chriss Osborne RN  Outcome: Not Met This Shift  11/9/2020 1103 by Wilbern Favre, RN  Outcome: Met This Shift  Goal: Demonstrates accurate environmental perceptions  Description: Demonstrates accurate environmental perceptions  11/9/2020 1942 by Chriss Osborne RN  Outcome: Not Met This Shift  11/9/2020 1103 by Wilbern Favre, RN  Outcome: Met This Shift  Goal: Able to distinguish between reality-based and nonreality-based thinking  Description: Able to distinguish between reality-based and nonreality-based thinking  11/9/2020 1942 by Chriss Osborne RN  Outcome: Not Met This Shift  11/9/2020 1103 by Wilbern Favre, RN  Outcome: Met This Shift  Goal: Able to interrupt nonreality-based thinking  Description: Able to interrupt nonreality-based thinking  11/9/2020 1942 by Chriss Osborne RN  Outcome: Not Met This Shift  11/9/2020 1103 by Wilbern Favre, RN  Outcome: Met This Shift     Problem: Sleep Pattern Disturbance:  Goal: Appears well-rested  Description: Appears well-rested  11/9/2020 1942 by Chriss Osborne RN  Outcome: Not Met This Shift  11/9/2020 1103 by Wilbern Favre, RN  Outcome: Met This Shift     Problem: Pain:  Goal: Pain level will decrease  Description: Pain level will decrease  11/9/2020 1942 by Chriss Osborne RN  Outcome: Not Met This Shift  11/9/2020 1103 by Wilbern Favre, RN  Outcome: Met This Shift  Goal: Control of acute pain  Description: Control of acute pain  11/9/2020 1942 by Chriss Osborne RN  Outcome: Not Met This Shift  11/9/2020 1103 by Wilbern Favre, RN  Outcome: Met This Shift  Goal: Control of chronic pain  Description: Control of chronic pain  11/9/2020 1942 by Holden Hurt RN  Outcome: Not Met This Shift  11/9/2020 1103 by Nathalie Fischer RN  Outcome: Met This Shift     Problem: Airway Clearance - Ineffective  Goal: Achieve or maintain patent airway  11/9/2020 1942 by Holden Hurt RN  Outcome: Not Met This Shift  11/9/2020 1103 by Nathalie Fischer RN  Outcome: Met This Shift     Problem: Gas Exchange - Impaired  Goal: Absence of hypoxia  11/9/2020 1942 by Holden Hurt RN  Outcome: Not Met This Shift  11/9/2020 1103 by Nathalie Fischer RN  Outcome: Met This Shift  Goal: Promote optimal lung function  11/9/2020 1942 by Holden Hurt RN  Outcome: Not Met This Shift  11/9/2020 1103 by Nathalie Fischer RN  Outcome: Met This Shift     Problem: Breathing Pattern - Ineffective  Goal: Ability to achieve and maintain a regular respiratory rate  11/9/2020 1942 by Holden Hurt RN  Outcome: Not Met This Shift  11/9/2020 1103 by Nathalie Fischer RN  Outcome: Met This Shift     Problem:  Body Temperature -  Risk of, Imbalanced  Goal: Ability to maintain a body temperature within defined limits  11/9/2020 1942 by Holden Hurt RN  Outcome: Not Met This Shift  11/9/2020 1103 by Nathalie Fischer RN  Outcome: Met This Shift  Goal: Will regain or maintain usual level of consciousness  11/9/2020 1942 by Holden Hurt RN  Outcome: Not Met This Shift  11/9/2020 1103 by Nathalie Fischer RN  Outcome: Met This Shift  Goal: Complications related to the disease process, condition or treatment will be avoided or minimized  11/9/2020 1942 by Holden Hurt RN  Outcome: Not Met This Shift  11/9/2020 1103 by Nathalie Fischer RN  Outcome: Met This Shift     Problem: Isolation Precautions - Risk of Spread of Infection  Goal: Prevent transmission of infection  11/9/2020 1942 by Holden Hurt RN  Outcome: Not Met This Shift  11/9/2020 1103 by Nathalie Fischer RN  Outcome: Met This Shift     Problem: Nutrition Deficits  Goal: Optimize nutrtional status  11/9/2020 1942 by Joana Sutherland RN  Outcome: Not Met This Shift  11/9/2020 1103 by Ariadne Marquez RN  Outcome: Met This Shift     Problem: Risk for Fluid Volume Deficit  Goal: Maintain normal heart rhythm  11/9/2020 1942 by Joana Sutherland RN  Outcome: Not Met This Shift  11/9/2020 1103 by Ariadne Marquez RN  Outcome: Met This Shift  Goal: Maintain absence of muscle cramping  11/9/2020 1942 by Joana Sutherland RN  Outcome: Not Met This Shift  11/9/2020 1103 by Ariadne Marquez RN  Outcome: Met This Shift  Goal: Maintain normal serum potassium, sodium, calcium, phosphorus, and pH  11/9/2020 1942 by Joana Sutherland RN  Outcome: Not Met This Shift  11/9/2020 1103 by Ariadne Marquez RN  Outcome: Met This Shift     Problem: Loneliness or Risk for Loneliness  Goal: Demonstrate positive use of time alone when socialization is not possible  11/9/2020 1942 by Joana Sutherland RN  Outcome: Not Met This Shift  11/9/2020 1103 by Ariadne Marquez RN  Outcome: Met This Shift     Problem: Fatigue  Goal: Verbalize increase energy and improved vitality  11/9/2020 1942 by Joana Sutherland RN  Outcome: Not Met This Shift  11/9/2020 1103 by Ariadne Marquez RN  Outcome: Met This Shift     Problem: Patient Education: Go to Patient Education Activity  Goal: Patient/Family Education  11/9/2020 1942 by Joana Sutherland RN  Outcome: Not Met This Shift  11/9/2020 1103 by Ariadne Marquez RN  Outcome: Met This Shift

## 2020-11-10 NOTE — PROGRESS NOTES
[facility] [facility]  Department of Internal Medicine  Division of Pulmonary, Critical Care and Sleep Medicine  Progress Note    Olivia Esquivel DO, Shiraz Santoyo MD, [facility]    Patient: Sami Ga  MRN: [id number]  : 1925    Encounter Time: 1:15 PM     Date of Admission: 2020  4:19 AM    Primary Care Physician: Coretha Severs, MD    Reason for Consultation: CoVID 19       SUBJECTIVE:   Oxygen at 9 liters  Core pack, and psych meds restarted. To start tube feeds  BS noted  Now negative (-) 1.3 liters      OBJECTIVE:     PHYSICAL EXAM:   VITALS:     Intake/Output Summary (Last 24 hours) at 11/10/2020 1315  Last data filed at 11/10/2020 1211  Gross per 24 hour   Intake 1060 ml   Output 1725 ml   Net -665 ml        CONSTITUTIONAL:   Alert   SKIN:     Mild skin discoloration  HEENT:     No thrush  NECK:    No bruits, No JVP apprechiated  CV:      AS  murmur, No rubs, No gallops  PULMONARY:   Couse BS,  No Wheezing, No Rales, No Rhonchi      No noted egophony  ABDOMEN:     Soft, non-tender. BS normal. No R/R/G  EXT:    No deformities . No clubbing. trace lower extremity edema, No venous stasis  PULSE:   Appears equal and palpable.   PSYCHIATRIC:  No acute psycosis  MS:    Gross weakness  NEUROLOGIC:   The clinical assessment is non-focal     DATA: IMAGING & TESTING:     LABORATORY TESTS:    CBC:   Lab Results   Component Value Date    WBC 14.5 11/10/2020    RBC 4.39 11/10/2020    HGB 11.7 11/10/2020    HCT 36.2 11/10/2020    MCV 82.5 11/10/2020    MCH 26.7 11/10/2020    MCHC 32.3 11/10/2020    RDW 15.1 11/10/2020     11/10/2020    MPV 10.7 11/10/2020     CMP:    Lab Results   Component Value Date     11/10/2020    K 4.7 11/10/2020    K 3.5 2020    CL 99 11/10/2020    CO2 24 11/10/2020    BUN 21 11/10/2020    CREATININE 0.4 11/10/2020    GFRAA >60 11/10/2020    LABGLOM >60 11/10/2020    GLUCOSE 118 11/10/2020    GLUCOSE 87 04/10/2012    PROT 5.2 11/10/2020    LABALBU 2.3 11/10/2020    LABALBU 4.1 04/10/2012    CALCIUM 8.1 11/10/2020    BILITOT 0.4 11/10/2020    ALKPHOS 47 11/10/2020    AST 20 11/10/2020    ALT 8 11/10/2020     PT/INR:    Lab Results   Component Value Date    PROTIME 11.1 11/10/2020    INR 1.0 11/10/2020     ABG:  No results found for: PH, PCO2, PO2, HCO3, BE, THGB, TCO2, O2SAT  TSH:    Lab Results   Component Value Date    TSH 1.230 08/08/2015     FERRITIN:    Lab Results   Component Value Date    FERRITIN 999 11/01/2020     Fibrinogen Level:  No components found for: FIB     PRO-BNP: No results found for: PROBNP   ABGs: No results found for: PH, PO2, PCO2  Hemoglobin A1C: No components found for: HGBA1C    IMAGING:  Imaging tests were completed and reviewed and discussed radiology and care team involved and reveals   Xr Chest Portable    Result Date: 11/2/2020  EXAMINATION: ONE XRAY VIEW OF THE CHEST 11/2/2020 9:29 am COMPARISON: November 1, 2020; October 21, 2015 HISTORY: ORDERING SYSTEM PROVIDED HISTORY: compare to previous, sob TECHNOLOGIST PROVIDED HISTORY: Reason for exam:->compare to previous, sob What reading provider will be dictating this exam?->CRC FINDINGS: Heart size is unable to be accurately assessed on this single portable view of the chest, but appears to be stable. There are new hazy and patchy airspace opacities throughout the right lung, predominantly affecting the mid and upper lung zones. Difficult to exclude small right pleural effusion. The left lung is clear. Bones are diffusely osteopenic. 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 This is unchanged dating back to 2015. New airspace opacities throughout the right lung suggestive of aspiration/pneumonia. Asymmetric pulmonary edema could have a similar appearance.      Xr Chest Portable    Result Date: 11/1/2020  EXAMINATION: ONE XRAY VIEW OF THE CHEST 11/1/2020 5:07 am COMPARISON: 10/21/2015 HISTORY: ORDERING SYSTEM PROVIDED HISTORY: chest pain TECHNOLOGIST PROVIDED HISTORY: Reason for exam:->chest pain What reading provider will be dictating this exam?->CRC FINDINGS: There are bilateral infiltrates with predominantly peripheral distribution. There is no pleural effusion or pneumothorax seen. There is no cardiomegaly or pulmonary vascular congestion. There are old right-sided rib fractures. 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 Bilateral infiltrates. Assessment:   1. COVID 19 pneumonia   2. Respiratory failure ( oxygen saturation 93 % on 8 L of high flow oxygen )   3. Leukocytosis, elevated procalcitonin, hyperferritinemia   4. Hyperglycemia  5. Dementia  6. Lumbar and cervical disc disease            Plan:   1. ID following on Unasyn  2. Stop steroids  3. Titrate oxygen if able  4. NPO aspiration assessment noted, PEG tube requested now with Core tract  5. Remdesivir to be finished 11/5   6. HOB elevated at all time  7. Start TF with bowel prep  8.  Wean off the oxygen       Niall Roque, MPH, Beatriz Riley  Professor of Medicine  Pulmonary, Critical Care and Sleep Medicine

## 2020-11-11 LAB
ALBUMIN SERPL-MCNC: 2.4 G/DL (ref 3.5–5.2)
ALP BLD-CCNC: 51 U/L (ref 35–104)
ALT SERPL-CCNC: 10 U/L (ref 0–32)
ANION GAP SERPL CALCULATED.3IONS-SCNC: 10 MMOL/L (ref 7–16)
ANISOCYTOSIS: ABNORMAL
APTT: 30.4 SEC (ref 24.5–35.1)
AST SERPL-CCNC: 20 U/L (ref 0–31)
BASOPHILS ABSOLUTE: 0 E9/L (ref 0–0.2)
BASOPHILS RELATIVE PERCENT: 0.1 % (ref 0–2)
BILIRUB SERPL-MCNC: 0.4 MG/DL (ref 0–1.2)
BUN BLDV-MCNC: 27 MG/DL (ref 8–23)
CALCIUM SERPL-MCNC: 8.3 MG/DL (ref 8.6–10.2)
CHLORIDE BLD-SCNC: 100 MMOL/L (ref 98–107)
CO2: 24 MMOL/L (ref 22–29)
CREAT SERPL-MCNC: 0.4 MG/DL (ref 0.5–1)
EOSINOPHILS ABSOLUTE: 0 E9/L (ref 0.05–0.5)
EOSINOPHILS RELATIVE PERCENT: 0.1 % (ref 0–6)
FIBRINOGEN: 505 MG/DL (ref 225–540)
GFR AFRICAN AMERICAN: >60
GFR NON-AFRICAN AMERICAN: >60 ML/MIN/1.73
GLUCOSE BLD-MCNC: 113 MG/DL (ref 74–99)
HCT VFR BLD CALC: 36.6 % (ref 34–48)
HEMOGLOBIN: 11.8 G/DL (ref 11.5–15.5)
HYPOCHROMIA: ABNORMAL
INR BLD: 1
LYMPHOCYTES ABSOLUTE: 0.7 E9/L (ref 1.5–4)
LYMPHOCYTES RELATIVE PERCENT: 5.2 % (ref 20–42)
MCH RBC QN AUTO: 26.4 PG (ref 26–35)
MCHC RBC AUTO-ENTMCNC: 32.2 % (ref 32–34.5)
MCV RBC AUTO: 81.9 FL (ref 80–99.9)
METAMYELOCYTES RELATIVE PERCENT: 2.6 % (ref 0–1)
METER GLUCOSE: 104 MG/DL (ref 74–99)
METER GLUCOSE: 91 MG/DL (ref 74–99)
METER GLUCOSE: 97 MG/DL (ref 74–99)
METER GLUCOSE: 99 MG/DL (ref 74–99)
MONOCYTES ABSOLUTE: 1.26 E9/L (ref 0.1–0.95)
MONOCYTES RELATIVE PERCENT: 8.7 % (ref 2–12)
MYELOCYTE PERCENT: 5.2 % (ref 0–0)
NEUTROPHILS ABSOLUTE: 12.04 E9/L (ref 1.8–7.3)
NEUTROPHILS RELATIVE PERCENT: 78.3 % (ref 43–80)
PDW BLD-RTO: 15.1 FL (ref 11.5–15)
PLATELET # BLD: 427 E9/L (ref 130–450)
PMV BLD AUTO: 10.6 FL (ref 7–12)
POTASSIUM SERPL-SCNC: 4.6 MMOL/L (ref 3.5–5)
PROTHROMBIN TIME: 11.1 SEC (ref 9.3–12.4)
RBC # BLD: 4.47 E12/L (ref 3.5–5.5)
SODIUM BLD-SCNC: 134 MMOL/L (ref 132–146)
TOTAL PROTEIN: 5.3 G/DL (ref 6.4–8.3)
WBC # BLD: 14 E9/L (ref 4.5–11.5)

## 2020-11-11 PROCEDURE — 80053 COMPREHEN METABOLIC PANEL: CPT

## 2020-11-11 PROCEDURE — 85025 COMPLETE CBC W/AUTO DIFF WBC: CPT

## 2020-11-11 PROCEDURE — 85610 PROTHROMBIN TIME: CPT

## 2020-11-11 PROCEDURE — 92611 MOTION FLUOROSCOPY/SWALLOW: CPT

## 2020-11-11 PROCEDURE — 92526 ORAL FUNCTION THERAPY: CPT

## 2020-11-11 PROCEDURE — 6360000002 HC RX W HCPCS: Performed by: INTERNAL MEDICINE

## 2020-11-11 PROCEDURE — 82962 GLUCOSE BLOOD TEST: CPT

## 2020-11-11 PROCEDURE — 97530 THERAPEUTIC ACTIVITIES: CPT

## 2020-11-11 PROCEDURE — 6370000000 HC RX 637 (ALT 250 FOR IP): Performed by: INTERNAL MEDICINE

## 2020-11-11 PROCEDURE — 85384 FIBRINOGEN ACTIVITY: CPT

## 2020-11-11 PROCEDURE — 2140000000 HC CCU INTERMEDIATE R&B

## 2020-11-11 PROCEDURE — 85730 THROMBOPLASTIN TIME PARTIAL: CPT

## 2020-11-11 PROCEDURE — 36415 COLL VENOUS BLD VENIPUNCTURE: CPT

## 2020-11-11 PROCEDURE — 99232 SBSQ HOSP IP/OBS MODERATE 35: CPT | Performed by: INTERNAL MEDICINE

## 2020-11-11 PROCEDURE — 97535 SELF CARE MNGMENT TRAINING: CPT

## 2020-11-11 PROCEDURE — 2700000000 HC OXYGEN THERAPY PER DAY

## 2020-11-11 RX ADMIN — HEPARIN SODIUM 5000 UNITS: 10000 INJECTION INTRAVENOUS; SUBCUTANEOUS at 17:42

## 2020-11-11 RX ADMIN — QUETIAPINE FUMARATE 25 MG: 25 TABLET ORAL at 20:01

## 2020-11-11 RX ADMIN — HEPARIN SODIUM 5000 UNITS: 10000 INJECTION INTRAVENOUS; SUBCUTANEOUS at 20:04

## 2020-11-11 RX ADMIN — ANTI-FUNGAL POWDER MICONAZOLE NITRATE TALC FREE: 1.42 POWDER TOPICAL at 09:26

## 2020-11-11 RX ADMIN — HEPARIN SODIUM 5000 UNITS: 10000 INJECTION INTRAVENOUS; SUBCUTANEOUS at 05:11

## 2020-11-11 RX ADMIN — DIVALPROEX SODIUM 125 MG: 125 CAPSULE, COATED PELLETS ORAL at 09:25

## 2020-11-11 RX ADMIN — PETROLATUM: 42 OINTMENT TOPICAL at 09:26

## 2020-11-11 RX ADMIN — DONEPEZIL HYDROCHLORIDE 5 MG: 5 TABLET, FILM COATED ORAL at 20:01

## 2020-11-11 RX ADMIN — DONEPEZIL HYDROCHLORIDE 5 MG: 5 TABLET, FILM COATED ORAL at 09:26

## 2020-11-11 RX ADMIN — ANTI-FUNGAL POWDER MICONAZOLE NITRATE TALC FREE: 1.42 POWDER TOPICAL at 20:02

## 2020-11-11 RX ADMIN — MEMANTINE HYDROCHLORIDE 10 MG: 10 TABLET, FILM COATED ORAL at 09:25

## 2020-11-11 RX ADMIN — LATANOPROST 1 DROP: 50 SOLUTION OPHTHALMIC at 20:03

## 2020-11-11 RX ADMIN — QUETIAPINE FUMARATE 25 MG: 25 TABLET ORAL at 09:26

## 2020-11-11 RX ADMIN — MEMANTINE HYDROCHLORIDE 10 MG: 10 TABLET, FILM COATED ORAL at 20:01

## 2020-11-11 RX ADMIN — DIVALPROEX SODIUM 125 MG: 125 CAPSULE, COATED PELLETS ORAL at 20:01

## 2020-11-11 RX ADMIN — PETROLATUM: 42 OINTMENT TOPICAL at 20:02

## 2020-11-11 ASSESSMENT — PAIN SCALES - GENERAL
PAINLEVEL_OUTOF10: 0

## 2020-11-11 NOTE — PROGRESS NOTES
Physical Therapy  Treatment Note    Name: Dara Greer  : 1925  MRN: [id number]    Referring Provider: Carolee Bledsoe DO    Date of Service: 2020    Evaluating PT: Kelly Black, PT, DPT, [id number]    Room #: 4198/1368-A  Diagnosis: COVID-19  PMHx/PSHx: RA, cellulitis, HTN, anemia, glaucoma, OA  Precautions: Fall risk, Droplet Plus Isolation (COVID-19), 1.5 L O2/min, continuous pulse ox, TAPS, miller, alarm    SUBJECTIVE:    Pt is poor historian and unable to provide social hx/PLOF. Per chart, pt is from an ECF. OBJECTIVE:   Initial Evaluation  Date: 11/3/20 Treatment Date:  20 Short Term/ Long Term   Goals   AM-PAC 6 Clicks     Was pt agreeable to Eval/treatment? Yes Yes     Does pt have pain? No current complaints/indications of pain Back pain when sitting EOB    Bed Mobility  Rolling: Max A  Supine to sit: Dependent  Sit to supine: Dependent  Scooting: Dependent to HOB Rolling: Max A  Supine to sit: Dependent x2  Sit to supine: Dependent x2  Scooting: Dependent to HOB Rolling: Mod A  Supine to sit: Mod A  Sit to supine: Mod A  Scooting: Mod A   Transfers Sit to stand: NT  Stand to sit: NT  Stand pivot: NT Sit to stand: NT  Stand to sit: NT  Stand pivot: NT Sit to stand: Mod A  Stand to sit: Mod A  Stand pivot: Mod A with AAD   Ambulation   NT NT Sidestep 3 feet with AAD with Mod A   Stair negotiation: ascended and descended NT NT NA   ROM BUE: Refer to OT note  BLE: WFL NT    Strength BUE: Refer to OT note  BLE: NT NT    Balance Sitting EOB: Max A  Dynamic Standing: NT Sitting EOB: Max A  Dynamic Standing: NT Sitting EOB: Min A  Dynamic Standing: Mod A with AAD     Pt is A & O x: 1 to person. Sensation: NT  Edema: Unremarkable. Therapeutic Exercises:   LAQ x2 sets x5 reps  Anterior/posterior trunk leans x5 reps    Patient education  Pt educated on health benefits of functional mobility while in hospital setting.     Patient response to education:   Pt verbalized understanding Pt demonstrated skill Pt requires further education in this area   Yes NA Yes     ASSESSMENT:    Vitals on 1.5L via NC:  Rest: O2 sat 91%, HR `105 bpm  After supine to sit transfer, sitting balance, and B LE therapeutic exercise: O2 sat 85-87%, -120 bpm  Repositioning in bed and B LE ROM: O2 sat 88-90%, HR 98 bpm  Conclusion of session: O2 sat 90%, HR 96 bpm    Comments:    Pt was supine in bed upon room entry, agreeable to PT treatment. Vitals were monitored throughout session. Pt is confused but pleasant and willing to participate with slight encouragement. Pt is able to follow simple commands. Pt was assisted to seated position at EOB. Pt had strong posterior lean with flexed posture noted. Pt was only able to partially correct lean when cued. Pt sat at EOB for 10 minutes as she completed all therapeutic exercises noted above. Pt was limited by lower back pain and requested to return back to bed when sitting; pt was easily encouraged to continue sitting. Pt was assisted back to supine after therapeutic exercise. PROM was performed on B LE once back in bed. Pt was then positioned with TAPS and rolled onto L side. Pt was left supine in bed with all needs met at conclusion of session. Treatment:  Patient practiced and was instructed in the following treatment:     Therapeutic activities: Pt completed all therapeutic activities noted above. Vitals and symptoms were skillfully monitored with all activity and during rest breaks. Pt was cued for technique during supine to sit transfer. Pt was cued to correct posterior lean and posture when sitting at EOB for 10+ minutes. Pt was skillfully positioned in bed with pillows and TAPS to protect skin integrity.  Therapeutic exercise: Pt completed all therapeutic exercises noted above. PROM of B LE was performed to pt tolerance. PLAN:    Patient is making slow progress towards established goals. Will continue with current POC.       Time in:

## 2020-11-11 NOTE — PLAN OF CARE
Problem: Skin Integrity:  Goal: Will show no infection signs and symptoms  Description: Will show no infection signs and symptoms  11/10/2020 2331 by Herve Martinez RN  Outcome: Not Met This Shift  11/10/2020 1331 by Criss Reyes RN  Outcome: Met This Shift  Goal: Absence of new skin breakdown  Description: Absence of new skin breakdown  11/10/2020 2331 by Herve Martinez RN  Outcome: Not Met This Shift  11/10/2020 1331 by Criss Reyes RN  Outcome: Met This Shift     Problem: Falls - Risk of:  Goal: Will remain free from falls  Description: Will remain free from falls  11/10/2020 2331 by Herve Martinez RN  Outcome: Not Met This Shift  11/10/2020 1331 by Criss Reyes RN  Outcome: Met This Shift  Goal: Absence of physical injury  Description: Absence of physical injury  11/10/2020 2331 by Herve Martinez RN  Outcome: Not Met This Shift  11/10/2020 1331 by Criss Reyes RN  Outcome: Met This Shift     Problem: Confusion - Acute:  Goal: Absence of continued neurological deterioration signs and symptoms  Description: Absence of continued neurological deterioration signs and symptoms  11/10/2020 2331 by Herve Martinez RN  Outcome: Not Met This Shift  11/10/2020 1331 by Criss Reyes RN  Outcome: Met This Shift  Goal: Mental status will be restored to baseline  Description: Mental status will be restored to baseline  11/10/2020 2331 by Herve Martinez RN  Outcome: Not Met This Shift  11/10/2020 1331 by Criss Reyes RN  Outcome: Met This Shift     Problem: Discharge Planning:  Goal: Ability to perform activities of daily living will improve  Description: Ability to perform activities of daily living will improve  11/10/2020 2331 by Herve Martinez RN  Outcome: Not Met This Shift  11/10/2020 1331 by Criss Reyes RN  Outcome: Met This Shift  Goal: Participates in care planning  Description: Participates in care planning  11/10/2020 2331 by Herve Martinez RN  Outcome: Not Met This Shift  11/10/2020 1331 by Erin Byrd RN  Outcome: Met This Shift     Problem: Injury - Risk of, Physical Injury:  Goal: Will remain free from falls  Description: Will remain free from falls  11/10/2020 2331 by Norma Almanza RN  Outcome: Not Met This Shift  11/10/2020 1331 by Erin Byrd RN  Outcome: Met This Shift  Goal: Absence of physical injury  Description: Absence of physical injury  11/10/2020 2331 by Norma Almanza RN  Outcome: Not Met This Shift  11/10/2020 1331 by Erin Byrd RN  Outcome: Met This Shift     Problem: Mood - Altered:  Goal: Mood stable  Description: Mood stable  11/10/2020 2331 by Norma Almanza RN  Outcome: Not Met This Shift  11/10/2020 1331 by Erin Byrd RN  Outcome: Met This Shift  Goal: Absence of abusive behavior  Description: Absence of abusive behavior  11/10/2020 2331 by Norma Almanza RN  Outcome: Not Met This Shift  11/10/2020 1331 by Erin Byrd RN  Outcome: Met This Shift  Goal: Verbalizations of feeling emotionally comfortable while being cared for will increase  Description: Verbalizations of feeling emotionally comfortable while being cared for will increase  11/10/2020 2331 by Norma Almanza RN  Outcome: Not Met This Shift  11/10/2020 1331 by Erin Byrd RN  Outcome: Met This Shift     Problem: Psychomotor Activity - Altered:  Goal: Absence of psychomotor disturbance signs and symptoms  Description: Absence of psychomotor disturbance signs and symptoms  11/10/2020 2331 by Norma Almanza RN  Outcome: Not Met This Shift  11/10/2020 1331 by Erin Byrd RN  Outcome: Met This Shift     Problem: Sensory Perception - Impaired:  Goal: Demonstrations of improved sensory functioning will increase  Description: Demonstrations of improved sensory functioning will increase  11/10/2020 2331 by Norma Almanza RN  Outcome: Not Met This Shift  11/10/2020 1331 by Erin Byrd RN  Outcome: Met This Shift  Goal: Decrease in sensory misperception frequency  Description: Decrease in sensory misperception frequency  11/10/2020 2331 by Twila Hager RN  Outcome: Not Met This Shift  11/10/2020 1331 by Cecily Garrett RN  Outcome: Met This Shift  Goal: Able to refrain from responding to false sensory perceptions  Description: Able to refrain from responding to false sensory perceptions  11/10/2020 2331 by Twila Hager RN  Outcome: Not Met This Shift  11/10/2020 1331 by Cecily Garrett RN  Outcome: Met This Shift  Goal: Demonstrates accurate environmental perceptions  Description: Demonstrates accurate environmental perceptions  11/10/2020 2331 by Twila Hager RN  Outcome: Not Met This Shift  11/10/2020 1331 by Cecily Garrett RN  Outcome: Met This Shift  Goal: Able to distinguish between reality-based and nonreality-based thinking  Description: Able to distinguish between reality-based and nonreality-based thinking  11/10/2020 2331 by Twila Hager RN  Outcome: Not Met This Shift  11/10/2020 1331 by Cecily Garrett RN  Outcome: Met This Shift  Goal: Able to interrupt nonreality-based thinking  Description: Able to interrupt nonreality-based thinking  11/10/2020 2331 by Twila Hager RN  Outcome: Not Met This Shift  11/10/2020 1331 by Cecily Garrett RN  Outcome: Met This Shift     Problem: Sleep Pattern Disturbance:  Goal: Appears well-rested  Description: Appears well-rested  11/10/2020 2331 by Twila Hager RN  Outcome: Not Met This Shift  11/10/2020 1331 by Cecily Garrett RN  Outcome: Met This Shift     Problem: Pain:  Goal: Pain level will decrease  Description: Pain level will decrease  11/10/2020 2331 by Twila Hager RN  Outcome: Not Met This Shift  11/10/2020 1331 by Cecily Garrett RN  Outcome: Met This Shift  Goal: Control of acute pain  Description: Control of acute pain  11/10/2020 2331 by Twila Hager RN  Outcome: Not Met This Shift  11/10/2020 1331 by Cecily Garrett RN  Outcome: Met This Shift  Goal: Control of chronic pain  Description: Control of chronic pain  11/10/2020 2331 by Tian Marcos RN  Outcome: Not Met This Shift  11/10/2020 1331 by Hugo Barreto RN  Outcome: Met This Shift     Problem: Airway Clearance - Ineffective  Goal: Achieve or maintain patent airway  11/10/2020 2331 by Tian Marcos RN  Outcome: Not Met This Shift  11/10/2020 1331 by Hugo Barreto RN  Outcome: Met This Shift     Problem: Gas Exchange - Impaired  Goal: Absence of hypoxia  11/10/2020 2331 by Tian Marcos RN  Outcome: Not Met This Shift  11/10/2020 1331 by Hugo Barreto RN  Outcome: Met This Shift  Goal: Promote optimal lung function  11/10/2020 2331 by Tian Marcos RN  Outcome: Not Met This Shift  11/10/2020 1331 by Hugo Barreto RN  Outcome: Met This Shift     Problem: Breathing Pattern - Ineffective  Goal: Ability to achieve and maintain a regular respiratory rate  11/10/2020 2331 by Tian Marcos RN  Outcome: Not Met This Shift  11/10/2020 1331 by Hugo Barreto RN  Outcome: Met This Shift     Problem:  Body Temperature -  Risk of, Imbalanced  Goal: Ability to maintain a body temperature within defined limits  11/10/2020 2331 by Tian Marcos RN  Outcome: Not Met This Shift  11/10/2020 1331 by Hugo Barreto RN  Outcome: Met This Shift  Goal: Will regain or maintain usual level of consciousness  11/10/2020 2331 by Tian Marcos RN  Outcome: Not Met This Shift  11/10/2020 1331 by Hugo Barreto RN  Outcome: Met This Shift  Goal: Complications related to the disease process, condition or treatment will be avoided or minimized  11/10/2020 2331 by Tian Marcos RN  Outcome: Not Met This Shift  11/10/2020 1331 by Hugo Barreto RN  Outcome: Met This Shift     Problem: Isolation Precautions - Risk of Spread of Infection  Goal: Prevent transmission of infection  11/10/2020 2331 by Tian Marcos RN  Outcome: Not Met This Shift  11/10/2020 1331 by Hugo Barreto RN  Outcome: Met This Shift     Problem: Nutrition Deficits  Goal: Optimize nutrtional status  11/10/2020 2331 by Denis Madrid RN  Outcome: Not Met This Shift  11/10/2020 1331 by Leatha Bonilla RN  Outcome: Met This Shift     Problem: Risk for Fluid Volume Deficit  Goal: Maintain normal heart rhythm  11/10/2020 2331 by Denis Madrid RN  Outcome: Not Met This Shift  11/10/2020 1331 by Leatha Bonilla RN  Outcome: Met This Shift  Goal: Maintain absence of muscle cramping  11/10/2020 2331 by Denis Madrid RN  Outcome: Not Met This Shift  11/10/2020 1331 by Leatha Bonilla RN  Outcome: Met This Shift  Goal: Maintain normal serum potassium, sodium, calcium, phosphorus, and pH  11/10/2020 2331 by Denis Madrid RN  Outcome: Not Met This Shift  11/10/2020 1331 by Leatha Bonilla RN  Outcome: Met This Shift     Problem: Loneliness or Risk for Loneliness  Goal: Demonstrate positive use of time alone when socialization is not possible  11/10/2020 2331 by Denis Madrid RN  Outcome: Not Met This Shift  11/10/2020 1331 by Leatha Bonilla RN  Outcome: Met This Shift     Problem: Fatigue  Goal: Verbalize increase energy and improved vitality  11/10/2020 2331 by Denis Madrid RN  Outcome: Not Met This Shift  11/10/2020 1331 by Leatha Bonilla RN  Outcome: Met This Shift     Problem: Patient Education: Go to Patient Education Activity  Goal: Patient/Family Education  11/10/2020 2331 by Denis Madrid RN  Outcome: Not Met This Shift  11/10/2020 1331 by Leatha Bonilla RN  Outcome: Met This Shift

## 2020-11-11 NOTE — PROGRESS NOTES
[place]  Department of Internal Medicine  Division of Pulmonary, Critical Care and Sleep Medicine  Progress Note    Michelle Bowman DO, Julio Michelle MD, [facility]    Patient: Adan Dinh  MRN: [id number]  : 1925    Encounter Time: 11:58 AM     Date of Admission: 2020  4:19 AM    Primary Care Physician: Luan Ham MD    Reason for Consultation: CoVID 19       SUBJECTIVE:   Oxygen at 3 liters  Core pack, and psych meds restarted. To start tube feeds  BS noted  Now negative (-) 2.8 liters      OBJECTIVE:     PHYSICAL EXAM:   VITALS:     Intake/Output Summary (Last 24 hours) at 2020 1158  Last data filed at 2020 0921  Gross per 24 hour   Intake 520 ml   Output 1950 ml   Net -1430 ml        CONSTITUTIONAL:   Alert   SKIN:     Mild skin discoloration  HEENT:     No thrush  NECK:    No bruits, No JVP apprechiated  CV:      AS  murmur, No rubs, No gallops  PULMONARY:   Couse BS,  No Wheezing, No Rales, No Rhonchi      No noted egophony  ABDOMEN:     Soft, non-tender. BS normal. No R/R/G  EXT:    No deformities . No clubbing. trace lower extremity edema, No venous stasis  PULSE:   Appears equal and palpable.   PSYCHIATRIC:  No acute psycosis  MS:    Gross weakness  NEUROLOGIC:   The clinical assessment is non-focal     DATA: IMAGING & TESTING:     LABORATORY TESTS:    CBC:   Lab Results   Component Value Date    WBC 14.0 2020    RBC 4.47 2020    HGB 11.8 2020    HCT 36.6 2020    MCV 81.9 2020    MCH 26.4 2020    MCHC 32.2 2020    RDW 15.1 2020     2020    MPV 10.6 2020     CMP:    Lab Results   Component Value Date     2020    K 4.6 2020    K 3.5 2020     2020    CO2 24 2020    BUN 27 2020    CREATININE 0.4 2020    GFRAA >60 2020    LABGLOM >60 2020    GLUCOSE 113 2020    GLUCOSE 87 04/10/2012    PROT 5.3 11/11/2020    LABALBU 2.4 11/11/2020    LABALBU 4.1 04/10/2012    CALCIUM 8.3 11/11/2020    BILITOT 0.4 11/11/2020    ALKPHOS 51 11/11/2020    AST 20 11/11/2020    ALT 10 11/11/2020     PT/INR:    Lab Results   Component Value Date    PROTIME 11.1 11/11/2020    INR 1.0 11/11/2020     ABG:  No results found for: PH, PCO2, PO2, HCO3, BE, THGB, TCO2, O2SAT  TSH:    Lab Results   Component Value Date    TSH 1.230 08/08/2015     FERRITIN:    Lab Results   Component Value Date    FERRITIN 999 11/01/2020     Fibrinogen Level:  No components found for: FIB     PRO-BNP: No results found for: PROBNP   ABGs: No results found for: PH, PO2, PCO2  Hemoglobin A1C: No components found for: HGBA1C    IMAGING:  Imaging tests were completed and reviewed and discussed radiology and care team involved and reveals   Xr Chest Portable    Result Date: 11/2/2020  EXAMINATION: ONE XRAY VIEW OF THE CHEST 11/2/2020 9:29 am COMPARISON: November 1, 2020; October 21, 2015 HISTORY: ORDERING SYSTEM PROVIDED HISTORY: compare to previous, sob TECHNOLOGIST PROVIDED HISTORY: Reason for exam:->compare to previous, sob What reading provider will be dictating this exam?->CRC FINDINGS: Heart size is unable to be accurately assessed on this single portable view of the chest, but appears to be stable. There are new hazy and patchy airspace opacities throughout the right lung, predominantly affecting the mid and upper lung zones. Difficult to exclude small right pleural effusion. The left lung is clear. Bones are diffusely osteopenic. 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 This is unchanged dating back to 2015. New airspace opacities throughout the right lung suggestive of aspiration/pneumonia. Asymmetric pulmonary edema could have a similar appearance.      Xr Chest Portable    Result Date: 11/1/2020  EXAMINATION: ONE XRAY VIEW OF THE CHEST 11/1/2020 5:07 am COMPARISON: 10/21/2015 HISTORY: ORDERING SYSTEM PROVIDED HISTORY: chest pain TECHNOLOGIST PROVIDED HISTORY: Reason for exam:->chest pain What reading provider will be dictating this exam?->CRC FINDINGS: There are bilateral infiltrates with predominantly peripheral distribution. There is no pleural effusion or pneumothorax seen. There is no cardiomegaly or pulmonary vascular congestion. There are old right-sided rib fractures. 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 Bilateral infiltrates. Assessment:   1. COVID 19 pneumonia   2. Respiratory failure ( oxygen saturation 93 % on 8 L of high flow oxygen )   3. Leukocytosis, elevated procalcitonin, hyperferritinemia   4. Hyperglycemia  5. Dementia  6. Lumbar and cervical disc disease            Plan:   1. ID stopped Unasyn  2. Stop steroids  3. Titrate oxygen if able  4. NPO aspiration assessment noted, PEG tube requested now with Core tract  5. Remdesivir finished 11/5  6. HOB elevated at all time  7. Start TF with bowel prep noted  8. Wean off the oxygen improved  9. COmbivent QID  10.  Discharge planning    Mark Petersen, MPH, [facility]  Professor of Medicine  Pulmonary, Critical Care and Sleep Medicine

## 2020-11-11 NOTE — PROGRESS NOTES
Hospitalist Progress Note      SYNOPSIS: Patient admitted on 2020 History is obtained from EMR  and ER physician due to patient currently nonverbal.  Hailee Hastings presented to the ER from [facility] for evaluation of hypoxia. She was found to be positive for coronavirus at their facility. Upon ER arrival she was found to be 86%. In the ER, she was found to be in renal failure and hyponatremic. Chest x-ray showing bilateral infiltrates. I spoke with her daughter, Eder Grover, who is the POA and discussed CODE STATUS. Eder Grover wants her to be a limited code with no intubation, no CPR, no emergency resuscitative meds, no defibrillation. She wishes for her to still get fluids for her renal failure and treatment for coronavirus. SUBJECTIVE:  Stable overnight. No other overnight issues reported. Patient seen and examined  Records reviewed. Patient remains nonverbal  NG tube in place with tube feeds  On 3L NC  Patient more awake today, answering questions with short responses, states she is feeling better today  Passed swallow study for minced and moist, no straw      Temp (24hrs), Av.9 °F (36.6 °C), Min:97 °F (36.1 °C), Max:98.6 °F (37 °C)    DIET: DIET TUBE FEED CONTINUOUS/CYCLIC NPO; Other Tube Feeding (must specify product in comment) (Diabetic @ 40 ml/hr +1 protein modular daily to provide 960 ml tv, 1152 kcal, 58 gm pro, 773 ml free water (1252 kcal, 84 gm pro)); Nasogastric; Continuous. .. CODE: Limited    Intake/Output Summary (Last 24 hours) at 2020 0819  Last data filed at 2020 0550  Gross per 24 hour   Intake 520 ml   Output 1950 ml   Net -1430 ml       Review of Systems  Unable to be obtained      OBJECTIVE:    BP (!) 140/78   Pulse 90   Temp 98.6 °F (37 °C) (Oral)   Resp 20   Ht 5' 4\" (1.626 m)   Wt 115 lb (52.2 kg)   SpO2 97%   BMI 19.74 kg/m²     General appearance: Currently nonverbal, NG in place, 3L NC  HEENT:  Conjunctivae/corneas clear. Neck: Supple.  No jugular WBC 14.7* 14.5* 14.0*   HGB 11.6 11.7 11.8   HCT 35.1 36.2 36.6    391 427       Recent Labs     11/09/20  0322 11/10/20  0245 11/11/20  0246    133 134   K 4.0 4.7 4.6    99 100   CO2 25 24 24   BUN 21 21 27*   CREATININE 0.4* 0.4* 0.4*   CALCIUM 7.9* 8.1* 8.3*       Recent Labs     11/09/20  0322 11/10/20  0245 11/11/20  0246   PROT 4.8* 5.2* 5.3*   ALKPHOS 48 47 51   ALT 7 8 10   AST 14 20 20   BILITOT 0.4 0.4 0.4       Recent Labs     11/09/20  0322 11/10/20  0245 11/11/20  0246   INR 1.1 1.0 1.0       No results for input(s): Richardson Isaacs in the last 72 hours. Chronic labs:    Lab Results   Component Value Date    CHOL 149 05/03/2013    TRIG 69 05/03/2013    HDL 56.0 05/03/2013    LDLCALC 79 05/03/2013    TSH 1.230 08/08/2015    INR 1.0 11/11/2020    LABA1C 5.9 (H) 11/03/2020       Radiology: REVIEWED DAILY    +++++++++++++++++++++++++++++++++++++++++++++++++  Yunier Sims Physician - 2020 [place], [place]  +++++++++++++++++++++++++++++++++++++++++++++++++  NOTE: This report was transcribed using voice recognition software. Every effort was made to ensure accuracy; however, inadvertent computerized transcription errors may be present.

## 2020-11-11 NOTE — PROGRESS NOTES
 guaiFENesin-dextromethorphan (ROBITUSSIN DM) 100-10 MG/5ML syrup 5 mL  5 mL Oral Q4H PRN Jeff Huitron, DO   5 mL at 11/08/20 0851    0.9 % sodium chloride bolus  30 mL Intravenous PRN Eric Evans MD        latanoprost (XALATAN) 0.005 % ophthalmic solution 1 drop  1 drop Both Eyes Nightly [place] BONIFACIO Huitron, DO   1 drop at 11/10/20 2051    bisacodyl (DULCOLAX) suppository 10 mg  10 mg Rectal PRN Johanna Boyle, DO           PHYSICAL EXAM:      Vitals:     /74   Pulse 82   Temp 96.8 °F (36 °C) (Temporal)   Resp 18   Ht 5' 4\" (1.626 m)   Wt 115 lb (52.2 kg)   SpO2 96%   BMI 19.74 kg/m²     General Appearance:     More awake and alert . Head:    Normocephalic, atraumatic   Eyes:    No pallor, no icterus,   Ears:    No obvious deformity or drainage.    Nose:   No nasal drainage   Throat:  Dry oral mucosa    Neck:   Supple, no lymphadenopathy   Lungs:     Diminished breath sound    Heart:     Regular     Abdomen:     Soft, non-tender, bowel sounds present    Extremities:   No edema, no cyanosis   Pulses:   Dorsalis pedis palpable    Skin:   No rash       Lab Results   Component Value Date    WBC 14.0 11/11/2020    RBC 4.47 11/11/2020    HGB 11.8 11/11/2020    HCT 36.6 11/11/2020     11/11/2020    MCV 81.9 11/11/2020    MCH 26.4 11/11/2020    MCHC 32.2 11/11/2020    RDW 15.1 11/11/2020    SEGSPCT 58 05/06/2013    METASPCT 2.6 11/11/2020    LYMPHOPCT 5.2 11/11/2020    PROMYELOPCT 0.9 11/10/2020    MONOPCT 8.7 11/11/2020    MYELOPCT 5.2 11/11/2020    BASOPCT 0.1 11/11/2020    MONOSABS 1.26 11/11/2020    LYMPHSABS 0.70 11/11/2020    EOSABS 0.00 11/11/2020    BASOSABS 0.00 11/11/2020       CMP     Lab Results   Component Value Date     11/11/2020    K 4.6 11/11/2020    K 3.5 11/03/2020     11/11/2020    CO2 24 11/11/2020    BUN 27 11/11/2020    CREATININE 0.4 11/11/2020    GFRAA >60 11/11/2020    LABGLOM >60 11/11/2020    GLUCOSE 113 11/11/2020    GLUCOSE 87 04/10/2012 PROT 5.3 11/11/2020    LABALBU 2.4 11/11/2020    LABALBU 4.1 04/10/2012    CALCIUM 8.3 11/11/2020    BILITOT 0.4 11/11/2020    ALKPHOS 51 11/11/2020    AST 20 11/11/2020    ALT 10 11/11/2020         Hepatic Function Panel:    Lab Results   Component Value Date    ALKPHOS 51 11/11/2020    ALT 10 11/11/2020    AST 20 11/11/2020    PROT 5.3 11/11/2020    BILITOT 0.4 11/11/2020    LABALBU 2.4 11/11/2020    LABALBU 4.1 04/10/2012       U/A:    Lab Results   Component Value Date    COLORU Yellow 10/22/2015    PHUR 5.5 10/22/2015    WBCUA 0-1 10/04/2015    WBCUA NONE 11/10/2010    RBCUA NONE 10/04/2015    RBCUA NONE 11/10/2010    BACTERIA MANY 10/04/2015    CLARITYU Clear 10/22/2015    SPECGRAV >=1.030 10/22/2015    LEUKOCYTESUR Negative 10/22/2015    UROBILINOGEN 0.2 10/22/2015    BILIRUBINUR Negative 10/22/2015    BILIRUBINUR NEGATIVE 11/10/2010    BLOODU Negative 10/22/2015    GLUCOSEU Negative 10/22/2015    GLUCOSEU NEGATIVE 11/10/2010       MICROBIOLOGY:    COVID +ve at Nursing home     Radiology :    Chest X ray : Bilateral infiltrates       IMPRESSION:     1. COVID 19 pneumonia  S/p remdesivir   2. Respiratory failure ( oxygen saturation 96 % on 3  L of high flow oxygen )     RECOMMENDATIONS:      1. Decadron 6 mg po q 24 hrs   2.  Oxygen supplement

## 2020-11-11 NOTE — PROGRESS NOTES
Occupational Therapy  OT BEDSIDE TREATMENT NOTE      Date:2020  Patient Name: Jamilah Flowers  MRN: [id number]  : 1925  Room: [address] [place] [address]     Referring Malia Noel DO      Evaluating OT: Blade Coates OTR/L #[id number]      AM-PAC Daily Activity Raw Score:      Recommended Adaptive Equipment:  TBD      Diagnosis: COVID-19   Patient presented to the hospital for SOB and hypoxia      Pertinent Medical History:    Past Medical History           Past Medical History:   Diagnosis Date    Anemia      Cellulitis      GERD (gastroesophageal reflux disease)      Glaucoma      Hypertension      Hypokalemia      Insomnia      Osteoarthritis      Rheumatoid arthritis(714.0)           Precautions:  Falls, Droplet plus (COVID-19),  13L NRB, continuous pulse ox, TAPS, cognition, bed alarm     Home Living: Pt admitted from [facility]  Bathroom setup: ?  Equipment owned: ?     Prior Level of Function: ? with ADLs , ?  Level of assist for transfers / ambulation  Melina Snuffer     Pain Level: Pt denies pain this session     Cognition: A&O: 1/4 (self only); Follows 1 step directions with max cuing (less than 10% of time)              Memory:  poor              Sequencing:  poor              Problem solving:  poor              Judgement/safety:  poor                Functional Assessment:    Initial Eval Status  Date: 20 Treatment Status  Date: 20 Short Term Goals = Long Term Goals  Treatment frequency: 1-4x/wk; PRN   Feeding NPO  NGT     Grooming Maximal Assist      Hand over hand assist; task initiation   Max A to wash hands and face     Hand over hand assist ; task initiation - cuing on sequencing, hand over hand assist to initiate Minimal Assist    UB Dressing Dependent   Dep     Moderate Assist    LB Dressing Dependent   Dep Maximal Assist    Bathing Dependent  Dep  Maximal Assist    Toileting Dependent   Dep   (incontinent) Maximal Assist    Bed Mobility  Rolling:  Dependent  Supine to sit: Dependent   Sit to supine: Dependent   Rolling: Dep     Supine<>Sit: Dep x2 Rolling: mod A  Supine to sit: Maximal Assist   Sit to supine: Maximal Assist    Functional Transfers NT   NT Maximal Assist    Functional Mobility NT   NT     Balance Sitting:     Static:  Dep    + retropulsion  Sit:  Max A / Dep     Activity Tolerance P  P+ F   Visual/  Perceptual Continue to assess                         Vitals:  1.5L via NC  Rest: O2 sat 90-91%, -108  ADL: O2 sat 88-90%,   EOB:  O2 sat 85%,    Repositioning in bed:  O2 sat 89-91%, HR 98  Rest semi-supine in bed:  O2 sat 91%, HR 96      Comments: Upon arrival pt supine in bed and agreeable to OT session - nursing clearance obtained prior to session. At end of session seated in chair, all lines and tubes intact, call light within reach. Treatment:  Facilitation of bed mobility, sitting balance at EOB (10 minutes; impacting ADLs; addressing posture and weight shifting) - skilled cuing on hand placement, posture, body mechanics, energy conservation techniques and safety. Therapist facilitated B UE ROM 3x10 - cuing on eugene and form. Therapist facilitated self-care retraining: UB/LB self-care tasks (gown, partial bathing task, socks), toileting hygiene task and grooming tasks (hand over hand assist); cuing on sequencing, modified techniques, posture, safety. Skilled monitoring of HR, O2 sats and pts response to treatment. · Pt has made fair progress towards set goals.    · Continue with current plan of care: addressing adl retaining and activity tolerance      Treatment Time In:1300            Treatment Time Out: 1330              Treatment Charges: Mins Units   Ther Ex  22737     Manual Therapy 60470     Thera Activities 40728 13 1   ADL/Home Mgt [id number] 12 1   Neuro Re-ed 52721     Group Therapy      Orthotic manage/training  [id number]     Non-Billable Time     Total Timed Treatment 25 2        Tere Beltran OTR/L #[id number]

## 2020-11-11 NOTE — CARE COORDINATION
Care Coordination:  Pt passed bedside swallow. Plan for [facility] rehab, down to 3 ltr nc. I spoke to benson at [facility] at , records faxed to [phone]. She will let me know if they can accept and initiate precert.       Israel Madera    Addendum: still waiting for ptx and otx evals  to fax to [facility]    Israel Madera

## 2020-11-11 NOTE — PLAN OF CARE
Problem: Inadequate oral food/beverage intake (NI-2.1)  Goal: Food and/or Nutrient Delivery  Description: Individualized approach for food/nutrient provision.   11/10/2020 1224 by Kathi Canchola RD, HERBIE  Outcome: Met This Shift

## 2020-11-11 NOTE — CARE COORDINATION
Care Coordination: Reviewed with bedside nurse regarding plan for peg placement. Attending plans to repeat Swallow eval and if pt again fails, then surgery plans to place peg on Friday. Will need to initiate precert and see how long it will be good -- pending clearance from medical team. I have called and left detailed message for Daughter Malina Figueroa to confirm plan is still [facility] in [address] and to check to see if she has spoke to attending regarding swallow eval and peg tube. Awaiting return call    Melissa Snell     Addendum:  Spoke to Malina Figueroa, reviewed 02 demand now to 3 ltr . She is agreeable to proceed with peg, I explained that physicians  may repeat a swallow eval and if still fails then will proceed with peg. Dr Christi Miranda here and can check with Dr Ricardo Mayfield if he will proceed tomorrow instead of Friday. If pt does pass swallow eval and is more alert and o2 demand remains stable, may be able to dc back to [facility].   I have called Mary Reinoso at [phone] and need for updated notes to proceed with precert to [facility] or if pt can return to [facility]

## 2020-11-12 LAB
ALBUMIN SERPL-MCNC: 2.6 G/DL (ref 3.5–5.2)
ALP BLD-CCNC: 61 U/L (ref 35–104)
ALT SERPL-CCNC: 11 U/L (ref 0–32)
ANION GAP SERPL CALCULATED.3IONS-SCNC: 9 MMOL/L (ref 7–16)
ANISOCYTOSIS: ABNORMAL
APTT: 28.6 SEC (ref 24.5–35.1)
AST SERPL-CCNC: 20 U/L (ref 0–31)
ATYPICAL LYMPHOCYTE RELATIVE PERCENT: 3 % (ref 0–4)
BASOPHILS ABSOLUTE: 0 E9/L (ref 0–0.2)
BASOPHILS RELATIVE PERCENT: 0 % (ref 0–2)
BILIRUB SERPL-MCNC: 0.8 MG/DL (ref 0–1.2)
BUN BLDV-MCNC: 28 MG/DL (ref 8–23)
CALCIUM SERPL-MCNC: 8.2 MG/DL (ref 8.6–10.2)
CHLORIDE BLD-SCNC: 99 MMOL/L (ref 98–107)
CO2: 26 MMOL/L (ref 22–29)
CREAT SERPL-MCNC: 0.4 MG/DL (ref 0.5–1)
EOSINOPHILS ABSOLUTE: 0.12 E9/L (ref 0.05–0.5)
EOSINOPHILS RELATIVE PERCENT: 1 % (ref 0–6)
FIBRINOGEN: 572 MG/DL (ref 225–540)
GFR AFRICAN AMERICAN: >60
GFR NON-AFRICAN AMERICAN: >60 ML/MIN/1.73
GLUCOSE BLD-MCNC: 74 MG/DL (ref 74–99)
HCT VFR BLD CALC: 38.2 % (ref 34–48)
HEMOGLOBIN: 12.6 G/DL (ref 11.5–15.5)
HYPOCHROMIA: ABNORMAL
INR BLD: 1
LYMPHOCYTES ABSOLUTE: 1.29 E9/L (ref 1.5–4)
LYMPHOCYTES RELATIVE PERCENT: 8 % (ref 20–42)
MCH RBC QN AUTO: 27.2 PG (ref 26–35)
MCHC RBC AUTO-ENTMCNC: 33 % (ref 32–34.5)
MCV RBC AUTO: 82.3 FL (ref 80–99.9)
METAMYELOCYTES RELATIVE PERCENT: 3 % (ref 0–1)
METER GLUCOSE: 107 MG/DL (ref 74–99)
METER GLUCOSE: 87 MG/DL (ref 74–99)
METER GLUCOSE: 89 MG/DL (ref 74–99)
METER GLUCOSE: 99 MG/DL (ref 74–99)
MONOCYTES ABSOLUTE: 1.17 E9/L (ref 0.1–0.95)
MONOCYTES RELATIVE PERCENT: 10 % (ref 2–12)
MYELOCYTE PERCENT: 1 % (ref 0–0)
NEUTROPHILS ABSOLUTE: 8.66 E9/L (ref 1.8–7.3)
NEUTROPHILS RELATIVE PERCENT: 70 % (ref 43–80)
NUCLEATED RED BLOOD CELLS: 1 /100 WBC
OVALOCYTES: ABNORMAL
PDW BLD-RTO: 15.8 FL (ref 11.5–15)
PLATELET # BLD: 405 E9/L (ref 130–450)
PMV BLD AUTO: 10.9 FL (ref 7–12)
POIKILOCYTES: ABNORMAL
POLYCHROMASIA: ABNORMAL
POTASSIUM SERPL-SCNC: 4.3 MMOL/L (ref 3.5–5)
PROMYELOCYTES PERCENT: 4 % (ref 0–0)
PROTHROMBIN TIME: 11.3 SEC (ref 9.3–12.4)
RBC # BLD: 4.64 E12/L (ref 3.5–5.5)
SODIUM BLD-SCNC: 134 MMOL/L (ref 132–146)
TEAR DROP CELLS: ABNORMAL
TOTAL PROTEIN: 5.5 G/DL (ref 6.4–8.3)
WBC # BLD: 11.7 E9/L (ref 4.5–11.5)

## 2020-11-12 PROCEDURE — 6360000002 HC RX W HCPCS: Performed by: INTERNAL MEDICINE

## 2020-11-12 PROCEDURE — 85384 FIBRINOGEN ACTIVITY: CPT

## 2020-11-12 PROCEDURE — 85610 PROTHROMBIN TIME: CPT

## 2020-11-12 PROCEDURE — 2700000000 HC OXYGEN THERAPY PER DAY

## 2020-11-12 PROCEDURE — 85730 THROMBOPLASTIN TIME PARTIAL: CPT

## 2020-11-12 PROCEDURE — 99232 SBSQ HOSP IP/OBS MODERATE 35: CPT | Performed by: INTERNAL MEDICINE

## 2020-11-12 PROCEDURE — 80053 COMPREHEN METABOLIC PANEL: CPT

## 2020-11-12 PROCEDURE — 85025 COMPLETE CBC W/AUTO DIFF WBC: CPT

## 2020-11-12 PROCEDURE — 82962 GLUCOSE BLOOD TEST: CPT

## 2020-11-12 PROCEDURE — 36415 COLL VENOUS BLD VENIPUNCTURE: CPT

## 2020-11-12 PROCEDURE — 2140000000 HC CCU INTERMEDIATE R&B

## 2020-11-12 PROCEDURE — 6370000000 HC RX 637 (ALT 250 FOR IP): Performed by: INTERNAL MEDICINE

## 2020-11-12 RX ADMIN — PETROLATUM: 42 OINTMENT TOPICAL at 08:04

## 2020-11-12 RX ADMIN — DIVALPROEX SODIUM 125 MG: 125 CAPSULE, COATED PELLETS ORAL at 08:04

## 2020-11-12 RX ADMIN — HEPARIN SODIUM 5000 UNITS: 10000 INJECTION INTRAVENOUS; SUBCUTANEOUS at 21:51

## 2020-11-12 RX ADMIN — LATANOPROST 1 DROP: 50 SOLUTION OPHTHALMIC at 21:50

## 2020-11-12 RX ADMIN — ANTI-FUNGAL POWDER MICONAZOLE NITRATE TALC FREE: 1.42 POWDER TOPICAL at 21:50

## 2020-11-12 RX ADMIN — QUETIAPINE FUMARATE 25 MG: 25 TABLET ORAL at 08:04

## 2020-11-12 RX ADMIN — ANTI-FUNGAL POWDER MICONAZOLE NITRATE TALC FREE: 1.42 POWDER TOPICAL at 08:04

## 2020-11-12 RX ADMIN — HEPARIN SODIUM 5000 UNITS: 10000 INJECTION INTRAVENOUS; SUBCUTANEOUS at 05:36

## 2020-11-12 RX ADMIN — PETROLATUM: 42 OINTMENT TOPICAL at 20:50

## 2020-11-12 RX ADMIN — HEPARIN SODIUM 5000 UNITS: 10000 INJECTION INTRAVENOUS; SUBCUTANEOUS at 15:58

## 2020-11-12 RX ADMIN — DIVALPROEX SODIUM 125 MG: 125 CAPSULE, COATED PELLETS ORAL at 20:49

## 2020-11-12 RX ADMIN — DONEPEZIL HYDROCHLORIDE 5 MG: 5 TABLET, FILM COATED ORAL at 20:48

## 2020-11-12 RX ADMIN — QUETIAPINE FUMARATE 25 MG: 25 TABLET ORAL at 20:48

## 2020-11-12 RX ADMIN — MEMANTINE HYDROCHLORIDE 10 MG: 10 TABLET, FILM COATED ORAL at 08:04

## 2020-11-12 RX ADMIN — DONEPEZIL HYDROCHLORIDE 5 MG: 5 TABLET, FILM COATED ORAL at 08:04

## 2020-11-12 RX ADMIN — MEMANTINE HYDROCHLORIDE 10 MG: 10 TABLET, FILM COATED ORAL at 20:49

## 2020-11-12 ASSESSMENT — PAIN SCALES - GENERAL
PAINLEVEL_OUTOF10: 0

## 2020-11-12 NOTE — PROGRESS NOTES
Hospitalist Progress Note      SYNOPSIS: Patient admitted on 2020 History is obtained from EMR  and ER physician due to patient currently nonverbal.  Antonia Couch presented to the ER from [facility] for evaluation of hypoxia. She was found to be positive for coronavirus at their facility. Upon ER arrival she was found to be 86%. In the ER, she was found to be in renal failure and hyponatremic. Chest x-ray showing bilateral infiltrates. I spoke with her daughter, Agustina Real, who is the POA and discussed CODE STATUS. Agustina Real wants her to be a limited code with no intubation, no CPR, no emergency resuscitative meds, no defibrillation. She wishes for her to still get fluids for her renal failure and treatment for coronavirus. SUBJECTIVE:  Stable overnight. No other overnight issues reported. Patient seen and examined  Records reviewed. On 2L NC  Patient more awake today, answering questions with short responses  Passed swallow study for minced and moist, no straw  Feeling better today  Needs help eating      Temp (24hrs), Av °F (36.1 °C), Min:97 °F (36.1 °C), Max:97.1 °F (36.2 °C)    DIET: DIET DYSPHAGIA MINCED AND MOIST; Dysphagia Minced and Moist; No Drinking Straw  CODE: Limited    Intake/Output Summary (Last 24 hours) at 2020 1018  Last data filed at 2020 0403  Gross per 24 hour   Intake 0 ml   Output 550 ml   Net -550 ml       Review of Systems  Unable to be obtained      OBJECTIVE:    /60   Pulse 104   Temp 97.1 °F (36.2 °C) (Axillary)   Resp 18   Ht 5' 4\" (1.626 m)   Wt 115 lb (52.2 kg)   SpO2 90%   BMI 19.74 kg/m²     General appearance: More active and speaking today, NG in place, 2L NC  HEENT:  Conjunctivae/corneas clear. Neck: Supple. No jugular venous distention.    Respiratory: symmetrical; diminished bilaterally; no wheezes; no rhonchi; no rales  Cardiovascular: rhythm regular; rate controlled; no murmurs  Abdomen: Soft, nontender, nondistended  Extremities: CO2 24 24 26   BUN 21 27* 28*   CREATININE 0.4* 0.4* 0.4*   CALCIUM 8.1* 8.3* 8.2*       Recent Labs     11/10/20  0245 11/11/20  0246 11/12/20  0400   PROT 5.2* 5.3* 5.5*   ALKPHOS 47 51 61   ALT 8 10 11   AST 20 20 20   BILITOT 0.4 0.4 0.8       Recent Labs     11/10/20  0245 11/11/20  0246 11/12/20  0400   INR 1.0 1.0 1.0       No results for input(s): Josue Perrin in the last 72 hours. Chronic labs:    Lab Results   Component Value Date    CHOL 149 05/03/2013    TRIG 69 05/03/2013    HDL 56.0 05/03/2013    LDLCALC 79 05/03/2013    TSH 1.230 08/08/2015    INR 1.0 11/12/2020    LABA1C 5.9 (H) 11/03/2020       Radiology: REVIEWED DAILY    +++++++++++++++++++++++++++++++++++++++++++++++++  Sergey Sims Physician - 2020 [facility], [place]  +++++++++++++++++++++++++++++++++++++++++++++++++  NOTE: This report was transcribed using voice recognition software. Every effort was made to ensure accuracy; however, inadvertent computerized transcription errors may be present.

## 2020-11-12 NOTE — CARE COORDINATION
Care Coordination:  Left another message for Carolee Harrison in admissions at [facility] regarding acceptance.  Awaiting return call    Micah Mendez

## 2020-11-12 NOTE — PROGRESS NOTES
[facility]  Department of Internal Medicine  Division of Pulmonary, Critical Care and Sleep Medicine  Progress Note    Mohit Malhotra DO, Michael Loyola, Jamilah Willis MD, [facility]    Patient: Jeff Pena  MRN: [id number]  : 1925    Encounter Time: 12:57 PM     Date of Admission: 2020  4:19 AM    Primary Care Physician: Neymar Conrad MD    Reason for Consultation: CoVID 19       SUBJECTIVE:   On/off Oxygen at 2 liters  Core pack, and psych meds restarted on TF  BS noted  Now negative (-) 3.3 liters      OBJECTIVE:     PHYSICAL EXAM:   VITALS:     Intake/Output Summary (Last 24 hours) at 2020 1257  Last data filed at 2020 0800  Gross per 24 hour   Intake 0 ml   Output 550 ml   Net -550 ml        CONSTITUTIONAL:   Alert   SKIN:     Mild skin discoloration  HEENT:     No thrush  NECK:    No bruits, No JVP apprechiated  CV:      AS  murmur, No rubs, No gallops  PULMONARY:   Couse BS,  No Wheezing, No Rales, No Rhonchi      No noted egophony  ABDOMEN:     Soft, non-tender. BS normal. No R/R/G  EXT:    No deformities . No clubbing. trace lower extremity edema, No venous stasis  PULSE:   Appears equal and palpable.   PSYCHIATRIC:  No acute psycosis  MS:    Gross weakness  NEUROLOGIC:   The clinical assessment is non-focal     DATA: IMAGING & TESTING:     LABORATORY TESTS:    CBC:   Lab Results   Component Value Date    WBC 11.7 2020    RBC 4.64 2020    HGB 12.6 2020    HCT 38.2 2020    MCV 82.3 2020    MCH 27.2 2020    MCHC 33.0 2020    RDW 15.8 2020     2020    MPV 10.9 2020     CMP:    Lab Results   Component Value Date     2020    K 4.3 2020    K 3.5 2020    CL 99 2020    CO2 26 2020    BUN 28 2020    CREATININE 0.4 2020    GFRAA >60 2020    LABGLOM >60 2020    GLUCOSE 74 2020    GLUCOSE 87 04/10/2012    PROT 5.5 11/12/2020    LABALBU 2.6 11/12/2020    LABALBU 4.1 04/10/2012    CALCIUM 8.2 11/12/2020    BILITOT 0.8 11/12/2020    ALKPHOS 61 11/12/2020    AST 20 11/12/2020    ALT 11 11/12/2020     PT/INR:    Lab Results   Component Value Date    PROTIME 11.3 11/12/2020    INR 1.0 11/12/2020     ABG:  No results found for: PH, PCO2, PO2, HCO3, BE, THGB, TCO2, O2SAT  TSH:    Lab Results   Component Value Date    TSH 1.230 08/08/2015     FERRITIN:    Lab Results   Component Value Date    FERRITIN 999 11/01/2020     Fibrinogen Level:  No components found for: FIB     PRO-BNP: No results found for: PROBNP   ABGs: No results found for: PH, PO2, PCO2  Hemoglobin A1C: No components found for: HGBA1C    IMAGING:  Imaging tests were completed and reviewed and discussed radiology and care team involved and reveals   Xr Chest Portable    Result Date: 11/2/2020  EXAMINATION: ONE XRAY VIEW OF THE CHEST 11/2/2020 9:29 am COMPARISON: November 1, 2020; October 21, 2015 HISTORY: ORDERING SYSTEM PROVIDED HISTORY: compare to previous, sob TECHNOLOGIST PROVIDED HISTORY: Reason for exam:->compare to previous, sob What reading provider will be dictating this exam?->CRC FINDINGS: Heart size is unable to be accurately assessed on this single portable view of the chest, but appears to be stable. There are new hazy and patchy airspace opacities throughout the right lung, predominantly affecting the mid and upper lung zones. Difficult to exclude small right pleural effusion. The left lung is clear. Bones are diffusely osteopenic. 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 This is unchanged dating back to 2015. New airspace opacities throughout the right lung suggestive of aspiration/pneumonia. Asymmetric pulmonary edema could have a similar appearance.      Xr Chest Portable    Result Date: 11/1/2020  EXAMINATION: ONE XRAY VIEW OF THE CHEST 11/1/2020 5:07 am COMPARISON: 10/21/2015 HISTORY: [address] Bose PROVIDED HISTORY: chest pain TECHNOLOGIST PROVIDED HISTORY: Reason for exam:->chest pain What reading provider will be dictating this exam?->CRC FINDINGS: There are bilateral infiltrates with predominantly peripheral distribution. There is no pleural effusion or pneumothorax seen. There is no cardiomegaly or pulmonary vascular congestion. There are old right-sided rib fractures. 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 Bilateral infiltrates. Assessment:   1. COVID 19 pneumonia   2. Respiratory failure ( oxygen saturation 93 % on 8 L of high flow oxygen )   3. Leukocytosis, elevated procalcitonin, hyperferritinemia   4. Hyperglycemia  5. Dementia  6. Lumbar and cervical disc disease            Plan:   1. Titrate oxygen if able  2. NPO aspiration assessment noted, PEG tube requested now with Core tract  3. HOB elevated at all time  4. COmbivent QID for 6 weeks  5.  Discharge planning    Antonella Hyman, MPH, [facility]  Professor of Medicine  Pulmonary, Critical Care and Sleep Medicine

## 2020-11-12 NOTE — PLAN OF CARE
Problem: Skin Integrity:  Goal: Will show no infection signs and symptoms  Description: Will show no infection signs and symptoms  Outcome: Met This Shift  Goal: Absence of new skin breakdown  Description: Absence of new skin breakdown  Outcome: Met This Shift     Problem: Falls - Risk of:  Goal: Will remain free from falls  Description: Will remain free from falls  Outcome: Met This Shift  Goal: Absence of physical injury  Description: Absence of physical injury  Outcome: Met This Shift     Problem: Confusion - Acute:  Goal: Absence of continued neurological deterioration signs and symptoms  Description: Absence of continued neurological deterioration signs and symptoms  Outcome: Met This Shift  Goal: Mental status will be restored to baseline  Description: Mental status will be restored to baseline  Outcome: Met This Shift

## 2020-11-12 NOTE — CARE COORDINATION
Care Coordination: Spoke to Aleksandar Martinez at [facility], refaxed updated ptx otx and speech.   She will discuss in meeting and if can take, will initiate precert    Shweta Mehta

## 2020-11-12 NOTE — CONSULTS
Nutrition Assessment     Type and Reason for Visit: Consult    Nutrition Recommendations/Plan: Continue Current Diet, Start Oral Nutrition Supplement - Ensure Plant BID, Orion BID    Nutrition Assessment:  Consult noted for poor intake, pt passed swallow eval w/ diet advanced and corpak removed. Will add plant-based ONS BID 2/2 noted milk allergy to optimize nutrition. Will continue to monitor.     Electronically signed by Natalie Pham, MS, RD, LD on 11/12/20 at 3:36 PM EST    Contact: [phone]

## 2020-11-13 VITALS
DIASTOLIC BLOOD PRESSURE: 60 MMHG | HEART RATE: 118 BPM | WEIGHT: 115 LBS | BODY MASS INDEX: 19.63 KG/M2 | SYSTOLIC BLOOD PRESSURE: 128 MMHG | OXYGEN SATURATION: 91 % | RESPIRATION RATE: 18 BRPM | HEIGHT: 64 IN | TEMPERATURE: 96.9 F

## 2020-11-13 LAB
ALBUMIN SERPL-MCNC: 2.4 G/DL (ref 3.5–5.2)
ALP BLD-CCNC: 56 U/L (ref 35–104)
ALT SERPL-CCNC: 11 U/L (ref 0–32)
ANION GAP SERPL CALCULATED.3IONS-SCNC: 11 MMOL/L (ref 7–16)
ANISOCYTOSIS: ABNORMAL
APTT: 28.9 SEC (ref 24.5–35.1)
AST SERPL-CCNC: 20 U/L (ref 0–31)
BASOPHILS ABSOLUTE: 0 E9/L (ref 0–0.2)
BASOPHILS RELATIVE PERCENT: 0.3 % (ref 0–2)
BILIRUB SERPL-MCNC: 0.9 MG/DL (ref 0–1.2)
BUN BLDV-MCNC: 25 MG/DL (ref 8–23)
CALCIUM SERPL-MCNC: 8.2 MG/DL (ref 8.6–10.2)
CHLORIDE BLD-SCNC: 101 MMOL/L (ref 98–107)
CO2: 21 MMOL/L (ref 22–29)
CREAT SERPL-MCNC: 0.5 MG/DL (ref 0.5–1)
EOSINOPHILS ABSOLUTE: 0 E9/L (ref 0.05–0.5)
EOSINOPHILS RELATIVE PERCENT: 1.3 % (ref 0–6)
FIBRINOGEN: 673 MG/DL (ref 225–540)
GFR AFRICAN AMERICAN: >60
GFR NON-AFRICAN AMERICAN: >60 ML/MIN/1.73
GLUCOSE BLD-MCNC: 80 MG/DL (ref 74–99)
HCT VFR BLD CALC: 35.4 % (ref 34–48)
HEMOGLOBIN: 11.6 G/DL (ref 11.5–15.5)
INR BLD: 1.2
LYMPHOCYTES ABSOLUTE: 0.43 E9/L (ref 1.5–4)
LYMPHOCYTES RELATIVE PERCENT: 3.5 % (ref 20–42)
MCH RBC QN AUTO: 26.7 PG (ref 26–35)
MCHC RBC AUTO-ENTMCNC: 32.8 % (ref 32–34.5)
MCV RBC AUTO: 81.4 FL (ref 80–99.9)
METAMYELOCYTES RELATIVE PERCENT: 3.5 % (ref 0–1)
METER GLUCOSE: 82 MG/DL (ref 74–99)
MONOCYTES ABSOLUTE: 0.64 E9/L (ref 0.1–0.95)
MONOCYTES RELATIVE PERCENT: 6.1 % (ref 2–12)
MYELOCYTE PERCENT: 3.5 % (ref 0–0)
NEUTROPHILS ABSOLUTE: 9.74 E9/L (ref 1.8–7.3)
NEUTROPHILS RELATIVE PERCENT: 83.5 % (ref 43–80)
PDW BLD-RTO: 16 FL (ref 11.5–15)
PLATELET # BLD: 365 E9/L (ref 130–450)
PMV BLD AUTO: 10.3 FL (ref 7–12)
POLYCHROMASIA: ABNORMAL
POTASSIUM SERPL-SCNC: 4.1 MMOL/L (ref 3.5–5)
PROTHROMBIN TIME: 13.1 SEC (ref 9.3–12.4)
RBC # BLD: 4.35 E12/L (ref 3.5–5.5)
SODIUM BLD-SCNC: 133 MMOL/L (ref 132–146)
TOTAL PROTEIN: 5.2 G/DL (ref 6.4–8.3)
WBC # BLD: 10.7 E9/L (ref 4.5–11.5)

## 2020-11-13 PROCEDURE — 6370000000 HC RX 637 (ALT 250 FOR IP): Performed by: INTERNAL MEDICINE

## 2020-11-13 PROCEDURE — 99232 SBSQ HOSP IP/OBS MODERATE 35: CPT | Performed by: INTERNAL MEDICINE

## 2020-11-13 PROCEDURE — 80053 COMPREHEN METABOLIC PANEL: CPT

## 2020-11-13 PROCEDURE — 85730 THROMBOPLASTIN TIME PARTIAL: CPT

## 2020-11-13 PROCEDURE — 85025 COMPLETE CBC W/AUTO DIFF WBC: CPT

## 2020-11-13 PROCEDURE — 85384 FIBRINOGEN ACTIVITY: CPT

## 2020-11-13 PROCEDURE — 82962 GLUCOSE BLOOD TEST: CPT

## 2020-11-13 PROCEDURE — 85610 PROTHROMBIN TIME: CPT

## 2020-11-13 PROCEDURE — 6360000002 HC RX W HCPCS: Performed by: INTERNAL MEDICINE

## 2020-11-13 PROCEDURE — 2700000000 HC OXYGEN THERAPY PER DAY

## 2020-11-13 RX ORDER — PETROLATUM 42 G/100G
OINTMENT TOPICAL 2 TIMES DAILY
Refills: 0 | DISCHARGE
Start: 2020-11-13 | End: 2021-02-02 | Stop reason: ALTCHOICE

## 2020-11-13 RX ORDER — PETROLATUM 42 G/100G
OINTMENT TOPICAL
Refills: 0 | DISCHARGE
Start: 2020-11-13 | End: 2020-11-13

## 2020-11-13 RX ORDER — METOPROLOL SUCCINATE 50 MG/1
50 TABLET, EXTENDED RELEASE ORAL 2 TIMES DAILY
Status: DISCONTINUED | OUTPATIENT
Start: 2020-11-13 | End: 2020-11-14 | Stop reason: HOSPADM

## 2020-11-13 RX ORDER — PETROLATUM 42 G/100G
OINTMENT TOPICAL
Refills: 0 | DISCHARGE
Start: 2020-11-13 | End: 2021-02-02 | Stop reason: ALTCHOICE

## 2020-11-13 RX ADMIN — QUETIAPINE FUMARATE 25 MG: 25 TABLET ORAL at 08:32

## 2020-11-13 RX ADMIN — DONEPEZIL HYDROCHLORIDE 5 MG: 5 TABLET, FILM COATED ORAL at 08:32

## 2020-11-13 RX ADMIN — HEPARIN SODIUM 5000 UNITS: 10000 INJECTION INTRAVENOUS; SUBCUTANEOUS at 05:49

## 2020-11-13 RX ADMIN — MEMANTINE HYDROCHLORIDE 10 MG: 10 TABLET, FILM COATED ORAL at 08:32

## 2020-11-13 RX ADMIN — DIVALPROEX SODIUM 125 MG: 125 CAPSULE, COATED PELLETS ORAL at 08:32

## 2020-11-13 RX ADMIN — HEPARIN SODIUM 5000 UNITS: 10000 INJECTION INTRAVENOUS; SUBCUTANEOUS at 14:47

## 2020-11-13 RX ADMIN — METOPROLOL SUCCINATE 50 MG: 50 TABLET, EXTENDED RELEASE ORAL at 14:38

## 2020-11-13 ASSESSMENT — PAIN SCALES - GENERAL: PAINLEVEL_OUTOF10: 0

## 2020-11-13 NOTE — PLAN OF CARE
Problem: Skin Integrity:  Goal: Will show no infection signs and symptoms  Description: Will show no infection signs and symptoms  Outcome: Not Met This Shift  Goal: Absence of new skin breakdown  Description: Absence of new skin breakdown  Outcome: Not Met This Shift     Problem: Falls - Risk of:  Goal: Will remain free from falls  Description: Will remain free from falls  Outcome: Not Met This Shift  Goal: Absence of physical injury  Description: Absence of physical injury  Outcome: Not Met This Shift     Problem: Confusion - Acute:  Goal: Absence of continued neurological deterioration signs and symptoms  Description: Absence of continued neurological deterioration signs and symptoms  Outcome: Not Met This Shift  Goal: Mental status will be restored to baseline  Description: Mental status will be restored to baseline  Outcome: Not Met This Shift     Problem: Discharge Planning:  Goal: Ability to perform activities of daily living will improve  Description: Ability to perform activities of daily living will improve  Outcome: Not Met This Shift  Goal: Participates in care planning  Description: Participates in care planning  Outcome: Not Met This Shift     Problem: Injury - Risk of, Physical Injury:  Goal: Will remain free from falls  Description: Will remain free from falls  Outcome: Not Met This Shift  Goal: Absence of physical injury  Description: Absence of physical injury  Outcome: Not Met This Shift     Problem: Mood - Altered:  Goal: Mood stable  Description: Mood stable  Outcome: Not Met This Shift  Goal: Absence of abusive behavior  Description: Absence of abusive behavior  Outcome: Not Met This Shift  Goal: Verbalizations of feeling emotionally comfortable while being cared for will increase  Description: Verbalizations of feeling emotionally comfortable while being cared for will increase  Outcome: Not Met This Shift     Problem: Psychomotor Activity - Altered:  Goal: Absence of psychomotor disturbance signs and symptoms  Description: Absence of psychomotor disturbance signs and symptoms  Outcome: Not Met This Shift     Problem: Sensory Perception - Impaired:  Goal: Demonstrations of improved sensory functioning will increase  Description: Demonstrations of improved sensory functioning will increase  Outcome: Not Met This Shift  Goal: Decrease in sensory misperception frequency  Description: Decrease in sensory misperception frequency  Outcome: Not Met This Shift  Goal: Able to refrain from responding to false sensory perceptions  Description: Able to refrain from responding to false sensory perceptions  Outcome: Not Met This Shift  Goal: Demonstrates accurate environmental perceptions  Description: Demonstrates accurate environmental perceptions  Outcome: Not Met This Shift  Goal: Able to distinguish between reality-based and nonreality-based thinking  Description: Able to distinguish between reality-based and nonreality-based thinking  Outcome: Not Met This Shift  Goal: Able to interrupt nonreality-based thinking  Description: Able to interrupt nonreality-based thinking  Outcome: Not Met This Shift     Problem: Sleep Pattern Disturbance:  Goal: Appears well-rested  Description: Appears well-rested  Outcome: Not Met This Shift     Problem: Pain:  Goal: Pain level will decrease  Description: Pain level will decrease  Outcome: Not Met This Shift  Goal: Control of acute pain  Description: Control of acute pain  Outcome: Not Met This Shift  Goal: Control of chronic pain  Description: Control of chronic pain  Outcome: Not Met This Shift     Problem: Airway Clearance - Ineffective  Goal: Achieve or maintain patent airway  Outcome: Not Met This Shift     Problem: Gas Exchange - Impaired  Goal: Absence of hypoxia  Outcome: Not Met This Shift  Goal: Promote optimal lung function  Outcome: Not Met This Shift     Problem: Breathing Pattern - Ineffective  Goal: Ability to achieve and maintain a regular respiratory rate  Outcome: Not Met This Shift     Problem:  Body Temperature -  Risk of, Imbalanced  Goal: Ability to maintain a body temperature within defined limits  Outcome: Not Met This Shift  Goal: Will regain or maintain usual level of consciousness  Outcome: Not Met This Shift  Goal: Complications related to the disease process, condition or treatment will be avoided or minimized  Outcome: Not Met This Shift     Problem: Isolation Precautions - Risk of Spread of Infection  Goal: Prevent transmission of infection  Outcome: Not Met This Shift     Problem: Nutrition Deficits  Goal: Optimize nutrtional status  Outcome: Not Met This Shift     Problem: Risk for Fluid Volume Deficit  Goal: Maintain normal heart rhythm  Outcome: Not Met This Shift  Goal: Maintain absence of muscle cramping  Outcome: Not Met This Shift  Goal: Maintain normal serum potassium, sodium, calcium, phosphorus, and pH  Outcome: Not Met This Shift     Problem: Loneliness or Risk for Loneliness  Goal: Demonstrate positive use of time alone when socialization is not possible  Outcome: Not Met This Shift     Problem: Fatigue  Goal: Verbalize increase energy and improved vitality  Outcome: Not Met This Shift     Problem: Patient Education: Go to Patient Education Activity  Goal: Patient/Family Education  Outcome: Not Met This Shift

## 2020-11-13 NOTE — PROGRESS NOTES
BONIFACIO Huitron DO        guaiFENesin-dextromethorphan (ROBITUSSIN DM) 100-10 MG/5ML syrup 5 mL  5 mL Oral Q4H PRN Rommel Huitron DO   5 mL at 11/08/20 0851    0.9 % sodium chloride bolus  30 mL Intravenous PRN Eric Evans MD        latanoprost (XALATAN) 0.005 % ophthalmic solution 1 drop  1 drop Both Eyes Nightly [place] BONIFACIO Huitron DO   1 drop at 11/12/20 2150    bisacodyl (DULCOLAX) suppository 10 mg  10 mg Rectal PRN Genet Vicente DO           PHYSICAL EXAM:      Vitals:     BP (!) 150/70   Pulse 105   Temp 96.9 °F (36.1 °C) (Temporal)   Resp 18   Ht 5' 4\" (1.626 m)   Wt 115 lb (52.2 kg)   SpO2 91%   BMI 19.74 kg/m²     General Appearance:     More awake and alert . Head:    Normocephalic, atraumatic   Eyes:    No pallor, no icterus,   Ears:    No obvious deformity or drainage.    Nose:   No nasal drainage   Throat:  Dry oral mucosa    Neck:   Supple, no lymphadenopathy   Lungs:     Diminished breath sound    Heart:     Regular     Abdomen:     Soft, non-tender, bowel sounds present    Extremities:   No edema, no cyanosis   Pulses:   Dorsalis pedis palpable    Skin:   No rash       Lab Results   Component Value Date    WBC 10.7 11/13/2020    RBC 4.35 11/13/2020    HGB 11.6 11/13/2020    HCT 35.4 11/13/2020     11/13/2020    MCV 81.4 11/13/2020    MCH 26.7 11/13/2020    MCHC 32.8 11/13/2020    RDW 16.0 11/13/2020    NRBC 1.0 11/12/2020    SEGSPCT 58 05/06/2013    METASPCT 3.5 11/13/2020    LYMPHOPCT 3.5 11/13/2020    PROMYELOPCT 4.0 11/12/2020    MONOPCT 6.1 11/13/2020    MYELOPCT 3.5 11/13/2020    BASOPCT 0.3 11/13/2020    MONOSABS 0.64 11/13/2020    LYMPHSABS 0.43 11/13/2020    EOSABS 0.00 11/13/2020    BASOSABS 0.00 11/13/2020       CMP     Lab Results   Component Value Date     11/13/2020    K 4.1 11/13/2020    K 3.5 11/03/2020     11/13/2020    CO2 21 11/13/2020    BUN 25 11/13/2020    CREATININE 0.5 11/13/2020    GFRAA >60 11/13/2020    LABGLOM >60 11/13/2020    GLUCOSE

## 2020-11-13 NOTE — CARE COORDINATION
Care Coordination:  Left message for daughter ronal for tentative dc to [facility] at 6 pm .   I have faxed dc summary to [facility] in [facility] [address] as requested to [address] [place] [address]

## 2020-11-13 NOTE — CARE COORDINATION
Care Coordination: Spoke to Domenico Fournier at [facility]. precert pending but she is calling [facility] now to see if she can facilitate. I have tentatively set up for 6 pm time with Sonam Prasad at physicians if precert obtained    Kiko Jon    Addendum: on hold for long period of time on [place] and then disconnected multiple times attempting to check on precert.     Kiko Jon

## 2020-11-13 NOTE — DISCHARGE SUMMARY
Discharge Summary    Admit date: 11/1/2020    Discharge date and time: No discharge date for patient encounter. Admitting Physician: Vince Rhodes DO     Consultants: Infectious disease, general surgery, pulmonology    Admission Diagnoses:  COVID-19    Discharge Diagnoses and hospital course:   · Acute hypoxic respiratory failure secondary to COVID-19 viral pneumonia-infectious disease following.  Finished remdesivir/decadron. · Acute kidney injury, resolved  · Hypernatremia, resolved  · Elevated troponin in the setting of acute kidney injury  · Dysphagia- Patient initially failed swallow study, had CorePak. Family wanted to pursue PEG. General surgery followed, however due to respiratory status this was temporarily on hold. Now that she is more awake, passed swallow study for minced and moist.  Removed CorePak. Patient will need assistance with eating. · Dementia    Discharge Exam:   Vitals:    11/13/20 1438   BP: 128/60   Pulse: 118   Resp:    Temp:    SpO2:        General appearance: More active and speaking today, 2L NC  HEENT:  Conjunctivae/corneas clear. Neck: Supple. No jugular venous distention. Respiratory: symmetrical; diminished bilaterally; no wheezes; no rhonchi; no rales  Cardiovascular: rhythm regular; rate controlled; no murmurs  Abdomen: Soft, nontender, nondistended  Extremities:  peripheral pulses present; no peripheral edema; no ulcers  Musculoskeletal: No clubbing, cyanosis, no bilateral lower extremity edema. Brisk capillary refill. Skin:  No rashes  on visible skin  Neurologic: awake, alert and following commands        Disposition: SNF-- [facility]  The patient's condition is fair. At this time the patient is without objective evidence of an acute process requiring continuing hospitalization or inpatient management. They are stable for discharge with outpatient follow-up.      I have spoken with the patient and discussed the results of the current hospitalization, in addition to providing specific details for the plan of care and counseling regarding the diagnosis and prognosis. The plan has been discussed in detail and they are aware of the specific conditions for emergent return, as well as the importance of follow-up. Their questions are answered at this time and they are agreeable with the plan for discharge to [facility]     Patient Instructions: Follow up with PCP within 1 week. Patient will need assistance with eating and calorie counts. BMP q3 days monitor for dehydration    Discharge Medications:     Medication List      START taking these medications    albuterol-ipratropium  MCG/ACT Aers inhaler  Commonly known as:  COMBIVENT RESPIMAT  Inhale 1 puff into the lungs every 6 hours     miconazole 2 % powder  Commonly known as:  MICOTIN  Apply topically 2 times daily. * mineral oil-hydrophilic petrolatum ointment  Apply topically as needed. * mineral oil-hydrophilic petrolatum ointment  Apply topically as needed. * This list has 2 medication(s) that are the same as other medications prescribed for you. Read the directions carefully, and ask your doctor or other care provider to review them with you. CHANGE how you take these medications    pantoprazole 40 MG tablet  Commonly known as:  PROTONIX  What changed:  Another medication with the same name was removed. Continue taking this medication, and follow the directions you see here. QUEtiapine 25 MG tablet  Commonly known as:  SEROQUEL  What changed:  Another medication with the same name was removed. Continue taking this medication, and follow the directions you see here.         CONTINUE taking these medications    acetaminophen 500 MG tablet  Commonly known as:  TYLENOL  Take 1 tablet by mouth every 4 hours as needed     amLODIPine 2.5 MG tablet  Commonly known as:  NORVASC     aspirin-acetaminophen-caffeine 385-795-50 MG per tablet  Commonly known as:  Zia Heranndez coenzyme Q10 100 MG Caps capsule     Cranberry 250 MG Tabs     diclofenac sodium 1 % Gel  Commonly known as:  VOLTAREN     divalproex 125 MG DR tablet  Commonly known as:  DEPAKOTE     donepezil 5 MG disintegrating tablet  Commonly known as:  ARICEPT ODT     hydrOXYzine 25 MG capsule  Commonly known as:  VISTARIL     lactase 3000 units tablet  Commonly known as:  LACTAID     latanoprost 0.005 % ophthalmic solution  Commonly known as:  XALATAN     LUTEIN PO     melatonin 5 MG Tabs tablet     memantine ER 14 MG Cp24 extended release capsule  Commonly known as:  NAMENDA XR     metoprolol succinate 50 MG extended release tablet  Commonly known as:  TOPROL XL     Omega 3 1000 MG Caps     polyethylene glycol 17 g packet  Commonly known as:  GLYCOLAX     RA CALCIUM PLUS MINERALS/VIT D PO     RESVERATROL PO     Turmeric 500 MG Caps        STOP taking these medications    aspirin 81 MG tablet     bisacodyl 10 MG suppository  Commonly known as:  DULCOLAX     calcium-vitamin D 500-200 MG-UNIT per tablet  Commonly known as:  OSCAL-500     celecoxib 200 MG capsule  Commonly known as:  CELEBREX     ferrous sulfate 325 (65 Fe) MG tablet  Commonly known as:  IRON 325     furosemide 20 MG tablet  Commonly known as:  LASIX     haloperidol lactate 5 MG/ML injection  Commonly known as:  HALDOL     KLOR-CON PO     LUMIGAN OP     magnesium hydroxide 400 MG/5ML suspension  Commonly known as:  MILK OF MAGNESIA     NONFORMULARY     Probiotic + Omega-3 Caps     sucralfate 1 GM tablet  Commonly known as:  CARAFATE     therapeutic multivitamin-minerals tablet     traMADol 50 MG tablet  Commonly known as:  ULTRAM     vitamin C 250 MG tablet     zolpidem 5 MG tablet  Commonly known as:  AMBIEN           Where to Get Your Medications      Information about where to get these medications is not yet available    Ask your nurse or doctor about these medications  · albuterol-ipratropium  MCG/ACT Aers inhaler  · miconazole 2 % powder  · mineral oil-hydrophilic petrolatum ointment  · mineral oil-hydrophilic petrolatum ointment         Activity: activity as tolerated    Diet: Dysphagia minced and moist- no drinking straw. + wound healing oral supplement twice daily with breakfast and dinner. + Ensure plant twice daily with lunch and dinner    Wound Care: as directed    Follow-up:    · This patient is instructed to follow-up with her primary care physician. · Patient is instructed to follow-up with the consults listed above as directed by them. · They are instructed to resume home medications and take new medications as indicated in the list above. · If the patient has a recurrence of symptoms, they are instructed to go to the ED. Preparing for this patient's discharge, including paperwork, orders, instructions, and meeting with patient did require > 30 minutes.     Ti Miller DO   2:43 PM  11/13/2020

## 2020-11-13 NOTE — PROGRESS NOTES
[facility]  Department of Internal Medicine  Division of Pulmonary, Critical Care and Sleep Medicine  Progress Note    Joe Holguin DO, Santa Gonzalez MD, [facility]    Patient: Adolph Ortiz  MRN: [id number]  : 1925    Encounter Time: 2:20 PM     Date of Admission: 2020  4:19 AM    Primary Care Physician: Payton Diaz MD    Reason for Consultation: CoVID 19       SUBJECTIVE:   On/off Oxygen at 2 liters  Core pack, and psych meds restarted on TF  BS noted  Now negative (-) 3.3 liters      OBJECTIVE:     PHYSICAL EXAM:   VITALS:     Intake/Output Summary (Last 24 hours) at 2020 1420  Last data filed at 2020 1256  Gross per 24 hour   Intake 125 ml   Output 750 ml   Net -625 ml        CONSTITUTIONAL:   Alert   SKIN:     Mild skin discoloration  HEENT:     No thrush  NECK:    No bruits, No JVP apprechiated  CV:      AS  murmur, No rubs, No gallops  PULMONARY:   Couse BS,  No Wheezing, No Rales, No Rhonchi      No noted egophony  ABDOMEN:     Soft, non-tender. BS normal. No R/R/G  EXT:    No deformities . No clubbing. trace lower extremity edema, No venous stasis  PULSE:   Appears equal and palpable.   PSYCHIATRIC:  No acute psycosis  MS:    Gross weakness  NEUROLOGIC:   The clinical assessment is non-focal     DATA: IMAGING & TESTING:     LABORATORY TESTS:    CBC:   Lab Results   Component Value Date    WBC 10.7 2020    RBC 4.35 2020    HGB 11.6 2020    HCT 35.4 2020    MCV 81.4 2020    MCH 26.7 2020    MCHC 32.8 2020    RDW 16.0 2020     2020    MPV 10.3 2020     CMP:    Lab Results   Component Value Date     2020    K 4.1 2020    K 3.5 2020     2020    CO2 21 2020    BUN 25 2020    CREATININE 0.5 2020    GFRAA >60 2020    LABGLOM >60 2020    GLUCOSE 80 2020    GLUCOSE 87 04/10/2012 PROT 5.2 11/13/2020    LABALBU 2.4 11/13/2020    LABALBU 4.1 04/10/2012    CALCIUM 8.2 11/13/2020    BILITOT 0.9 11/13/2020    ALKPHOS 56 11/13/2020    AST 20 11/13/2020    ALT 11 11/13/2020     PT/INR:    Lab Results   Component Value Date    PROTIME 13.1 11/13/2020    INR 1.2 11/13/2020     ABG:  No results found for: PH, PCO2, PO2, HCO3, BE, THGB, TCO2, O2SAT  TSH:    Lab Results   Component Value Date    TSH 1.230 08/08/2015     FERRITIN:    Lab Results   Component Value Date    FERRITIN 999 11/01/2020     Fibrinogen Level:  No components found for: FIB     PRO-BNP: No results found for: PROBNP   ABGs: No results found for: PH, PO2, PCO2  Hemoglobin A1C: No components found for: HGBA1C    IMAGING:  Imaging tests were completed and reviewed and discussed radiology and care team involved and reveals   Xr Chest Portable    Result Date: 11/2/2020  EXAMINATION: ONE XRAY VIEW OF THE CHEST 11/2/2020 9:29 am COMPARISON: November 1, 2020; October 21, 2015 HISTORY: ORDERING SYSTEM PROVIDED HISTORY: compare to previous, sob TECHNOLOGIST PROVIDED HISTORY: Reason for exam:->compare to previous, sob What reading provider will be dictating this exam?->CRC FINDINGS: Heart size is unable to be accurately assessed on this single portable view of the chest, but appears to be stable. There are new hazy and patchy airspace opacities throughout the right lung, predominantly affecting the mid and upper lung zones. Difficult to exclude small right pleural effusion. The left lung is clear. Bones are diffusely osteopenic. 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 This is unchanged dating back to 2015. New airspace opacities throughout the right lung suggestive of aspiration/pneumonia. Asymmetric pulmonary edema could have a similar appearance.      Xr Chest Portable    Result Date: 11/1/2020  EXAMINATION: ONE XRAY VIEW OF THE CHEST 11/1/2020 5:07 am COMPARISON: 10/21/2015 HISTORY: [address]

## 2020-11-13 NOTE — PROGRESS NOTES
CLINICAL PHARMACY: DISCHARGE MED RECONCILIATION/REVIEW    [facility]) Select Patient?: No  Total # of Interventions Recommended: 0   -   Total # Interventions Accepted: 0  Intervention Severity:   - Level 1 Intervention Present?: No   - Level 2 #: 0   - Level 3 #: 0   Time Spent (min): 15    Additional Documentation: See progress note.

## 2020-11-13 NOTE — DISCHARGE INSTR - COC
Continuity of Care Form    Patient Name: Edelmira Tidwell   :  1925  MRN:  [id number]    Admit date:  2020  Discharge date:  2020    Code Status Order: Limited   Advance Directives:   [facility] Documentation       Date/Time Healthcare Directive Type of Healthcare Directive Copy in [address] Agent's Name Healthcare Agent's Phone Number    [phone]  Yes, patient has an advance directive for healthcare treatment  Durable power of  for health care  Yes, copy in chart  Adult [address]   --            Admitting Physician:  Genet Vicente DO  PCP: Yarelis Melton MD    Discharging Nurse: [facility] Unit/Room#: 9731/4154-R  Discharging Unit Phone Number: [phone]    Emergency Contact:   Extended Emergency Contact Information  Primary Emergency Contact: Navi Saini  [place] Phone: [phone]  Relation: Child  Secondary Emergency Contact: [address] [facility] Phone: [phone]  Mobile Phone: [phone]  Relation: Child    Past Surgical History:  Past Surgical History:   Procedure Laterality Date    EYE SURGERY      cataracts    JOINT REPLACEMENT Left     OTHER SURGICAL HISTORY Right 2015    IM nailing right femor       Immunization History:   Immunization History   Administered Date(s) Administered    PPD Test 10/06/2015       Active Problems:  Patient Active Problem List   Diagnosis Code    D[facility] venous thrombosis) [facility] I82.409    Normocytic anemia D64.9    Rheumatoid arthritis ([facility] [address] M06.9    Cervical spondylosis M47.812    Lumbar spondylosis M47.816    Shoulder joint dysfunction M25.9    DDD (degenerative disc disease) [id number]    Spinal stenosis M48.00    Vertebral compression fracture  M48.50XA    Bulging lumbar disc M51.26    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    Dementia associated with other underlying disease with behavioral disturbance ([facility]) F02.81    COVID-19 U07.1    Prediabetes R73.03 Isolation/Infection:   Isolation            Droplet Plus          Patient Infection Status       Infection Onset Added Last Indicated Last Indicated By Review Planned Expiration Resolved Resolved By    COVID-19  11/03/20 11/03/20 Kylie Villela RN 11/10/20 11/17/20      Resolved    C-diff Rule Out 11/04/20 11/04/20 11/04/20 CLOSTRIDIUM DIFFICILE EIA (Ordered)   11/04/20 Rule-Out Test Resulted            Nurse Assessment:  Last Vital Signs: BP (!) 150/70   Pulse 105   Temp 96.9 °F (36.1 °C) (Temporal)   Resp 18   Ht 5' 4\" (1.626 m)   Wt 115 lb (52.2 kg)   SpO2 91%   BMI 19.74 kg/m²     Last documented pain score (0-10 scale): Pain Level: 0  Last Weight:   Wt Readings from Last 1 Encounters:   11/01/20 115 lb (52.2 kg)     Mental Status:  disoriented    IV Access:  - None    Nursing Mobility/ADLs:  Walking   Dependent  Transfer  Dependent  Bathing  Dependent  Dressing  Dependent  Toileting  Dependent  Feeding  Dependent  Med Admin  Dependent  Med Delivery   crushed and prefers mixed with pudding    Wound Care Documentation and Therapy:  Wound 11/01/20 Elbow Right (Active)   Wound Image   11/05/20 0930   Wound Etiology Pressure Stage  3 11/05/20 0930   Dressing Status Clean;Dry; Intact 11/12/20 1942   Wound Cleansed Cleansed with saline 11/12/20 0545   Dressing/Treatment Other (comment) 11/12/20 1942   Dressing Change Due 11/10/20 11/11/20 0926   Wound Length (cm) 1 cm 11/05/20 0930   Wound Width (cm) 1 cm 11/05/20 0930   Wound Depth (cm) 0.1 cm 11/05/20 0930   Wound Surface Area (cm^2) 1 cm^2 11/05/20 0930   Change in Wound Size % (l*w) 94.44 11/05/20 0930   Wound Volume (cm^3) 0.1 cm^3 11/05/20 0930   Wound Assessment Fibrin 11/12/20 1942   Drainage Amount None 11/12/20 1942   Drainage Description Yellow 11/12/20 1942   Odor None 11/12/20 1942   Arelis-wound Assessment Blanchable erythema 11/12/20 1942   Number of days: 11       Wound 11/01/20 Heel Right;Lateral (Active)   Wound Image   11/05/20 1179 support    Rehab Therapies: {THERAPEUTIC INTERVENTION:0508186864}  Weight Bearing Status/Restrictions: No weight bearing restirctions  Other Medical Equipment (for information only, NOT a DME order):  {EQUIPMENT:[id number]}  Other Treatments: ***    Patient's personal belongings (please select all that are sent with patient):  {[facility] DME Belongings:[id number]:::0}    RN SIGNATURE:  Electronically signed by Miranda De Guzman RN on 11/13/20 at 2:58 PM EST    CASE MANAGEMENT/SOCIAL WORK SECTION    Inpatient Status Date: ***    Readmission Risk Assessment Score:  Readmission Risk              Risk of Unplanned Readmission:        24           Discharging to Facility/ Agency   · Name:   · Address:  · Phone:  · Fax:    Dialysis Facility (if applicable)   · Name:  · Address:  · Dialysis Schedule:  · Phone:  · Fax:    / signature: {Esignature:[id number]:::0}    PHYSICIAN SECTION    Prognosis: Fair    Condition at Discharge: Stable    Rehab Potential (if transferring to Rehab): Fair    Recommended Labs or Other Treatments After Discharge: Follow up with PCP within 1 week. Patient will need assistance with eating and calorie counts. BMP q3 days monitor for dehydration. Physician Certification: I certify the above information and transfer of Wiliam Rowell  is necessary for the continuing treatment of the diagnosis listed and that she requires [facility] for greater 30 days.      Update Admission H&P: No change in H&P    PHYSICIAN SIGNATURE:  Electronically signed by Damian Lehman DO on 11/13/20 at 9:46 AM EST

## 2020-11-13 NOTE — PROGRESS NOTES
[facility]   Department of Pharmacy   Pharmacist Transition of Care Services         Patient Demographics  Name:  Joaquín Lopez   Medical Record Number:  [id number]  Gender:  female   Age:  80 y.o. YOB: 1925    Primary Care Physician: Sean Shahid MD  Primary Care Physician phone number:  [phone]  Readmission Risk (% from [facility] Patient List): 25%       Pharmacist Review and Summary of Medications     Date of last reviewed/update: 11/13/20     Category Comments   New Medication Started   1. Combivent Respimat  2. Hydrophor  3. Miconazole powder     Change in Outpatient Medication 1. Pantoprazole  2. Seroquel     Discontinued/On hold Outpatient Medication  1. Aspirin  2. Dulcolax  3. Oscal  4. Celebrex  5. Ferrous Sulfate  6. Haldol  7. Potassium  8. Lumigan  9. Milk of Mag  10. Probiotic  11. Carafate  12. Multivitamin-minerals  13. Tramadol  14. Vitamin C  15. Ambien    Other          Documentation of Pharmacist Interventions and Follow-up Plan:     The following Pharmacist Transition of Care Services were completed:   [x]  Reviewed and summarized medication changes  []  Entire home medication list was reviewed for accuracy (sources: **)  []  Home medication list was updated or corrected     [x]  Reviewed discharge medication reconciliation  []  Discharge medication list was updated or corrected    [x]  Added information to AVS   []  Patient education was provided on new medications  []  Patient education was provided on medication changes  []  Reviewed the After Visit Summary (AVS) with patient    Additional Interventions:  []  Inpatient prescriber was contacted and the following pharmacy recommendations        were accepted: **     [] Other interventions: **        Pharmacist: Leanne Scruggs PharmD, [facility]  Date:  11/13/2020 3:27 PM

## 2020-11-13 NOTE — CARE COORDINATION
Care Coordination: Received a call from [address] [place] [address]. Pt is set up for 6 pm . They will take without precert but will need dc summary faxed to them asap.   Nursing is aware and will call for dc instructions      Kole Morales

## 2021-02-02 ENCOUNTER — APPOINTMENT (OUTPATIENT)
Dept: GENERAL RADIOLOGY | Age: 86
DRG: 698 | End: 2021-02-02
Payer: MEDICARE

## 2021-02-02 ENCOUNTER — APPOINTMENT (OUTPATIENT)
Dept: CT IMAGING | Age: 86
DRG: 698 | End: 2021-02-02
Payer: MEDICARE

## 2021-02-02 ENCOUNTER — HOSPITAL ENCOUNTER (INPATIENT)
Age: 86
LOS: 8 days | Discharge: NURSING, REHAB OR LONG-TERM CARE | DRG: 698 | End: 2021-02-10
Attending: EMERGENCY MEDICINE | Admitting: INTERNAL MEDICINE
Payer: MEDICARE

## 2021-02-02 DIAGNOSIS — E87.0 HYPERNATREMIA: ICD-10-CM

## 2021-02-02 DIAGNOSIS — A41.9 SEPTICEMIA (HCC): Primary | ICD-10-CM

## 2021-02-02 DIAGNOSIS — D73.5 SPLENIC INFARCT: ICD-10-CM

## 2021-02-02 DIAGNOSIS — J18.9 PNEUMONIA DUE TO ORGANISM: ICD-10-CM

## 2021-02-02 DIAGNOSIS — E86.0 DEHYDRATION: ICD-10-CM

## 2021-02-02 DIAGNOSIS — K57.32 DIVERTICULITIS OF COLON: ICD-10-CM

## 2021-02-02 PROBLEM — R65.20 SEVERE SEPSIS (HCC): Status: ACTIVE | Noted: 2021-02-02

## 2021-02-02 LAB
ADENOVIRUS BY PCR: NOT DETECTED
ALBUMIN SERPL-MCNC: 2.2 G/DL (ref 3.5–5.2)
ALP BLD-CCNC: 92 U/L (ref 35–104)
ALT SERPL-CCNC: 10 U/L (ref 0–32)
AMMONIA: 38 UMOL/L (ref 11–51)
ANION GAP SERPL CALCULATED.3IONS-SCNC: 12 MMOL/L (ref 7–16)
ANION GAP SERPL CALCULATED.3IONS-SCNC: 9 MMOL/L (ref 7–16)
ANISOCYTOSIS: ABNORMAL
AST SERPL-CCNC: 17 U/L (ref 0–31)
B.E.: -1.2 MMOL/L (ref -3–3)
BACTERIA: ABNORMAL /HPF
BASOPHILS ABSOLUTE: 0 E9/L (ref 0–0.2)
BASOPHILS RELATIVE PERCENT: 0.5 % (ref 0–2)
BILIRUB SERPL-MCNC: 0.3 MG/DL (ref 0–1.2)
BILIRUBIN URINE: NEGATIVE
BLOOD, URINE: ABNORMAL
BORDETELLA PARAPERTUSSIS BY PCR: NOT DETECTED
BORDETELLA PERTUSSIS BY PCR: NOT DETECTED
BUN BLDV-MCNC: 32 MG/DL (ref 8–23)
BUN BLDV-MCNC: 36 MG/DL (ref 8–23)
BURR CELLS: ABNORMAL
CALCIUM SERPL-MCNC: 8.6 MG/DL (ref 8.6–10.2)
CALCIUM SERPL-MCNC: 8.6 MG/DL (ref 8.6–10.2)
CHLAMYDOPHILIA PNEUMONIAE BY PCR: NOT DETECTED
CHLORIDE BLD-SCNC: 121 MMOL/L (ref 98–107)
CHLORIDE BLD-SCNC: 121 MMOL/L (ref 98–107)
CHP ED QC CHECK: NORMAL
CLARITY: ABNORMAL
CO2: 19 MMOL/L (ref 22–29)
CO2: 24 MMOL/L (ref 22–29)
COHB: 0.3 % (ref 0–1.5)
COLOR: ABNORMAL
CORONAVIRUS 229E BY PCR: NOT DETECTED
CORONAVIRUS HKU1 BY PCR: NOT DETECTED
CORONAVIRUS NL63 BY PCR: NOT DETECTED
CORONAVIRUS OC43 BY PCR: NOT DETECTED
CREAT SERPL-MCNC: 0.4 MG/DL (ref 0.5–1)
CREAT SERPL-MCNC: 0.5 MG/DL (ref 0.5–1)
CRITICAL: ABNORMAL
DATE ANALYZED: ABNORMAL
DATE OF COLLECTION: ABNORMAL
EOSINOPHILS ABSOLUTE: 0 E9/L (ref 0.05–0.5)
EOSINOPHILS RELATIVE PERCENT: 0.3 % (ref 0–6)
EPITHELIAL CELLS, UA: ABNORMAL /HPF
GFR AFRICAN AMERICAN: >60
GFR AFRICAN AMERICAN: >60
GFR NON-AFRICAN AMERICAN: >60 ML/MIN/1.73
GFR NON-AFRICAN AMERICAN: >60 ML/MIN/1.73
GLUCOSE BLD-MCNC: 103 MG/DL (ref 74–99)
GLUCOSE BLD-MCNC: 109 MG/DL (ref 74–99)
GLUCOSE BLD-MCNC: 99 MG/DL
GLUCOSE URINE: NEGATIVE MG/DL
HCO3: 22.2 MMOL/L (ref 22–26)
HCT VFR BLD CALC: 35.3 % (ref 34–48)
HEMOGLOBIN: 11 G/DL (ref 11.5–15.5)
HHB: 1.3 % (ref 0–5)
HUMAN METAPNEUMOVIRUS BY PCR: NOT DETECTED
HUMAN RHINOVIRUS/ENTEROVIRUS BY PCR: NOT DETECTED
HYPOCHROMIA: ABNORMAL
INFLUENZA A BY PCR: NOT DETECTED
INFLUENZA B BY PCR: NOT DETECTED
KETONES, URINE: ABNORMAL MG/DL
LAB: ABNORMAL
LACTIC ACID, SEPSIS: 1.2 MMOL/L (ref 0.5–1.9)
LACTIC ACID, SEPSIS: 1.8 MMOL/L (ref 0.5–1.9)
LACTIC ACID, SEPSIS: 3.8 MMOL/L (ref 0.5–1.9)
LEUKOCYTE ESTERASE, URINE: ABNORMAL
LYMPHOCYTES ABSOLUTE: 3.22 E9/L (ref 1.5–4)
LYMPHOCYTES RELATIVE PERCENT: 15.7 % (ref 20–42)
Lab: ABNORMAL
MCH RBC QN AUTO: 28 PG (ref 26–35)
MCHC RBC AUTO-ENTMCNC: 31.2 % (ref 32–34.5)
MCV RBC AUTO: 89.8 FL (ref 80–99.9)
METER GLUCOSE: 99 MG/DL (ref 74–99)
METHB: 0.4 % (ref 0–1.5)
MODE: ABNORMAL
MONOCYTES ABSOLUTE: 1.41 E9/L (ref 0.1–0.95)
MONOCYTES RELATIVE PERCENT: 7 % (ref 2–12)
MYCOPLASMA PNEUMONIAE BY PCR: NOT DETECTED
MYELOCYTE PERCENT: 1.7 % (ref 0–0)
NEUTROPHILS ABSOLUTE: 15.48 E9/L (ref 1.8–7.3)
NEUTROPHILS RELATIVE PERCENT: 75.7 % (ref 43–80)
NITRITE, URINE: NEGATIVE
O2 CONTENT: 15.7 ML/DL
O2 SATURATION: 98.7 % (ref 92–98.5)
O2HB: 98 % (ref 94–97)
OPERATOR ID: ABNORMAL
OSMOLALITY: 327 MOSM/KG (ref 285–310)
OVALOCYTES: ABNORMAL
PARAINFLUENZA VIRUS 1 BY PCR: NOT DETECTED
PARAINFLUENZA VIRUS 2 BY PCR: NOT DETECTED
PARAINFLUENZA VIRUS 3 BY PCR: NOT DETECTED
PARAINFLUENZA VIRUS 4 BY PCR: NOT DETECTED
PATIENT TEMP: 37 C
PCO2: 32.7 MMHG (ref 35–45)
PDW BLD-RTO: 16.6 FL (ref 11.5–15)
PH BLOOD GAS: 7.45 (ref 7.35–7.45)
PH UA: 5.5 (ref 5–9)
PLATELET # BLD: 310 E9/L (ref 130–450)
PMV BLD AUTO: 11.1 FL (ref 7–12)
PO2: 173.5 MMHG (ref 75–100)
POIKILOCYTES: ABNORMAL
POTASSIUM REFLEX MAGNESIUM: 3.7 MMOL/L (ref 3.5–5)
POTASSIUM SERPL-SCNC: 3.7 MMOL/L (ref 3.5–5)
PRO-BNP: 2928 PG/ML (ref 0–450)
PROCALCITONIN: 0.15 NG/ML (ref 0–0.08)
PROCALCITONIN: 0.17 NG/ML (ref 0–0.08)
PROTEIN UA: 30 MG/DL
RBC # BLD: 3.93 E12/L (ref 3.5–5.5)
RBC UA: ABNORMAL /HPF (ref 0–2)
RESPIRATORY SYNCYTIAL VIRUS BY PCR: NOT DETECTED
SARS-COV-2 ANTIBODY, TOTAL: REACTIVE
SARS-COV-2, NAAT: NOT DETECTED
SARS-COV-2, PCR: NOT DETECTED
SODIUM BLD-SCNC: 152 MMOL/L (ref 132–146)
SODIUM BLD-SCNC: 154 MMOL/L (ref 132–146)
SOURCE, BLOOD GAS: ABNORMAL
SPECIFIC GRAVITY UA: >=1.03 (ref 1–1.03)
THB: 11.1 G/DL (ref 11.5–16.5)
TIME ANALYZED: 1057
TOTAL PROTEIN: 6.4 G/DL (ref 6.4–8.3)
TROPONIN: 0.07 NG/ML (ref 0–0.03)
UROBILINOGEN, URINE: 0.2 E.U./DL
VALPROIC ACID LEVEL: 20 MCG/ML (ref 50–100)
WBC # BLD: 20.1 E9/L (ref 4.5–11.5)
WBC UA: >20 /HPF (ref 0–5)

## 2021-02-02 PROCEDURE — 80164 ASSAY DIPROPYLACETIC ACD TOT: CPT

## 2021-02-02 PROCEDURE — 2580000003 HC RX 258: Performed by: NURSE PRACTITIONER

## 2021-02-02 PROCEDURE — 83930 ASSAY OF BLOOD OSMOLALITY: CPT

## 2021-02-02 PROCEDURE — 82140 ASSAY OF AMMONIA: CPT

## 2021-02-02 PROCEDURE — 93005 ELECTROCARDIOGRAM TRACING: CPT | Performed by: GENERAL PRACTICE

## 2021-02-02 PROCEDURE — 85025 COMPLETE CBC W/AUTO DIFF WBC: CPT

## 2021-02-02 PROCEDURE — 99222 1ST HOSP IP/OBS MODERATE 55: CPT | Performed by: SURGERY

## 2021-02-02 PROCEDURE — 83605 ASSAY OF LACTIC ACID: CPT

## 2021-02-02 PROCEDURE — 87088 URINE BACTERIA CULTURE: CPT

## 2021-02-02 PROCEDURE — 94640 AIRWAY INHALATION TREATMENT: CPT

## 2021-02-02 PROCEDURE — 82962 GLUCOSE BLOOD TEST: CPT

## 2021-02-02 PROCEDURE — 83880 ASSAY OF NATRIURETIC PEPTIDE: CPT

## 2021-02-02 PROCEDURE — 87040 BLOOD CULTURE FOR BACTERIA: CPT

## 2021-02-02 PROCEDURE — 2580000003 HC RX 258: Performed by: EMERGENCY MEDICINE

## 2021-02-02 PROCEDURE — 84484 ASSAY OF TROPONIN QUANT: CPT

## 2021-02-02 PROCEDURE — 74177 CT ABD & PELVIS W/CONTRAST: CPT

## 2021-02-02 PROCEDURE — 36415 COLL VENOUS BLD VENIPUNCTURE: CPT

## 2021-02-02 PROCEDURE — 0202U NFCT DS 22 TRGT SARS-COV-2: CPT

## 2021-02-02 PROCEDURE — 71045 X-RAY EXAM CHEST 1 VIEW: CPT

## 2021-02-02 PROCEDURE — U0002 COVID-19 LAB TEST NON-CDC: HCPCS

## 2021-02-02 PROCEDURE — 2500000003 HC RX 250 WO HCPCS: Performed by: GENERAL PRACTICE

## 2021-02-02 PROCEDURE — 2060000000 HC ICU INTERMEDIATE R&B

## 2021-02-02 PROCEDURE — 6360000002 HC RX W HCPCS: Performed by: NURSE PRACTITIONER

## 2021-02-02 PROCEDURE — 80053 COMPREHEN METABOLIC PANEL: CPT

## 2021-02-02 PROCEDURE — 87186 SC STD MICRODIL/AGAR DIL: CPT

## 2021-02-02 PROCEDURE — 96360 HYDRATION IV INFUSION INIT: CPT

## 2021-02-02 PROCEDURE — 99223 1ST HOSP IP/OBS HIGH 75: CPT | Performed by: NURSE PRACTITIONER

## 2021-02-02 PROCEDURE — 99283 EMERGENCY DEPT VISIT LOW MDM: CPT

## 2021-02-02 PROCEDURE — 96361 HYDRATE IV INFUSION ADD-ON: CPT

## 2021-02-02 PROCEDURE — 2580000003 HC RX 258: Performed by: GENERAL PRACTICE

## 2021-02-02 PROCEDURE — 81001 URINALYSIS AUTO W/SCOPE: CPT

## 2021-02-02 PROCEDURE — 82805 BLOOD GASES W/O2 SATURATION: CPT

## 2021-02-02 PROCEDURE — 6360000004 HC RX CONTRAST MEDICATION: Performed by: RADIOLOGY

## 2021-02-02 PROCEDURE — 87077 CULTURE AEROBIC IDENTIFY: CPT

## 2021-02-02 PROCEDURE — 6370000000 HC RX 637 (ALT 250 FOR IP): Performed by: STUDENT IN AN ORGANIZED HEALTH CARE EDUCATION/TRAINING PROGRAM

## 2021-02-02 PROCEDURE — 84145 PROCALCITONIN (PCT): CPT

## 2021-02-02 PROCEDURE — 86769 SARS-COV-2 COVID-19 ANTIBODY: CPT

## 2021-02-02 PROCEDURE — 6360000002 HC RX W HCPCS: Performed by: EMERGENCY MEDICINE

## 2021-02-02 PROCEDURE — 71275 CT ANGIOGRAPHY CHEST: CPT

## 2021-02-02 PROCEDURE — 6360000002 HC RX W HCPCS: Performed by: GENERAL PRACTICE

## 2021-02-02 PROCEDURE — 80048 BASIC METABOLIC PNL TOTAL CA: CPT

## 2021-02-02 PROCEDURE — 74018 RADEX ABDOMEN 1 VIEW: CPT

## 2021-02-02 PROCEDURE — C9113 INJ PANTOPRAZOLE SODIUM, VIA: HCPCS | Performed by: NURSE PRACTITIONER

## 2021-02-02 PROCEDURE — 99222 1ST HOSP IP/OBS MODERATE 55: CPT | Performed by: INTERNAL MEDICINE

## 2021-02-02 RX ORDER — OMEPRAZOLE 40 MG/1
40 CAPSULE, DELAYED RELEASE ORAL
Status: ON HOLD | COMMUNITY
End: 2021-02-10 | Stop reason: HOSPADM

## 2021-02-02 RX ORDER — DONEPEZIL HYDROCHLORIDE 10 MG/1
10 TABLET, FILM COATED ORAL NIGHTLY
COMMUNITY

## 2021-02-02 RX ORDER — SERTRALINE HYDROCHLORIDE 25 MG/1
25 TABLET, FILM COATED ORAL DAILY
COMMUNITY

## 2021-02-02 RX ORDER — MAGNESIUM SULFATE 1 G/100ML
1000 INJECTION INTRAVENOUS PRN
Status: DISCONTINUED | OUTPATIENT
Start: 2021-02-02 | End: 2021-02-10 | Stop reason: HOSPADM

## 2021-02-02 RX ORDER — AMANTADINE HYDROCHLORIDE 100 MG/1
100 TABLET ORAL 2 TIMES DAILY
Status: ON HOLD | COMMUNITY
End: 2021-02-10 | Stop reason: HOSPADM

## 2021-02-02 RX ORDER — 0.9 % SODIUM CHLORIDE 0.9 %
1000 INTRAVENOUS SOLUTION INTRAVENOUS ONCE
Status: COMPLETED | OUTPATIENT
Start: 2021-02-02 | End: 2021-02-02

## 2021-02-02 RX ORDER — SODIUM CHLORIDE AND POTASSIUM CHLORIDE .9; .15 G/100ML; G/100ML
SOLUTION INTRAVENOUS CONTINUOUS
Status: DISCONTINUED | OUTPATIENT
Start: 2021-02-02 | End: 2021-02-04

## 2021-02-02 RX ORDER — ACETAMINOPHEN 325 MG/1
650 TABLET ORAL EVERY 6 HOURS PRN
Status: DISCONTINUED | OUTPATIENT
Start: 2021-02-02 | End: 2021-02-10 | Stop reason: HOSPADM

## 2021-02-02 RX ORDER — BUDESONIDE 0.5 MG/2ML
0.5 INHALANT ORAL 2 TIMES DAILY
Status: DISCONTINUED | OUTPATIENT
Start: 2021-02-02 | End: 2021-02-10 | Stop reason: HOSPADM

## 2021-02-02 RX ORDER — LANOLIN ALCOHOL/MO/W.PET/CERES
1 CREAM (GRAM) TOPICAL DAILY
Status: ON HOLD | COMMUNITY
End: 2021-02-10 | Stop reason: HOSPADM

## 2021-02-02 RX ORDER — PANTOPRAZOLE SODIUM 40 MG/10ML
40 INJECTION, POWDER, LYOPHILIZED, FOR SOLUTION INTRAVENOUS DAILY
Status: DISCONTINUED | OUTPATIENT
Start: 2021-02-02 | End: 2021-02-09

## 2021-02-02 RX ORDER — POLYETHYLENE GLYCOL 3350 17 G/17G
17 POWDER, FOR SOLUTION ORAL 2 TIMES DAILY
Status: DISCONTINUED | OUTPATIENT
Start: 2021-02-02 | End: 2021-02-03

## 2021-02-02 RX ORDER — POLYETHYLENE GLYCOL 3350 17 G/17G
17 POWDER, FOR SOLUTION ORAL DAILY
Status: ON HOLD | COMMUNITY
End: 2021-02-10 | Stop reason: HOSPADM

## 2021-02-02 RX ORDER — SODIUM CHLORIDE 0.9 % (FLUSH) 0.9 %
10 SYRINGE (ML) INJECTION PRN
Status: DISCONTINUED | OUTPATIENT
Start: 2021-02-02 | End: 2021-02-10 | Stop reason: HOSPADM

## 2021-02-02 RX ORDER — ARFORMOTEROL TARTRATE 15 UG/2ML
15 SOLUTION RESPIRATORY (INHALATION) 2 TIMES DAILY
Status: DISCONTINUED | OUTPATIENT
Start: 2021-02-02 | End: 2021-02-10 | Stop reason: HOSPADM

## 2021-02-02 RX ORDER — ACETAMINOPHEN 650 MG/1
650 SUPPOSITORY RECTAL EVERY 6 HOURS PRN
Status: DISCONTINUED | OUTPATIENT
Start: 2021-02-02 | End: 2021-02-10 | Stop reason: HOSPADM

## 2021-02-02 RX ORDER — ACETAMINOPHEN 325 MG/1
650 TABLET ORAL EVERY 6 HOURS PRN
Status: ON HOLD | COMMUNITY
End: 2021-02-10 | Stop reason: HOSPADM

## 2021-02-02 RX ORDER — GUAIFENESIN 600 MG/1
600 TABLET, EXTENDED RELEASE ORAL EVERY 12 HOURS PRN
Status: ON HOLD | COMMUNITY
End: 2021-02-10 | Stop reason: HOSPADM

## 2021-02-02 RX ORDER — POTASSIUM CHLORIDE 7.45 MG/ML
10 INJECTION INTRAVENOUS PRN
Status: DISCONTINUED | OUTPATIENT
Start: 2021-02-02 | End: 2021-02-10 | Stop reason: HOSPADM

## 2021-02-02 RX ORDER — SODIUM CHLORIDE 0.9 % (FLUSH) 0.9 %
10 SYRINGE (ML) INJECTION EVERY 12 HOURS SCHEDULED
Status: DISCONTINUED | OUTPATIENT
Start: 2021-02-02 | End: 2021-02-10 | Stop reason: HOSPADM

## 2021-02-02 RX ORDER — M-VIT,TX,IRON,MINS/CALC/FOLIC 27MG-0.4MG
1 TABLET ORAL DAILY
Status: ON HOLD | COMMUNITY
End: 2021-02-10 | Stop reason: HOSPADM

## 2021-02-02 RX ORDER — MULTIVITAMIN WITH IRON
250 TABLET ORAL DAILY
Status: ON HOLD | COMMUNITY
End: 2021-02-10 | Stop reason: HOSPADM

## 2021-02-02 RX ORDER — ALBUTEROL SULFATE 2.5 MG/3ML
2.5 SOLUTION RESPIRATORY (INHALATION) EVERY 4 HOURS PRN
Status: DISCONTINUED | OUTPATIENT
Start: 2021-02-02 | End: 2021-02-10 | Stop reason: HOSPADM

## 2021-02-02 RX ORDER — VITAMIN E 268 MG
400 CAPSULE ORAL 2 TIMES DAILY
Status: ON HOLD | COMMUNITY
End: 2021-02-10 | Stop reason: HOSPADM

## 2021-02-02 RX ORDER — SENNA AND DOCUSATE SODIUM 50; 8.6 MG/1; MG/1
2 TABLET, FILM COATED ORAL DAILY
Status: DISCONTINUED | OUTPATIENT
Start: 2021-02-02 | End: 2021-02-05

## 2021-02-02 RX ORDER — SODIUM CHLORIDE 9 MG/ML
INJECTION, SOLUTION INTRAVENOUS EVERY 8 HOURS
Status: DISCONTINUED | OUTPATIENT
Start: 2021-02-03 | End: 2021-02-08

## 2021-02-02 RX ADMIN — PIPERACILLIN AND TAZOBACTAM 3375 MG: 3; .375 INJECTION, POWDER, FOR SOLUTION INTRAVENOUS at 14:17

## 2021-02-02 RX ADMIN — METRONIDAZOLE 500 MG: 500 INJECTION, SOLUTION INTRAVENOUS at 13:21

## 2021-02-02 RX ADMIN — ALBUTEROL SULFATE 2.5 MG: 2.5 SOLUTION RESPIRATORY (INHALATION) at 16:34

## 2021-02-02 RX ADMIN — Medication: at 18:05

## 2021-02-02 RX ADMIN — PANTOPRAZOLE SODIUM 40 MG: 40 INJECTION, POWDER, FOR SOLUTION INTRAVENOUS at 18:05

## 2021-02-02 RX ADMIN — DEXTROSE MONOHYDRATE 750 MG: 50 INJECTION, SOLUTION INTRAVENOUS at 16:33

## 2021-02-02 RX ADMIN — ARFORMOTEROL TARTRATE 15 MCG: 15 SOLUTION RESPIRATORY (INHALATION) at 16:34

## 2021-02-02 RX ADMIN — IOPAMIDOL 75 ML: 755 INJECTION, SOLUTION INTRAVENOUS at 12:26

## 2021-02-02 RX ADMIN — BUDESONIDE 500 MCG: 0.5 INHALANT RESPIRATORY (INHALATION) at 16:34

## 2021-02-02 RX ADMIN — WATER 1000 MG: 1 INJECTION INTRAMUSCULAR; INTRAVENOUS; SUBCUTANEOUS at 13:17

## 2021-02-02 RX ADMIN — POTASSIUM CHLORIDE AND SODIUM CHLORIDE: 900; 150 INJECTION, SOLUTION INTRAVENOUS at 16:21

## 2021-02-02 RX ADMIN — POTASSIUM CHLORIDE AND SODIUM CHLORIDE: 900; 150 INJECTION, SOLUTION INTRAVENOUS at 13:24

## 2021-02-02 RX ADMIN — SODIUM CHLORIDE 1000 ML: 9 INJECTION, SOLUTION INTRAVENOUS at 11:00

## 2021-02-02 RX ADMIN — ENOXAPARIN SODIUM 50 MG: 60 INJECTION SUBCUTANEOUS at 14:22

## 2021-02-02 RX ADMIN — PIPERACILLIN AND TAZOBACTAM 3375 MG: 3; .375 INJECTION, POWDER, FOR SOLUTION INTRAVENOUS at 23:22

## 2021-02-02 NOTE — ED NOTES
Bed: 04  Expected date:   Expected time:   Means of arrival:   Comments:  Lissette Gregory RN  02/02/21 6670

## 2021-02-02 NOTE — CONSULTS
[id number] [facility] Cardiology consult  Dr. Alejandro Hicks      Reason for Consult: Elevated troponin  Requesting Physician: Dr. Chyna Brown: Altered mental status  History Obtained From: patient, electronic medical record  HISTORY OF PRESENT ILLNESS:       Patient is 80years old female with a history of hypertension, rheumatoid arthritis, was admitted to the hospital with altered mental status, patient was found to have a have elevated troponin, cardiology was consulted.   No further history could be obtained since patient is nonverbal      Past Medical History:   Diagnosis Date    Anemia     Cellulitis     GERD (gastroesophageal reflux disease)     Glaucoma     Hypertension     Hypokalemia     Insomnia     Osteoarthritis     Prediabetes 11/4/2020    Rheumatoid arthritis(714.0)        Past Surgical History:   Procedure Laterality Date    EYE SURGERY      cataracts    JOINT REPLACEMENT Left     OTHER SURGICAL HISTORY Right 8/7/2015    IM nailing right femor         Current Facility-Administered Medications:     0.9% NaCl with KCl 20 mEq infusion, , Intravenous, Continuous, Viveca Pierre, APRN - CNP, Last Rate: 75 mL/hr at 02/02/21 1324, New Bag at 02/02/21 1324    sodium chloride flush 0.9 % injection 10 mL, 10 mL, Intravenous, 2 times per day, Viveca Pierre, APRN - CNP    sodium chloride flush 0.9 % injection 10 mL, 10 mL, Intravenous, PRN, Viveca Pierre, APRN - CNP    acetaminophen (TYLENOL) tablet 650 mg, 650 mg, Oral, Q6H PRN **OR** acetaminophen (TYLENOL) suppository 650 mg, 650 mg, Rectal, Q6H PRN, Viveca Pierre, APRN - CNP    vancomycin (VANCOCIN) 750 mg in dextrose 5 % 250 mL IVPB, 15 mg/kg, Intravenous, Q18H, Viveca Pierre, APRN - CNP    albuterol (PROVENTIL) nebulizer solution 2.5 mg, 2.5 mg, Nebulization, Q4H PRN, Viveca Pierre, APRN - CNP    budesonide (PULMICORT) nebulizer suspension 500 mcg, 0.5 mg, Nebulization, BID, Viveca Pierre, APRN - CNP   magnesium sulfate 1000 mg in dextrose 5% 100 mL IVPB, 1,000 mg, Intravenous, PRN, PAUL Camargo CNP    sodium phosphate 8.7 mmol in dextrose 5 % 250 mL IVPB, 0.16 mmol/kg, Intravenous, PRN **OR** sodium phosphate 17.4 mmol in dextrose 5 % 250 mL IVPB, 0.32 mmol/kg, Intravenous, PRN, PAUL Camargo CNP    potassium chloride 10 mEq/100 mL IVPB (Peripheral Line), 10 mEq, Intravenous, PRN, PAUL Camargo CNP    pantoprazole (PROTONIX) injection 40 mg, 40 mg, Intravenous, Daily, PAUL Camargo CNP    Arformoterol Tartrate (BROVANA) nebulizer solution 15 mcg, 15 mcg, Nebulization, BID, PAUL Camargo CNP    enoxaparin (LOVENOX) injection 50 mg, 1 mg/kg, Subcutaneous, BID, PAUL Camargo CNP    vancomycin (VANCOCIN) 750 mg in dextrose 5 % 250 mL IVPB, 15 mg/kg, Intravenous, Once, Mary Mcelroy DO    piperacillin-tazobactam (ZOSYN) 3,375 mg in dextrose 5 % 50 mL IVPB extended infusion (mini-bag), 3,375 mg, Intravenous, Q8H **AND** [START ON 2/3/2021] 0.9 % sodium chloride infusion, , Intravenous, Q8H, PAUL Camargo CNP    Allergies as of 02/02/2021 - Review Complete 02/02/2021   Allergen Reaction Noted    Food Diarrhea and Rash 04/18/2013       Social History     Socioeconomic History    Marital status:       Spouse name: Not on file    Number of children: Not on file    Years of education: Not on file    Highest education level: Not on file   Occupational History    Not on file   Social Needs    Financial resource strain: Not on file    Food insecurity     Worry: Not on file     Inability: Not on file    Transportation needs     Medical: Not on file     Non-medical: Not on file   Tobacco Use    Smoking status: Former Smoker    Smokeless tobacco: Never Used   Substance and Sexual Activity    Alcohol use: No    Drug use: No    Sexual activity: Not on file   Lifestyle    Physical activity     Days per week: Not on file Minutes per session: Not on file    Stress: Not on file   Relationships    Social connections     Talks on phone: Not on file     Gets together: Not on file     Attends Oriental orthodox service: Not on file     Active member of club or organization: Not on file     Attends meetings of clubs or organizations: Not on file     Relationship status: Not on file    Intimate partner violence     Fear of current or ex partner: Not on file     Emotionally abused: Not on file     Physically abused: Not on file     Forced sexual activity: Not on file   Other Topics Concern    Not on file   Social History Narrative    Not on file       Family History   Problem Relation Age of Onset    Heart Failure Mother        REVIEW OF SYSTEMS:     Unable to obtain since patient is nonverbal    PHYSICAL EXAM:   CONSTITUTIONAL:no apparent distress, and appears stated age  HEAD:  normocepalic, without obvious abnormality, atraumatic, pink, moist mucous membranes. NECK:  Supple, symmetrical, trachea midline, no adenopathy, thyroid symmetric, not enlarged and no tenderness, skin normal  HEMATOLOGIC/LYMPHATICS:  no cervical lymphadenopathy and no supraclavicular lymphadenopathy  LUNGS:  No increased work of breathing,  No accessory muscle use or intercostal retractions, decreased air exchange due to poor inspiratory effort, bilateral rhonchi  CARDIOVASCULAR:  Normal apical impulse, regular rate and rhythm, normal S1 and S2, no S3 or S4, 3/6 systolic murmur at the apex, 3/6 systolic murmur at the left lower sternal border, 3/6 systolic ejection murmur at the right upper sternal border, no JVD, no carotid bruit, no pedal edema, good carotid upstroke bilaterally. ABDOMEN:  Soft, nontender, no masses, no hepatomegaly or splenomegaly, BS+  CHEST: nontender to palpation, expands symmetrically  MUSCULOSKELETAL:  No clubbing no cyanosis. there is no redness, warmth, or swelling of the joints  NEUROLOGIC: Nonverbal BP (!) 144/62   Pulse 108   Temp 99 °F (37.2 °C) (Axillary)   Resp 20   Ht 5' 4\" (1.626 m)   Wt 120 lb (54.4 kg)   SpO2 100%   BMI 20.60 kg/m²     DATA:   I personally reviewed the admission EKG with the following interpretation: Sinus rhythm poor R wave progression    ECHO: 5/2/2013,  Summary    Mild mitral regurgitation is present. No mitral valve stenosis present. 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 The aortic valve area is 1.2 cm2 with a maximum gradient of 22.60 mmHg and    a mean gradient of 13.70 mmHg. Mild aortic stenosis is present. The aortic valve is trileaflet. The aortic valve appears moderately sclerotic. No evidence of aortic valve regurgitation. Regular rhythm.     Stress Test: Not performed to date  Angiography: Not performed to date  Cardiology Labs:   BMP:    Lab Results   Component Value Date     02/02/2021    K 3.7 02/02/2021     02/02/2021    CO2 24 02/02/2021    BUN 36 02/02/2021     CMP:    Lab Results   Component Value Date     02/02/2021    K 3.7 02/02/2021     02/02/2021    CO2 24 02/02/2021    BUN 36 02/02/2021    PROT 6.4 02/02/2021     CBC:    Lab Results   Component Value Date    WBC 20.1 02/02/2021    RBC 3.93 02/02/2021    HGB 11.0 02/02/2021    HCT 35.3 02/02/2021    MCV 89.8 02/02/2021    RDW 16.6 02/02/2021     02/02/2021     PT/INR:  No results found for: PTINR  PT/INR Warfarin:  No components found for: PTPATWAR, PTINRWAR  PTT:    Lab Results   Component Value Date    APTT 28.9 11/13/2020     PTT Heparin:  No components found for: APTTHEP  Magnesium:    Lab Results   Component Value Date    MG 2.6 11/03/2020     TSH:    Lab Results   Component Value Date    TSH 1.230 08/08/2015     TROPONIN:  No components found for: TROP  BNP:    Lab Results   Component Value Date    BNP 79 04/18/2013     FASTING LIPID PANEL:    Lab Results   Component Value Date    CHOL 149 05/03/2013    HDL 56.0 05/03/2013 TRIG 69 05/03/2013     CT ABDOMEN PELVIS W IV CONTRAST Additional Contrast? None   Final Result   Diverticulosis of the colon with significantly distended and thickened   rectosigmoid colon concerning for proctitis/diverticulitis. Contracted urinary bladder which is displaced anteriorly with wall thickening   concerning for cystitis. Hypodensity in the spleen concerning for splenic infarct. Multifocal infiltrates in the lung bases concerning for pneumonia. CTA PULMONARY W CONTRAST   Final Result   1. There is no evidence of a pulmonary embolus. 2. Multifocal bilateral hazy pulmonary infiltrates. These findings could   represent pneumonia or possibly pneumonia superimposed on pleuroparenchymal   scarring. 3. There is no lung consolidation and there is no pleural effusion or pleural   thickening. XR CHEST PORTABLE   Final Result   Hazy airspace disease, left greater than right. Suspect pneumonia. Atypical   pulmonary edema could give a similar appearance. I have personally reviewed the laboratory, cardiac diagnostic and radiographic testing as outlined above:      IMPRESSION:    1. Elevated troponin: Secondary to sepsis and acute hypoxic respiratory failure, will continue current treatment  2. Altered mental status  3. Anemia  4. Hypernatremia    RECOMMENDATIONS:   1. Continue current treatment  2. No further cardiac work-up is planned at this point of time given her advanced age and overall poor condition    I have reviewed my findings and recommendations with patient    Thank you for the consult  Electronically signed by Wendy Zaman MD on 2/2/2021 at 3:29 PM  NOTE: This report was transcribed using voice recognition software.  Every effort was made to ensure accuracy; however, inadvertent computerized transcription errors may be present

## 2021-02-02 NOTE — ED PROVIDER NOTES
Chief complaint: Fever altered mental status    ED  Provider Note  Admit Date/RoomTime: 2/2/2021  9:53 AM  ED Room: 04/04     HPI:   Rickey Rios is a 80 y.o. female presenting to the ED for fever, beginning several hours ago. History comes primarily from EMS. Past medical history includes dementia, rheumatoid arthritis, prior Covid infection. . The complaint has been constant, moderate in severity, improved by nothing and worsened by nothing. Associated symptoms include chills, respiratory distress. Teresa is a  [facility] patient with severe dementia who was noted by the nursing home staff to be febrile appearing with tachypnea today at her facility. Her dementia was baseline, however she appeared to be uncomfortable and was diaphoretic. For this reason she was transported to [facility] for an emergency department for further evaluation and treatment. On arrival, she was assessed with history, physical exam, imaging studies, laboratory studies, EKG, vital signs. Patient's vital signs were notable for a mild hypotension of 110/50, a febrile state of 99.8, and satting 100% on nasal cannula. The history is limited secondary to the patient's altered mental status    Review of Systems   Unable to perform ROS: Dementia        Physical Exam  Vitals signs and nursing note reviewed. Constitutional:       Appearance: She is well-developed. She is ill-appearing. Comments: Diaphoretic   HENT:      Head: Normocephalic and atraumatic. Right Ear: External ear normal.      Left Ear: External ear normal.      Nose: Nose normal.      Mouth/Throat:      Mouth: Mucous membranes are dry. Eyes:      Pupils: Pupils are equal, round, and reactive to light. Neck:      Musculoskeletal: Normal range of motion and neck supple. Cardiovascular:      Rate and Rhythm: Normal rate and regular rhythm. Pulses: Normal pulses.    Pulmonary: Effort: Pulmonary effort is normal. No respiratory distress. Breath sounds: Normal breath sounds. No wheezing or rales. Abdominal:      General: Bowel sounds are normal.      Palpations: Abdomen is soft. There is mass (Firm, palpable masses in bilateral lower quadrants). Tenderness: There is no abdominal tenderness. There is no guarding or rebound. Musculoskeletal:      Right lower leg: No edema. Left lower leg: No edema. Comments: Black eschar noted on right lateral heel   Skin:     General: Skin is warm and dry. Capillary Refill: Capillary refill takes less than 2 seconds. Comments: Black, approximately 3 cm circumferential eschar appreciated on the right lateral heel. Wound does not open   Neurological:      Comments: Patient is midline, arousable to verbal and tactile stimuli, patient is a poor participant in the neurologic examination   Psychiatric:      Comments: Go to be evaluated secondary to the altered mental status          Procedures     MDM  Number of Diagnoses or Management Options  Diagnosis management comments: Emergency department evaluation was notable for an ill-appearing patient with a leukocytosis of 20 in the setting of evident diverticulitis on CT scan. Patient was started on fluids as well as antibiotics in the emergency department, and will be admitted for further close monitoring and treatment in the inpatient setting. This information was relayed to the patient who understood this plan of care and was amenable to the plan.   Patient was discussed with the admitting service (Dr. Lovetta Gaucher) who concurred with the decision for admission, and have agreed to admit the patient to med/surg          --------------------------------------------- PAST HISTORY --------------------------------------------- Parainfluenza Virus 3 by PCR Not Detected Not Detected    Parainfluenza Virus 4 by PCR Not Detected Not Detected    Respiratory Syncytial Virus by PCR Not Detected Not Detected   CBC Auto Differential   Result Value Ref Range    WBC 20.1 (H) 4.5 - 11.5 E9/L    RBC 3.93 3.50 - 5.50 E12/L    Hemoglobin 11.0 (L) 11.5 - 15.5 g/dL    Hematocrit 35.3 34.0 - 48.0 %    MCV 89.8 80.0 - 99.9 fL    MCH 28.0 26.0 - 35.0 pg    MCHC 31.2 (L) 32.0 - 34.5 %    RDW 16.6 (H) 11.5 - 15.0 fL    Platelets 064 594 - 777 E9/L    MPV 11.1 7.0 - 12.0 fL    Neutrophils % 75.7 43.0 - 80.0 %    Lymphocytes % 15.7 (L) 20.0 - 42.0 %    Monocytes % 7.0 2.0 - 12.0 %    Eosinophils % 0.3 0.0 - 6.0 %    Basophils % 0.5 0.0 - 2.0 %    Neutrophils Absolute 15.48 (H) 1.80 - 7.30 E9/L    Lymphocytes Absolute 3.22 1.50 - 4.00 E9/L    Monocytes Absolute 1.41 (H) 0.10 - 0.95 E9/L    Eosinophils Absolute 0.00 (L) 0.05 - 0.50 E9/L    Basophils Absolute 0.00 0.00 - 0.20 E9/L    Myelocyte Percent 1.7 0 - 0 %    Anisocytosis 1+     Hypochromia 1+     Poikilocytes 2+     Vernon Cells 1+     Ovalocytes 1+    Comprehensive Metabolic Panel w/ Reflex to MG   Result Value Ref Range    Sodium 154 (H) 132 - 146 mmol/L    Potassium reflex Magnesium 3.7 3.5 - 5.0 mmol/L    Chloride 121 (H) 98 - 107 mmol/L    CO2 24 22 - 29 mmol/L    Anion Gap 9 7 - 16 mmol/L    Glucose 103 (H) 74 - 99 mg/dL    BUN 36 (H) 8 - 23 mg/dL    CREATININE 0.5 0.5 - 1.0 mg/dL    GFR Non-African American >60 >=60 mL/min/1.73    GFR African American >60     Calcium 8.6 8.6 - 10.2 mg/dL    Total Protein 6.4 6.4 - 8.3 g/dL    Albumin 2.2 (L) 3.5 - 5.2 g/dL    Total Bilirubin 0.3 0.0 - 1.2 mg/dL    Alkaline Phosphatase 92 35 - 104 U/L    ALT 10 0 - 32 U/L    AST 17 0 - 31 U/L   Troponin   Result Value Ref Range    Troponin 0.07 (H) 0.00 - 0.03 ng/mL   Brain Natriuretic Peptide   Result Value Ref Range    Pro-BNP 2,928 (H) 0 - 450 pg/mL   Urinalysis, reflex to microscopic   Result Value Ref Range Lactic Acid, Sepsis 3.8 (H) 0.5 - 1.9 mmol/L   Procalcitonin   Result Value Ref Range    Procalcitonin 0.15 (H) 0.00 - 0.08 ng/mL   Basic Metabolic Panel   Result Value Ref Range    Sodium 152 (H) 132 - 146 mmol/L    Potassium 3.7 3.5 - 5.0 mmol/L    Chloride 121 (H) 98 - 107 mmol/L    CO2 19 (L) 22 - 29 mmol/L    Anion Gap 12 7 - 16 mmol/L    Glucose 109 (H) 74 - 99 mg/dL    BUN 32 (H) 8 - 23 mg/dL    CREATININE 0.4 (L) 0.5 - 1.0 mg/dL    GFR Non-African American >60 >=60 mL/min/1.73    GFR African American >60     Calcium 8.6 8.6 - 10.2 mg/dL   Covid-19, Antibody, Total   Result Value Ref Range    SARS-CoV-2, Total Reactive Non Reactive   Basic Metabolic Panel   Result Value Ref Range    Sodium 154 (H) 132 - 146 mmol/L    Potassium 3.4 (L) 3.5 - 5.0 mmol/L    Chloride 122 (H) 98 - 107 mmol/L    CO2 22 22 - 29 mmol/L    Anion Gap 10 7 - 16 mmol/L    Glucose 112 (H) 74 - 99 mg/dL    BUN 30 (H) 8 - 23 mg/dL    CREATININE 0.4 (L) 0.5 - 1.0 mg/dL    GFR Non-African American >60 >=60 mL/min/1.73    GFR African American >60     Calcium 8.5 (L) 8.6 - 10.2 mg/dL   Hepatic Function Panel   Result Value Ref Range    Total Protein 6.3 (L) 6.4 - 8.3 g/dL    Albumin 2.4 (L) 3.5 - 5.2 g/dL    Alkaline Phosphatase 126 (H) 35 - 104 U/L    ALT 9 0 - 32 U/L    AST 14 0 - 31 U/L    Total Bilirubin 0.3 0.0 - 1.2 mg/dL    Bilirubin, Direct <0.2 0.0 - 0.3 mg/dL    Bilirubin, Indirect see below 0.0 - 1.0 mg/dL   CBC auto differential   Result Value Ref Range    WBC 20.0 (H) 4.5 - 11.5 E9/L    RBC 3.69 3.50 - 5.50 E12/L    Hemoglobin 10.2 (L) 11.5 - 15.5 g/dL    Hematocrit 32.8 (L) 34.0 - 48.0 %    MCV 88.9 80.0 - 99.9 fL    MCH 27.6 26.0 - 35.0 pg    MCHC 31.1 (L) 32.0 - 34.5 %    RDW 16.4 (H) 11.5 - 15.0 fL    Platelets 451 007 - 762 E9/L    MPV 11.0 7.0 - 12.0 fL    Neutrophils % 85.2 (H) 43.0 - 80.0 %    Lymphocytes % 10.4 (L) 20.0 - 42.0 %    Monocytes % 3.5 2.0 - 12.0 %    Eosinophils % 0.2 0.0 - 6.0 % Basophils % 0.5 0.0 - 2.0 %    Neutrophils Absolute 17.20 (H) 1.80 - 7.30 E9/L    Lymphocytes Absolute 2.00 1.50 - 4.00 E9/L    Monocytes Absolute 0.80 0.10 - 0.95 E9/L    Eosinophils Absolute 0.00 (L) 0.05 - 0.50 E9/L    Basophils Absolute 0.00 0.00 - 0.20 E9/L    Myelocyte Percent 0.9 0 - 0 %    Polychromasia 1+     Hypochromia 1+     Poikilocytes 2+     Vu Cells 2+     Ovalocytes 1+    Magnesium   Result Value Ref Range    Magnesium 2.9 (H) 1.6 - 2.6 mg/dL   Phosphorus   Result Value Ref Range    Phosphorus 2.6 2.5 - 4.5 mg/dL   Lipid Panel   Result Value Ref Range    Cholesterol, Total 107 0 - 199 mg/dL    Triglycerides 83 0 - 149 mg/dL    HDL 30 >40 mg/dL    LDL Calculated 60 0 - 99 mg/dL    VLDL Cholesterol Calculated 17 mg/dL   D-dimer, quantitative   Result Value Ref Range    D-Dimer, Quant 881 ng/mL DDU   Lactate, Sepsis   Result Value Ref Range    Lactic Acid, Sepsis 1.2 0.5 - 1.9 mmol/L   POCT glucose   Result Value Ref Range    Glucose 99 mg/dL    QC OK? ok    POCT Glucose   Result Value Ref Range    Meter Glucose 99 74 - 99 mg/dL   EKG 12 Lead   Result Value Ref Range    Ventricular Rate 93 BPM    Atrial Rate 93 BPM    P-R Interval 146 ms    QRS Duration 76 ms    Q-T Interval 358 ms    QTc Calculation (Bazett) 445 ms    P Axis 55 degrees    R Axis 16 degrees    T Axis 74 degrees       RADIOLOGY:  XR CHEST PORTABLE   Final Result   Nasogastric tube placement as above. XR CHEST PORTABLE   Final Result   Addendum 1 of 1   ADDENDUM:   Critical results were called by Dr. South Delgado to nurse Shelia Sullivan on   2/3/2021 at 01:46. Final      XR ABDOMEN FOR NG/OG/NE TUBE PLACEMENT   Final Result   Nasogastric tube probably looped in the distal esophagus and needs to be   repositioned.    Findings were telephoned to the licensed caregiver      CT ABDOMEN PELVIS W IV CONTRAST Additional Contrast? None   Final Result   Diverticulosis of the colon with significantly distended and thickened rectosigmoid colon concerning for proctitis/diverticulitis. Contracted urinary bladder which is displaced anteriorly with wall thickening   concerning for cystitis. Hypodensity in the spleen concerning for splenic infarct. Multifocal infiltrates in the lung bases concerning for pneumonia. CTA PULMONARY W CONTRAST   Final Result   1. There is no evidence of a pulmonary embolus. 2. Multifocal bilateral hazy pulmonary infiltrates. These findings could   represent pneumonia or possibly pneumonia superimposed on pleuroparenchymal   scarring. 3. There is no lung consolidation and there is no pleural effusion or pleural   thickening. XR CHEST PORTABLE   Final Result   Hazy airspace disease, left greater than right. Suspect pneumonia. Atypical   pulmonary edema could give a similar appearance. ------------------------ NURSING NOTES AND VITALS REVIEWED ---------------------------  Date / Time Roomed:  2/2/2021  9:53 AM  ED Bed Assignment:  [id number]    The nursing notes within the ED encounter and vital signs as below have been reviewed.      Patient Vitals for the past 24 hrs:   BP Temp Temp src Pulse Resp SpO2 Height Weight   02/03/21 0759 (!) 140/70 98.8 °F (37.1 °C) Axillary 126 18 100 %     02/03/21 0415 133/77 97 °F (36.1 °C) Axillary 100  94 %     02/03/21 0044 128/66 96.9 °F (36.1 °C) Axillary 91 22 92 %     02/02/21 2013 (!) 127/58 97.8 °F (36.6 °C) Axillary 89 22 99 %     02/02/21 1515 (!) 144/62 99 °F (37.2 °C) Axillary 108 20 100 %     02/02/21 1400 (!) 153/74 97.8 °F (36.6 °C) Axillary 98 23 100 %     02/02/21 1330 (!) 118/57   105 25 90 %     02/02/21 1300 (!) 153/83   103 30 96 %     02/02/21 1115 129/68   96 (!) 32 100 %     02/02/21 0958 (!) 110/50          02/02/21 0955  99.8 °F (37.7 °C) Axillary 90 22 100 % 5' 4\" (1.626 m) 120 lb (54.4 kg)       Oxygen Saturation Interpretation: Normal ------------------------------------------ PROGRESS NOTES ------------------------------------------  Re-evaluation(s):  Time: 1:52 PM EST  Patients symptoms show no change  Repeat physical examination is not changed    Counseling:  I have spoken with the patient and discussed todays results, in addition to providing specific details for the plan of care and counseling regarding the diagnosis and prognosis. Their questions are answered at this time and they are agreeable with the plan of admission.    --------------------------------- ADDITIONAL PROVIDER NOTES ---------------------------------  Consultations:  Time: 1:52 PM EST. Spoke with Dr. Fili Juarez. Discussed case. They will admit the patient. This patient's ED course included: a personal history and physicial examination, re-evaluation prior to disposition, multiple bedside re-evaluations, IV medications and cardiac monitoring    This patient has remained hemodynamically stable during their ED course. Critical care:  Critical Care: Please note that the withdrawal or failure to initiate urgent interventions for this patient would likely result in a life threatening deterioration or permanent disability. Accordingly this patient received 35 minutes of critical care time, excluding separately billable procedures. Diagnosis:  1. Septicemia ([facility] [address]    2. Pneumonia due to organism    3. Diverticulitis of colon    4. Splenic infarct    5. Hypernatremia    6. Dehydration        Disposition:  Patient's disposition: Admit to [address]  Patient's condition is fair.             [address] DO  Resident  02/02/21 8986    ATTENDING PROVIDER ATTESTATION:     Dagoberto Bo presented to the emergency department for evaluation of Fever (from NH, usually demented, SOB and fever per NH) I have reviewed and discussed the case, including pertinent history (medical, surgical, family and social) and exam findings with the Resident and the Nurse assigned to [address] [place] [address]. I have personally performed and/or participated in the history, exam, medical decision making, and procedures and agree with all pertinent clinical information. I have reviewed my findings and recommendations with Teresa Diaz and members of family present at the time of disposition. I, Dr. Brinda Bermudez am the primary physician of record for this patient. MDM: The patient is 80 y.o. female  with a past medical history of       Diagnosis Date    Anemia     Cellulitis     GERD (gastroesophageal reflux disease)     Glaucoma     Hypertension     Hypokalemia     Insomnia     Osteoarthritis     Prediabetes 11/4/2020    Rheumatoid arthritis(714.0)      presenting to the emergency department with a chief complaint of fever altered mental status. Differential diagnosis includes not limited to, electrolyte derangement,  sepsis, pneumonia, Diverticulitis. The patient did have labs and imaging which were reviewed. Patient did have CBC remarkable for leukocytosis of 20.1, CMP remarkable for hypernatremia, patient with troponin 0.07, no ischemic changes on the patient's EKG, urinalysis positive for urinary tract infection, CTA of the chest negative for pulmonary embolus but did demonstrate pneumonia, CT abdomen pelvis did demonstrate diverticulitis. The patient was given IV fluids as well as IV antibiotics and Lovenox for splenic infarct. The patient will be admitted for further treatment and evaluation. Critical care:  Critical Care: Please note that the withdrawal or failure to initiate urgent interventions for this patient would likely result in a life threatening deterioration or permanent disability. Accordingly this patient received  35 minutes of critical care time, excluding separately billable procedures. My findings/plan: The primary encounter diagnosis was Septicemia ([facility] [address] Diagnoses of Pneumonia due to organism, Diverticulitis of colon, Splenic infarct, Hypernatremia, and Dehydration were also pertinent to this visit.   Current Discharge Medication List        Kam Carolina, [facility],   02/03/21 5317

## 2021-02-02 NOTE — CONSULTS
Palliative Care Department  Palliative Care Initial Consult  Provider: Brad MITCHELL  [address] [place] Day: 1  Date of Initial Consult: 2/2/21  Referring Provider: Austin MITCHELL  Palliative Medicine was consulted for assistance with: Code status Discussion, Assist with goals of care and Family Support    Chief Complaint: Tea Keyes is a 80 y.o. female with chief complaint of fever and AMS    HPI:   Tea Keyes is a 80 y.o. female with significant past medical history of dementia, prior hospitalization with COVID during which she had a failure to thrive picture, but improved and returned to the SNF. She was admitted on 2/2/2021 with concern for fever and AMS and she was found to have UTI, hypernatremia, as well as concern for pneumonia and diverticulitis. ASSESSMENT/PLAN:     Pertinent Hospital Diagnoses:  Current medical issues leading to Palliative Medicine involvement include   Active Hospital Problems    Diagnosis Date Noted    Severe sepsis ([facility] [address] [A41.9, R65.20] 02/02/2021     Palliative Care Encounter / Counseling Regarding Goals of Care:  Please see detailed goals of care discussion as below. ? At this time, Tea Keyes, Does Not have capacity for medical decision-making. Capacity is time limited and situation/question specific. ? During encounter the patient's daughter Lilly Cooper was surrogate medical decision-maker  ? Outcome of goals of care meeting: continue current management  ? Code status: DNR-CCA:  DNR form is in epic media and soft chart  ? Advanced Directives: HC-POA  ? Surrogate/Legal NOK:   Bonny Selby () is the daughter, Essence Garner () is the patient's son. ? Will follow and continued to discuss goals of care pending clinical progression.     Referrals: none today    SUBJECTIVE:   Events/Discussions:  2/2/21: Patient seen at the bedside, event present. She is drowsy, but does wake to verbal stimuli briefly, is able to state her name, but otherwise is unable to provide any further history or express any specific complaints. She is noted to be weak with a distended abdomen which is tender to palpation. I did speak with the patient's daughter Vicci Aschoff who is her healthcare power of  and she confirms that her CODE STATUS is DNR CCA. Her DNR form is on her soft chart as well as in epic media. We discussed goals of care further, she would like for her to continue with her current treatment plan, in the hopes that she will improve with antibiotic treatment, as well as management of her fecal impaction. She is hopeful that she will return to her normal appetite, as she states she typically has a very good appetite and eats and drinks very well. Otherwise she states that they have not made any considerations regarding long-term nutritional supplementation via nasoenteric tube or PEG tube, and states that they would like to wait and see how she responds to treatment currently before making further decisions. She is appreciative the support provided bouncers continue to follow provide ongoing support pending further clinical progression. Past Medical History:   Diagnosis Date    Anemia     Cellulitis     GERD (gastroesophageal reflux disease)     Glaucoma     Hypertension     Hypokalemia     Insomnia     Osteoarthritis     Prediabetes 11/4/2020    Rheumatoid arthritis(714.0)        Past Surgical History:   Procedure Laterality Date    EYE SURGERY      cataracts    JOINT REPLACEMENT Left     OTHER SURGICAL HISTORY Right 8/7/2015    IM nailing right femor       Family History   Problem Relation Age of Onset    Heart Failure Mother        Allergies   Allergen Reactions    Food Diarrhea and Rash     Cheese, milk, dairy products. ROS: Patient is unable to fully participate due to altered mentation. OBJECTIVE:   Prognosis: Guarded    Physical Exam:  BP (!) 144/62   Pulse 108   Temp 99 °F (37.2 °C) (Axillary)   Resp 20   Ht 5' 4\" (1.626 m)   Wt 120 lb (54.4 kg)   SpO2 100%   BMI 20.60 kg/m²     Gen: Drowsy, elderly, frail, in no acute distress  HEENT:  Normocephalic, conjunctiva pink, no drainage, mucosa moist  Neck:  Supple  Lungs:  CTA bilaterally, no audible rhonchi or wheezes noted  Heart: Tachycardia noted  Abd:  Tender to palpation, distended, BS+  : Casey  M/S/Ext: Weak, no edema, pulses present  Skin:  Warm and dry  Neuro:  PERRL, drowsy, oriented to person, following commands    Objective data reviewed: labs, images, records, medication use, vitals and chart    Time/Communication:  Greater than 50% of time spent, total 70 minutes in counseling and coordination of care at the bedside regarding goals of care and see above. Uzair MILLERCNP  Palliative Medicine    Patient and the plan of care discussed with the other IDT members of Palliative Care Team, and with patient and family, as appropriate and available. Thank you for allowing Palliative Medicine to participate in the care of Judith Corrales. Note: This report was completed using computerize voiced recognition software. Every effort has been made to ensure accuracy; however, inadvertent computerized transcription errors may be present.

## 2021-02-02 NOTE — CONSULTS
[facility]  Consult Note    [address] [facility] [address]  [place], [address]  Phone [phone]   [phone]      Admit Date: 2021  9:53 AM  Pt Name: Andrea Leggett  MRN: [id number]  : 1925  Reason for Consult:    Chief Complaint   Patient presents with    Fever     from NH, usually demented, SOB and fever per NH     Requesting Physician:  Georgina Louise DO  PCP: Brigette De La Torre MD  History Obtained From:  patient, chart   ID consulted for Severe sepsis ([facility] [address] [A41.9, R65.20]  on hospital day 0  CHIEF COMPLAINT       Chief Complaint   Patient presents with    Fever     from NH, usually demented, SOB and fever per NH     HISTORYOF Nagi Juarez is a 80 y.o. female who presents with significant past medical history of  has a past medical history of Anemia, Cellulitis, GERD (gastroesophageal reflux disease), Glaucoma, Hypertension, Hypokalemia, Insomnia, Osteoarthritis, Prediabetes, and Rheumatoid arthritis(714.0).    ED TRIAGEVITALS  BP: (!) 144/62, Temp: 99 °F (37.2 °C), Pulse: 108, Resp: 20, SpO2: 100 %  HPI  PT IS A POOR HISOTRIAN  SHE CAME FROM [facility]  PER [facility] SHE HAS LUE MIDLINE FOR ivf FOR 3 DAYS  LINE WAS PLACED OVER WEEKEND  NO RECENT ATBX  SHE HAD FEVERS/CHILLS AND RESPIRATORY DISTRESS   TMAC99.8 ON4lL  ON O2  NOT AROUSABLE   EVALUATED PT WITH NURSES      vancomycin (VANCOCIN) 750 mg in dextrose 5 % 250 mL IVPB, Q18H    piperacillin-tazobactam (ZOSYN) 3,375 mg in dextrose 5 % 50 mL IVPB extended infusion (mini-bag), Q8H       REVIEW OF SYSTEMS    (2-9 systems for level 4, 10 or more for level 5)     REVIEW OFSYSTEMS:    CONSTITUTIONAL:   UNABLE TO OBTAIN    Medications Prior to Admission: acetaminophen (TYLENOL) 325 MG tablet, Take 650 mg by mouth every 6 hours as needed for Pain or Fever  donepezil (ARICEPT) 10 MG tablet, Take 10 mg by mouth nightly metoprolol tartrate (LOPRESSOR) 25 MG tablet, Take 25 mg by mouth 2 times daily  Multiple Vitamins-Minerals (THERAPEUTIC MULTIVITAMIN-MINERALS) tablet, Take 1 tablet by mouth daily  sertraline (ZOLOFT) 25 MG tablet, Take 25 mg by mouth daily  Amantadine (SYMMETREL) 100 MG TABS tablet, Take 100 mg by mouth 2 times daily  glucosamine-chondroitin 500-400 MG tablet, Take 1 tablet by mouth daily  Milk Thistle 250 MG CAPS, Take 1 capsule by mouth daily  omeprazole (PRILOSEC) 40 MG delayed release capsule, Take 40 mg by mouth every morning (before breakfast)  guaiFENesin (MUCINEX) 600 MG extended release tablet, Take 600 mg by mouth every 12 hours as needed for Congestion  polyethylene glycol (GLYCOLAX) 17 g packet, Take 17 g by mouth daily  magnesium (MAGNESIUM-OXIDE) 250 MG TABS tablet, Take 250 mg by mouth daily  vitamin E 400 UNIT capsule, Take 400 Units by mouth 2 times daily  Probiotic Product (PROBIOTIC-10 PO), Take 1 capsule by mouth 2 times daily (with meals)  miconazole (MICOTIN) 2 % powder, Apply topically 2 times daily.   coenzyme Q10 100 MG CAPS capsule, Take 100 mg by mouth every evening   memantine ER (NAMENDA XR) 14 MG CP24 extended release capsule, Take 14 mg by mouth daily  Turmeric 500 MG CAPS, Take 1 capsule by mouth daily  divalproex (DEPAKOTE) 125 MG DR tablet, Take 125 mg by mouth 2 times daily   hydrOXYzine (VISTARIL) 25 MG capsule, Take 25 mg by mouth 2 times daily   Cranberry 250 MG TABS, Take 1 capsule by mouth 3 times daily   latanoprost (XALATAN) 0.005 % ophthalmic solution, Place 1 drop into both eyes nightly  melatonin 5 MG TABS tablet, Take 5 mg by mouth nightly  diclofenac sodium (VOLTAREN) 1 % GEL, Apply 2 g topically every 12 hours as needed for Pain Apply topically to the affected areas on knees lower back and shoulders   polyethylene glycol (GLYCOLAX) 17 g packet, Take 17 g by mouth every 48 hours as needed for Constipation   vancomycin (VANCOCIN) 750 mg in dextrose 5 % 250 mL IVPB, 15 mg/kg, Intravenous, Once, Mary Mcelroy DO    piperacillin-tazobactam (ZOSYN) 3,375 mg in dextrose 5 % 50 mL IVPB extended infusion (mini-bag), 3,375 mg, Intravenous, Q8H **AND** [START ON 2/3/2021] 0.9 % sodium chloride infusion, , Intravenous, Q8H, PAUL Hathaway CNP  ALLERGIES     Food  Immunization History   Administered Date(s) Administered    PPD Test 10/06/2015     PAST MEDICAL HISTORY     Past Medical History:   Diagnosis Date    Anemia     Cellulitis     GERD (gastroesophageal reflux disease)     Glaucoma     Hypertension     Hypokalemia     Insomnia     Osteoarthritis     Prediabetes 11/4/2020    Rheumatoid arthritis(714.0)      SURGICAL HISTORY       Past Surgical History:   Procedure Laterality Date    EYE SURGERY      cataracts    JOINT REPLACEMENT Left     OTHER SURGICAL HISTORY Right 8/7/2015    IM nailing right femor     FAMILY HISTORY       Family History   Problem Relation Age of Onset    Heart Failure Mother      SOCIAL HISTORY       Social History     Socioeconomic History    Marital status:       Spouse name: Not on file    Number of children: Not on file    Years of education: Not on file    Highest education level: Not on file   Occupational History    Not on file   Social Needs    Financial resource strain: Not on file    Food insecurity     Worry: Not on file     Inability: Not on file    Transportation needs     Medical: Not on file     Non-medical: Not on file   Tobacco Use    Smoking status: Former Smoker    Smokeless tobacco: Never Used   Substance and Sexual Activity    Alcohol use: No    Drug use: No    Sexual activity: Not on file   Lifestyle    Physical activity     Days per week: Not on file     Minutes per session: Not on file    Stress: Not on file   Relationships    Social connections     Talks on phone: Not on file     Gets together: Not on file Attends Restorationist service: Not on file     Active member of club or organization: Not on file     Attends meetings of clubs or organizations: Not on file     Relationship status: Not on file    Intimate partner violence     Fear of current or ex partner: Not on file     Emotionally abused: Not on file     Physically abused: Not on file     Forced sexual activity: Not on file   Other Topics Concern    Not on file   Social History Narrative    Not on file       PHYSICAL EXAM    (up to 7 for level 4, 8 or more forlevel 5)     ED Triage Vitals   BP Temp Temp Source Pulse Resp SpO2 Height Weight   02/02/21 0958 02/02/21 0955 02/02/21 0955 02/02/21 0955 02/02/21 0955 02/02/21 0955 02/02/21 0955 02/02/21 0955   (!) 110/50 99.8 °F (37.7 °C) Axillary 90 22 100 % 5' 4\" (1.626 m) 120 lb (54.4 kg)     Vitals:    Vitals:    02/02/21 1300 02/02/21 1330 02/02/21 1400 02/02/21 1515   BP: (!) 153/83 (!) 118/57 (!) 153/74 (!) 144/62   Pulse: 103 105 98 108   Resp: 30 25 23 20   Temp:   97.8 °F (36.6 °C) 99 °F (37.2 °C)   TempSrc:   Axillary Axillary   SpO2: 96% 90% 100% 100%   Weight:       Height:         Physical Exam   Constitutional/General: LETHARGIC PALE ON O2  Head: NC/AT  Mouth: Normal mucosa, no thrush   Neck: Supple, full ROM,    Pulmonary: Lungs dec to auscultation bilaterally. Not in respiratory distress POST LEFT BACK RED HAS SEBORRHEA KERATOSSI  Cardiovascular:  Regular rate and rhythm,  murmurs,  Abdomen: Soft/firm right, decBS. No distension. Nontender. Extremities: Moves all extremities x 4.    Warm and well perfused right heel eschar  Pulses:  Distal pulses dec   Skin: Warm and dry without rash POSS PINPOINT ULCER SACRAL NO ERYTHEMA   Neurologic:   NOT AROUSABLE   JACKSON LITTLE CLOUDY   lue picc poa     DIAGNOSTICRESULTS   RADIOLOGY:   Ct Abdomen Pelvis W Iv Contrast Additional Contrast? None    Result Date: 2/2/2021 EXAMINATION: CT OF THE ABDOMEN AND PELVIS WITH CONTRAST 2/2/2021 11:59 am TECHNIQUE: CT of the abdomen and pelvis was performed with the administration of intravenous contrast. Multiplanar reformatted images are provided for review. Dose modulation, iterative reconstruction, and/or weight based adjustment of the mA/kV was utilized to reduce the radiation dose to as low as reasonably achievable. COMPARISON: None. HISTORY: ORDERING SYSTEM PROVIDED HISTORY: abdominal pain TECHNOLOGIST PROVIDED HISTORY: Reason for exam:->abdominal pain Additional Contrast?->None FINDINGS: The lung bases demonstrate COPD with minimal atelectasis/infiltrates concerning for pneumonia including viral infection. There is coronary artery calcification. A hiatal hernia is present. Liver is of normal architecture. Gallbladder is partially distended. A wedge-shaped hypodensity is identified in the spleen concerning for splenic infarct. Pancreas appears normal. Bilateral adrenal nodules are noted with larger 1 on the left measuring 1.6 cm. There is dilated collecting system in the kidneys. There is calcification aorta and degenerative changes in the lumbar spine. Pelvis. There is diffuse diverticulosis of the colon with significantly distended and thickened rectosigmoid colon with the presacral edema. There is mass effect on the bladder which is displaced anteriorly with wall thickening and indwelling Casey catheter. Diverticulosis of the colon with significantly distended and thickened rectosigmoid colon concerning for proctitis/diverticulitis. Contracted urinary bladder which is displaced anteriorly with wall thickening concerning for cystitis. Hypodensity in the spleen concerning for splenic infarct. Multifocal infiltrates in the lung bases concerning for pneumonia.     Xr Chest Portable    Result Date: 2/2/2021 EXAMINATION: CTA OF THE CHEST 2/2/2021 11:59 am TECHNIQUE: CTA of the chest was performed after the administration of intravenous contrast.  Multiplanar reformatted images are provided for review. MIP images are provided for review. Dose modulation, iterative reconstruction, and/or weight based adjustment of the mA/kV was utilized to reduce the radiation dose to as low as reasonably achievable. COMPARISON: None. HISTORY: ORDERING SYSTEM PROVIDED HISTORY: SOB, febrile TECHNOLOGIST PROVIDED HISTORY: Reason for exam:->SOB, febrile Decision Support Exception->Emergency Medical Condition (MA) FINDINGS: Pulmonary Arteries: Pulmonary arteries are adequately opacified for evaluation. No evidence of intraluminal filling defect to suggest pulmonary embolism. Main pulmonary artery is normal in caliber. Mediastinum: No evidence of mediastinal lymphadenopathy. The heart and pericardium demonstrate no acute abnormality. There is no acute abnormality of the thoracic aorta. Lungs/pleura: Multifocal bilateral vague hazy pulmonary infiltrates are noted. While these findings could represent pneumonia they can also represent pneumonia superimposed on parenchymal scarring given the patient's age. There is no pleural thickening or pleural effusion. Upper Abdomen: Limited images of the upper abdomen are unremarkable. There is a moderate sized hiatal hernia. Soft Tissues/Bones: No acute bone or soft tissue abnormality. 1. There is no evidence of a pulmonary embolus. 2. Multifocal bilateral hazy pulmonary infiltrates. These findings could represent pneumonia or possibly pneumonia superimposed on pleuroparenchymal scarring. 3. There is no lung consolidation and there is no pleural effusion or pleural thickening.     LABS  Recent Labs     02/02/21  1016   WBC 20.1*   HGB 11.0*   HCT 35.3   MCV 89.8        Recent Labs     02/02/21  1016 02/02/21  1106   *  --    K 3.7  --    *  --    CO2 24  --    BUN 36*  -- CREATININE 0.5  --    GFRAA >60  --    LABGLOM >60  --    GLUCOSE 103* 99   PROT 6.4  --    LABALBU 2.2*  --    CALCIUM 8.6  --    BILITOT 0.3  --    ALKPHOS 92  --    AST 17  --    ALT 10  --      No results for input(s): PROCAL in the last 72 hours. Lab Results   Component Value Date    CRP 29.2 (H) 11/01/2020    CRP 6.3 (H) 04/10/2012     Lab Results   Component Value Date    SEDRATE 101 (H) 11/01/2020    SEDRATE 35 (H) 07/25/2013    SEDRATE 40 (H) 04/18/2013     No results found for: OOPOEPF3C3  Lab Results   Component Value Date    COVID19 Not Detected 02/02/2021    COVID19 Not Detected 02/02/2021     COVID-19/SIERRA-COV2 LABS  Recent Labs     02/02/21  1016   TROPONINI 0.07*   AST 17   ALT 10     Lab Results   Component Value Date    CHOL 149 05/03/2013    TRIG 69 05/03/2013    HDL 56.0 05/03/2013    LDLCALC 79 05/03/2013         MICROBIOLOGY:     Cultures :   Lab Results   Component Value Date    BC 5 Days no growth 11/01/2020    ORG Lactobacillus 10/22/2015     Lab Results   Component Value Date    BLOODCULT2 5 Days no growth 11/01/2020    ORG Lactobacillus 10/22/2015        Urine Culture, Routine   Date Value Ref Range Status   10/22/2015 75,000 CFU/ml  Final       Patient is a 80 y.o. female who presented with   Chief Complaint   Patient presents with    Fever     from NH, usually demented, SOB and fever per NH        FINAL IMPRESSION    LEUKOCYTOSIS HYPERNATREMIA   1. Diverticulitis of colon    Hypodensity in the spleen concerning for splenic infarct. H/o covid  11/2020   ? UTI WITH INDWELLING JACKSON     R/o cdiff  Has constipation       vancomycin (VANCOCIN) 750 mg in dextrose 5 % 250 mL IVPB, Once    piperacillin-tazobactam (ZOSYN) 3,375 mg in dextrose 5 % 50 mL IVPB extended infusion (mini-bag), Q8H  ADD VANCO PO  SKIN CARE   IF BLOOD CX POSITIVE REMOVE LINE   CHECK FUNGITELL  DDIMER      Imaging and labs were reviewed per medical records and any ID pertinent labs were addressed with the patient. The patient/FAMILY  was educated about the diagnosis, prognosis, indications, risks and benefits of treatment. An opportunity to ask questions was given to the patient/FAMILY and questions were answered. Thank you for involving me in the care of Judith Corrales. Please do not hesitate to call for any questions or concerns.          Electronically signed by Chantale Armstrong MD on 2/2/2021 at 3:53 PM

## 2021-02-02 NOTE — H&P
Department of Internal Medicine  History and Physical Examination     Primary Care Physician: Wilber Kaufman MD   Admitting Physician:  Bob Weems DO  Admission date and time: 2/2/2021  9:53 AM    Room:  04/04  Admitting diagnosis: Severe sepsis ([address] [A41.9, R65.20]    Patient Name: Tea Keyes  MRN: [id number]    Date of Service: 2/2/2021     Chief Complaint:  AMS    HISTORY OF PRESENT ILLNESS:    [place] ill elderly female patient who presented to the emergency department for altered mental status. She had a very extended hospitalization at [facility] in November 2020 where she presented with similar syndrome. She had very poor intake and required significant assistance with eating and drinking. Developed hypernatremia, acute renal failure and suffered from Covid infection as well. At that time, the patient had a CorPak placed for alternative means of hydration and nutrition. PEG tube has been contemplated per their documentation for the patient, with treatment of the underlying disease processes, improved to the point that she was able to become alert enough to receive adequate modified nutritional intake intake to without required. She was transitioned back to skilled nursing facility. She presented to the emergency department today with fever and altered mental status. She reportedly has quite severe dementia and is very limited at baseline. Nursing staff reports her being febrile, tachypneic and tachycardic. ER evaluation was undertaken and revealed severe sepsis due to UTI with hypernatremia and hyperchloremia. CTs of the chest, abdomen pelvis were ordered and pending. CODE STATUS was confirmed as DNR CCA. Unfortunately due to the patient's condition and underlying dementia history cannot be obtained from her and is quite limited.     PAST MEDICAL Hx:  Past Medical History:   Diagnosis Date    Anemia     Cellulitis     GERD (gastroesophageal reflux disease)     Glaucoma hydrOXYzine (VISTARIL) 25 MG capsule Take 25 mg by mouth 2 times daily Am and afternoon for agitation    Historical Provider, MD   Cranberry 250 MG TABS Take 1 capsule by mouth 3 times daily No dose specified    Historical Provider, MD   latanoprost (XALATAN) 0.005 % ophthalmic solution Place 1 drop into both eyes nightly    Historical Provider, MD   melatonin 5 MG TABS tablet Take 5 mg by mouth nightly    Historical Provider, MD   diclofenac sodium (VOLTAREN) 1 % GEL Apply 2 g topically 2 times daily as needed for Pain Apply topically to the affected areas on knees lower back and shoulders    Historical Provider, MD   polyethylene glycol (GLYCOLAX) 17 g packet Take 17 g by mouth every 48 hours as needed for Constipation    Historical Provider, MD   aspirin-acetaminophen-caffeine (Mariano Pagoda) 250-250-65 MG per tablet Take 12 tablets by mouth every 6 hours as needed for Headaches    Historical Provider, MD   QUEtiapine (SEROQUEL) 25 MG tablet Take 25 mg by mouth 2 times daily Indications: 8am and 5pm    Historical Provider, MD   Omega 3 1000 MG CAPS Take 1,000 mg by mouth    Historical Provider, MD   metoprolol (TOPROL-XL) 50 MG XL tablet Take 50 mg by mouth 2 times daily    Historical Provider, MD   amLODIPine (NORVASC) 2.5 MG tablet Take 2.5 mg by mouth daily    Historical Provider, MD   lactase (LACTAID) 3000 UNITS tablet Take 1 tablet by mouth 3 times daily as needed    Historical Provider, MD   acetaminophen (TYLENOL) 500 MG tablet Take 1 tablet by mouth every 4 hours as needed 8/10/15   Linda Milligan DO       ALLERGIES:  Food    SOCIAL Hx:  Social History     Socioeconomic History    Marital status:       Spouse name: Not on file    Number of children: Not on file    Years of education: Not on file    Highest education level: Not on file   Occupational History    Not on file   Social Needs    Financial resource strain: Not on file    Food insecurity     Worry: Not on file Inability: Not on file    Transportation needs     Medical: Not on file     Non-medical: Not on file   Tobacco Use    Smoking status: Former Smoker    Smokeless tobacco: Never Used   Substance and Sexual Activity    Alcohol use: No    Drug use: No    Sexual activity: Not on file   Lifestyle    Physical activity     Days per week: Not on file     Minutes per session: Not on file    Stress: Not on file   Relationships    Social connections     Talks on phone: Not on file     Gets together: Not on file     Attends Nondenominational service: Not on file     Active member of club or organization: Not on file     Attends meetings of clubs or organizations: Not on file     Relationship status: Not on file    Intimate partner violence     Fear of current or ex partner: Not on file     Emotionally abused: Not on file     Physically abused: Not on file     Forced sexual activity: Not on file   Other Topics Concern    Not on file   Social History Narrative    Not on file       ROS: A 12 point review of systems is attempted unsuccessfully due to acuity of illness and superimposed significant dementia. History is largely obtained due to review of the EMR and discussion with the care providers in the emergency department. PHYSICAL EXAM:  VITALS:  Vitals:    02/02/21 1115   BP: 129/68   Pulse: 96   Resp: (!) 32   Temp:    SpO2: 100%         CONSTITUTIONAL:    Obtunded, acutely ill    EYES:    PERRL, EOMI, sclera clear without icterus, conjunctiva normal    ENT:    Normocephalic, atraumatic, sinuses nontender on palpation. External ears without lesions. Oral pharynx with dry mucous membranes.     NECK:    Supple, symmetrical, trachea midline, no adenopathy, thyroid symmetric, not enlarged and no tenderness, skin normal, no bruits, no JVD    HEMATOLOGIC/LYMPHATICS:    No cervical lymphadenopathy and no supraclavicular lymphadenopathy    LUNGS: Symmetric. Shallow and tachypneic pattern of respiration with diminished aeration throughout. CARDIOVASCULAR:    Normal apical impulse, mildly tachycardic but regular, E4-W9, systolic murmur    ABDOMEN:    Flat abdominal contour, normal bowel sounds, soft, palpable mass on the side of the abdomen, no hepatosplenomegaly, no rebound or guarding elicited on palpation     MUSCULOSKELETAL:    There is no redness, warmth, or swelling of the joints. Tone is normal.    NEUROLOGIC:    Obtunded, nonfocal, acutely ill . SKIN:    No bruising or bleeding. Pale with poor turgor likely due to dehydration superimposed on age-related turgor changes. No redness, warmth, or swelling    EXTREMITIES:    Peripheral pulses present. No edema, cyanosis, or swelling.     LABORATORY DATA:  CBC with Differential:    Lab Results   Component Value Date    WBC 20.1 02/02/2021    RBC 3.93 02/02/2021    HGB 11.0 02/02/2021    HCT 35.3 02/02/2021     02/02/2021    MCV 89.8 02/02/2021    MCH 28.0 02/02/2021    MCHC 31.2 02/02/2021    RDW 16.6 02/02/2021    NRBC 1.0 11/12/2020    SEGSPCT 58 05/06/2013    METASPCT 3.5 11/13/2020    LYMPHOPCT 15.7 02/02/2021    PROMYELOPCT 4.0 11/12/2020    MONOPCT 7.0 02/02/2021    MYELOPCT 1.7 02/02/2021    BASOPCT 0.5 02/02/2021    MONOSABS 1.41 02/02/2021    LYMPHSABS 3.22 02/02/2021    EOSABS 0.00 02/02/2021    BASOSABS 0.00 02/02/2021     BMP:    Lab Results   Component Value Date     02/02/2021    K 3.7 02/02/2021     02/02/2021    CO2 24 02/02/2021    BUN 36 02/02/2021    LABALBU 2.2 02/02/2021    LABALBU 4.1 04/10/2012    CREATININE 0.5 02/02/2021    CALCIUM 8.6 02/02/2021    GFRAA >60 02/02/2021    LABGLOM >60 02/02/2021    GLUCOSE 99 02/02/2021    GLUCOSE 87 04/10/2012     Troponin:    Lab Results   Component Value Date    TROPONINI 0.07 02/02/2021     U/A:    Lab Results   Component Value Date    COLORU Straw 02/02/2021    PROTEINU 30 02/02/2021    PHUR 5.5 02/02/2021 45 Otilia Enamorado >20 02/02/2021    WBCUA NONE 11/10/2010    RBCUA 2-5 02/02/2021    RBCUA NONE 11/10/2010    BACTERIA MANY 02/02/2021    CLARITYU CLOUDY 02/02/2021    SPECGRAV >=1.030 02/02/2021    LEUKOCYTESUR MODERATE 02/02/2021    UROBILINOGEN 0.2 02/02/2021    BILIRUBINUR Negative 02/02/2021    BILIRUBINUR NEGATIVE 11/10/2010    BLOODU MODERATE 02/02/2021    GLUCOSEU Negative 02/02/2021    GLUCOSEU NEGATIVE 11/10/2010       ASSESSMENT:  · Severe sepsis associated with acute organ dysfunction  · Urinary tract infection  · Acute respiratory failure with hypoxia in the setting of bilateral pneumonia with recent Covid infection (11/2020)  · Hypovolemic hypernatremia and hyperchloremia due to poor intake and dehydration  · Elevated troponin, likely non-STEMI due to sepsis  · Acute diverticulitis   · Abnormal appearance of the spleen on imaging with concern for splenic infarct   · Mild normocytic normochromic anemia  · Acute metabolic encephalopathy superimposed on advanced dementia    PLAN:

## 2021-02-02 NOTE — CONSULTS
GENERAL SURGERY  CONSULT NOTE  2/2/2021    Physician Consulted: Dr. Glory Spann  Reason for Consult: Splenic Infarct  Referring Physician: GRABIEL Blanco  Daija Manjarrez is a 80 y.o. female with hx severe dementia who presents for evaluation of reported AMS and fevers from nursing facility. She was recently hospitalized for COVID in November at [facility]. Due to severe dementia she is unable to answer any questions, history gathered from chart. Presented afebrile, hemodynamically stable, WBC 20k, +UA. CT in ED revealed possible splenic infarct for which surgery is consulted. Past Medical History:   Diagnosis Date    Anemia     Cellulitis     GERD (gastroesophageal reflux disease)     Glaucoma     Hypertension     Hypokalemia     Insomnia     Osteoarthritis     Prediabetes 11/4/2020    Rheumatoid arthritis(714.0)        Past Surgical History:   Procedure Laterality Date    EYE SURGERY      cataracts    JOINT REPLACEMENT Left     OTHER SURGICAL HISTORY Right 8/7/2015    IM nailing right femor       Medications Prior to Admission    Prior to Admission medications    Medication Sig Start Date End Date Taking?  Authorizing Provider   acetaminophen (TYLENOL) 325 MG tablet Take 650 mg by mouth every 6 hours as needed for Pain or Fever   Yes Historical Provider, MD   donepezil (ARICEPT) 10 MG tablet Take 10 mg by mouth nightly   Yes Historical Provider, MD   metoprolol tartrate (LOPRESSOR) 25 MG tablet Take 25 mg by mouth 2 times daily   Yes Historical Provider, MD   Multiple Vitamins-Minerals (THERAPEUTIC MULTIVITAMIN-MINERALS) tablet Take 1 tablet by mouth daily   Yes Historical Provider, MD   sertraline (ZOLOFT) 25 MG tablet Take 25 mg by mouth daily   Yes Historical Provider, MD   Amantadine (SYMMETREL) 100 MG TABS tablet Take 100 mg by mouth 2 times daily   Yes Historical Provider, MD   glucosamine-chondroitin 500-400 MG tablet Take 1 tablet by mouth daily   Yes Historical Provider, MD Patient's Name/Date of Birth: Jenna Badillo / 11/30/1925    Date: February 3, 2021     Surgeon: Corine Raymundo MD    Chief Complaint: Questionable splenic infarct, fecal impaction    Patient Active Problem List   Diagnosis    DVT (deep venous thrombosis) (HCC)    Normocytic anemia    Rheumatoid arthritis ([address]    Cervical spondylosis    Lumbar spondylosis    Shoulder joint dysfunction    DDD (degenerative disc disease)    Spinal stenosis    Vertebral compression fracture     Bulging lumbar disc    Cervical spinal canal tumor    Dementia associated with other underlying disease with behavioral disturbance ([address]    COVID-19    Prediabetes    Severe sepsis (HCC)       Subjective: Jenna Badillo is a 80 y.o. female with hx severe dementia who presents for evaluation of reported AMS and fevers from nursing facility. She was recently hospitalized for COVID in November at [facility]. Due to severe dementia she is unable to answer any questions, history gathered from chart. Presented afebrile, hemodynamically stable, WBC 20k, +UA. CT in ED revealed possible splenic infarct for which surgery is consulted. Objective:  /77   Pulse 100   Temp 97 °F (36.1 °C) (Axillary)   Resp 22   Ht 5' 4\" (1.626 m)   Wt 120 lb (54.4 kg)   SpO2 94%   BMI 20.60 kg/m²   Labs:  Recent Labs     02/02/21  1016   WBC 20.1*   HGB 11.0*   HCT 35.3     Lab Results   Component Value Date    CREATININE 0.4 (L) 02/02/2021    BUN 32 (H) 02/02/2021     (H) 02/02/2021    K 3.7 02/02/2021     (H) 02/02/2021    CO2 19 (L) 02/02/2021     No results for input(s): LIPASE, AMYLASE in the last 72 hours.   CBC with Differential:    Lab Results   Component Value Date    WBC 20.1 02/02/2021    RBC 3.93 02/02/2021    HGB 11.0 02/02/2021    HCT 35.3 02/02/2021     02/02/2021    MCV 89.8 02/02/2021    MCH 28.0 02/02/2021    MCHC 31.2 02/02/2021    RDW 16.6 02/02/2021    NRBC 1.0 11/12/2020    SEGSPCT 58 05/06/2013 METASPCT 3.5 11/13/2020    LYMPHOPCT 15.7 02/02/2021    PROMYELOPCT 4.0 11/12/2020    MONOPCT 7.0 02/02/2021    MYELOPCT 1.7 02/02/2021    BASOPCT 0.5 02/02/2021    MONOSABS 1.41 02/02/2021    LYMPHSABS 3.22 02/02/2021    EOSABS 0.00 02/02/2021    BASOSABS 0.00 02/02/2021     BMP:    Lab Results   Component Value Date     02/02/2021    K 3.7 02/02/2021    K 3.7 02/02/2021     02/02/2021    CO2 19 02/02/2021    BUN 32 02/02/2021    LABALBU 2.2 02/02/2021    LABALBU 4.1 04/10/2012    CREATININE 0.4 02/02/2021    CALCIUM 8.6 02/02/2021    GFRAA >60 02/02/2021    LABGLOM >60 02/02/2021    GLUCOSE 109 02/02/2021    GLUCOSE 87 04/10/2012       General appearance:  NAD  Head: NCAT, PERRLA, EOMI, red conjunctiva  Neck: supple, no masses  Lungs: CTAB, equal chest rise bilateral  Heart: Reg rate  Abdomen: soft, mildly distended, mildly tender  Skin; no lesions  Gu: no cva tenderness  Extremities: extremities normal, atraumatic, no cyanosis or edema      Assessment/Plan:  Nellie A Darryle Sensor is a 80 y.o. female with significant fecal impaction. I doubt the patient has splenic infarction.   Looks like the finding on CT scan may be the phase of contrast  No evidence of splenic abscess or other abnormalities otherwise and would not need any surgical intervention  NG tube placed, aggressive bowel regimen for fecal impaction  Monitor abdominal exam    Giovanni Reagan MD  2/3/2021  6:52 AM

## 2021-02-02 NOTE — PROGRESS NOTES
Pharmacy Consultation Note  (Antibiotic Dosing and Monitoring)    Initial consult date:   Consulting provider: PAUL Garcia CNP  Drug(s): Vancomycin IV  Indication: Sepsis    Ht Readings from Last 1 Encounters:   21 5' 4\" (1.626 m)     Wt Readings from Last 1 Encounters:   21 120 lb (54.4 kg)       Age/  Gender Actual BW IBW  Allergy Information   80 y.o.     female 54.4 kg 54.7 kg  Food                 Date  WBC BUN/CR UOP Drug/Dose Time   Given Level(s)   (Time) Comments     (#1) 20.1 36/0.5 -- Vancomycin 750 mg IV Q18H 1633       (#2)            (#3)            (#4)            (#5)            (#6)            (#7)            Estimated Creatinine Clearance: 58 mL/min (based on SCr of 0.5 mg/dL). UOP over the past 24 hours:       Intake/Output Summary (Last 24 hours) at 2021 1614  Last data filed at 2021 1412  Gross per 24 hour   Intake 100 ml   Output    Net 100 ml       Temp max: Temp (24hrs), Av.9 °F (37.2 °C), Min:97.8 °F (36.6 °C), Max:99.8 °F (37.7 °C)      Antibiotic Regimen:  Antibiotic Dose Date Initiated   Zosyn   3.375 g IV Q8H      Cultures:  available culture and sensitivity results were reviewed in EPIC  Cultures sent and are pending.   Culture Date Result    Blood cx #1 2/2 In process   Blood cx #2 2/2 In process   Urine cx 2/2 In process   Respiratory Panel 2/2 Negative   Covid-19  Not detected     Assessment:  · Consulted by PAUL Garcia CNP to dose/monitor vancomycin  · Goal trough level:  15-20 mcg/mL  · Pt is a 79 y/o F who presented with AMS  · Serum creatinine today: 0.5; CrCl ~ 58 mL/min; baseline Scr ~ 0.4    Plan:  · Vancomycin 750 mg IV Q18H  · Level prior to fourth dose  · Follow renal function  · Pharmacist will follow and monitor/adjust dosing as necessary      Thank you for the consult,    Tesfaye Castrejon, PharmD 2021 4:22 PM   [phone]

## 2021-02-03 ENCOUNTER — APPOINTMENT (OUTPATIENT)
Dept: GENERAL RADIOLOGY | Age: 86
DRG: 698 | End: 2021-02-03
Payer: MEDICARE

## 2021-02-03 LAB
ALBUMIN SERPL-MCNC: 2.4 G/DL (ref 3.5–5.2)
ALP BLD-CCNC: 126 U/L (ref 35–104)
ALT SERPL-CCNC: 9 U/L (ref 0–32)
ANION GAP SERPL CALCULATED.3IONS-SCNC: 10 MMOL/L (ref 7–16)
AST SERPL-CCNC: 14 U/L (ref 0–31)
BASOPHILS ABSOLUTE: 0 E9/L (ref 0–0.2)
BASOPHILS RELATIVE PERCENT: 0.5 % (ref 0–2)
BILIRUB SERPL-MCNC: 0.3 MG/DL (ref 0–1.2)
BILIRUBIN DIRECT: <0.2 MG/DL (ref 0–0.3)
BILIRUBIN, INDIRECT: ABNORMAL MG/DL (ref 0–1)
BUN BLDV-MCNC: 30 MG/DL (ref 8–23)
BURR CELLS: ABNORMAL
CALCIUM SERPL-MCNC: 8.5 MG/DL (ref 8.6–10.2)
CHLORIDE BLD-SCNC: 122 MMOL/L (ref 98–107)
CHOLESTEROL, TOTAL: 107 MG/DL (ref 0–199)
CO2: 22 MMOL/L (ref 22–29)
CORTISOL TOTAL: 20.13 MCG/DL (ref 2.68–18.4)
CREAT SERPL-MCNC: 0.4 MG/DL (ref 0.5–1)
CREATININE URINE: 58 MG/DL (ref 29–226)
D DIMER: 881 NG/ML DDU
EKG ATRIAL RATE: 93 BPM
EKG P AXIS: 55 DEGREES
EKG P-R INTERVAL: 146 MS
EKG Q-T INTERVAL: 358 MS
EKG QRS DURATION: 76 MS
EKG QTC CALCULATION (BAZETT): 445 MS
EKG R AXIS: 16 DEGREES
EKG T AXIS: 74 DEGREES
EKG VENTRICULAR RATE: 93 BPM
EOSINOPHIL, URINE: 0 % (ref 0–1)
EOSINOPHILS ABSOLUTE: 0 E9/L (ref 0.05–0.5)
EOSINOPHILS RELATIVE PERCENT: 0.2 % (ref 0–6)
GFR AFRICAN AMERICAN: >60
GFR NON-AFRICAN AMERICAN: >60 ML/MIN/1.73
GLUCOSE BLD-MCNC: 112 MG/DL (ref 74–99)
HBA1C MFR BLD: 5.1 % (ref 4–5.6)
HCT VFR BLD CALC: 32.8 % (ref 34–48)
HDLC SERPL-MCNC: 30 MG/DL
HEMOGLOBIN: 10.2 G/DL (ref 11.5–15.5)
HYPOCHROMIA: ABNORMAL
LACTIC ACID, SEPSIS: 1.2 MMOL/L (ref 0.5–1.9)
LACTIC ACID, SEPSIS: 1.6 MMOL/L (ref 0.5–1.9)
LDL CHOLESTEROL CALCULATED: 60 MG/DL (ref 0–99)
LYMPHOCYTES ABSOLUTE: 2 E9/L (ref 1.5–4)
LYMPHOCYTES RELATIVE PERCENT: 10.4 % (ref 20–42)
MAGNESIUM: 2.9 MG/DL (ref 1.6–2.6)
MCH RBC QN AUTO: 27.6 PG (ref 26–35)
MCHC RBC AUTO-ENTMCNC: 31.1 % (ref 32–34.5)
MCV RBC AUTO: 88.9 FL (ref 80–99.9)
MONOCYTES ABSOLUTE: 0.8 E9/L (ref 0.1–0.95)
MONOCYTES RELATIVE PERCENT: 3.5 % (ref 2–12)
MYELOCYTE PERCENT: 0.9 % (ref 0–0)
NEUTROPHILS ABSOLUTE: 17.2 E9/L (ref 1.8–7.3)
NEUTROPHILS RELATIVE PERCENT: 85.2 % (ref 43–80)
OSMOLALITY URINE: 593 MOSM/KG (ref 300–900)
OVALOCYTES: ABNORMAL
PDW BLD-RTO: 16.4 FL (ref 11.5–15)
PHOSPHORUS: 2.6 MG/DL (ref 2.5–4.5)
PLATELET # BLD: 350 E9/L (ref 130–450)
PMV BLD AUTO: 11 FL (ref 7–12)
POIKILOCYTES: ABNORMAL
POLYCHROMASIA: ABNORMAL
POTASSIUM SERPL-SCNC: 3.4 MMOL/L (ref 3.5–5)
RBC # BLD: 3.69 E12/L (ref 3.5–5.5)
SODIUM BLD-SCNC: 154 MMOL/L (ref 132–146)
SODIUM URINE: <20 MMOL/L
TOTAL PROTEIN: 6.3 G/DL (ref 6.4–8.3)
TRIGL SERPL-MCNC: 83 MG/DL (ref 0–149)
UREA NITROGEN, UR: 791 MG/DL (ref 800–1666)
VLDLC SERPL CALC-MCNC: 17 MG/DL
WBC # BLD: 20 E9/L (ref 4.5–11.5)

## 2021-02-03 PROCEDURE — 94640 AIRWAY INHALATION TREATMENT: CPT

## 2021-02-03 PROCEDURE — C9113 INJ PANTOPRAZOLE SODIUM, VIA: HCPCS | Performed by: NURSE PRACTITIONER

## 2021-02-03 PROCEDURE — 80061 LIPID PANEL: CPT

## 2021-02-03 PROCEDURE — 6360000002 HC RX W HCPCS: Performed by: NURSE PRACTITIONER

## 2021-02-03 PROCEDURE — 2060000000 HC ICU INTERMEDIATE R&B

## 2021-02-03 PROCEDURE — 87449 NOS EACH ORGANISM AG IA: CPT

## 2021-02-03 PROCEDURE — 97165 OT EVAL LOW COMPLEX 30 MIN: CPT

## 2021-02-03 PROCEDURE — 2580000003 HC RX 258: Performed by: STUDENT IN AN ORGANIZED HEALTH CARE EDUCATION/TRAINING PROGRAM

## 2021-02-03 PROCEDURE — 84300 ASSAY OF URINE SODIUM: CPT

## 2021-02-03 PROCEDURE — 99232 SBSQ HOSP IP/OBS MODERATE 35: CPT | Performed by: FAMILY MEDICINE

## 2021-02-03 PROCEDURE — 2580000003 HC RX 258: Performed by: NURSE PRACTITIONER

## 2021-02-03 PROCEDURE — 2580000003 HC RX 258: Performed by: SPECIALIST

## 2021-02-03 PROCEDURE — 6370000000 HC RX 637 (ALT 250 FOR IP): Performed by: SPECIALIST

## 2021-02-03 PROCEDURE — 83605 ASSAY OF LACTIC ACID: CPT

## 2021-02-03 PROCEDURE — 83036 HEMOGLOBIN GLYCOSYLATED A1C: CPT

## 2021-02-03 PROCEDURE — 80076 HEPATIC FUNCTION PANEL: CPT

## 2021-02-03 PROCEDURE — 84100 ASSAY OF PHOSPHORUS: CPT

## 2021-02-03 PROCEDURE — 82533 TOTAL CORTISOL: CPT

## 2021-02-03 PROCEDURE — 92610 EVALUATE SWALLOWING FUNCTION: CPT | Performed by: SPEECH-LANGUAGE PATHOLOGIST

## 2021-02-03 PROCEDURE — 97161 PT EVAL LOW COMPLEX 20 MIN: CPT | Performed by: PHYSICAL THERAPIST

## 2021-02-03 PROCEDURE — 85378 FIBRIN DEGRADE SEMIQUANT: CPT

## 2021-02-03 PROCEDURE — 97110 THERAPEUTIC EXERCISES: CPT

## 2021-02-03 PROCEDURE — 80048 BASIC METABOLIC PNL TOTAL CA: CPT

## 2021-02-03 PROCEDURE — 83735 ASSAY OF MAGNESIUM: CPT

## 2021-02-03 PROCEDURE — 6370000000 HC RX 637 (ALT 250 FOR IP): Performed by: NURSE PRACTITIONER

## 2021-02-03 PROCEDURE — 71045 X-RAY EXAM CHEST 1 VIEW: CPT

## 2021-02-03 PROCEDURE — 6370000000 HC RX 637 (ALT 250 FOR IP): Performed by: STUDENT IN AN ORGANIZED HEALTH CARE EDUCATION/TRAINING PROGRAM

## 2021-02-03 PROCEDURE — 6360000002 HC RX W HCPCS: Performed by: SPECIALIST

## 2021-02-03 PROCEDURE — 36415 COLL VENOUS BLD VENIPUNCTURE: CPT

## 2021-02-03 PROCEDURE — 83935 ASSAY OF URINE OSMOLALITY: CPT

## 2021-02-03 PROCEDURE — 82570 ASSAY OF URINE CREATININE: CPT

## 2021-02-03 PROCEDURE — 2700000000 HC OXYGEN THERAPY PER DAY

## 2021-02-03 PROCEDURE — 87205 SMEAR GRAM STAIN: CPT

## 2021-02-03 PROCEDURE — 85025 COMPLETE CBC W/AUTO DIFF WBC: CPT

## 2021-02-03 PROCEDURE — 92526 ORAL FUNCTION THERAPY: CPT | Performed by: SPEECH-LANGUAGE PATHOLOGIST

## 2021-02-03 PROCEDURE — 51702 INSERT TEMP BLADDER CATH: CPT

## 2021-02-03 PROCEDURE — 84540 ASSAY OF URINE/UREA-N: CPT

## 2021-02-03 RX ORDER — 0.9 % SODIUM CHLORIDE 0.9 %
1000 INTRAVENOUS SOLUTION INTRAVENOUS ONCE
Status: COMPLETED | OUTPATIENT
Start: 2021-02-03 | End: 2021-02-03

## 2021-02-03 RX ORDER — POTASSIUM BICARBONATE 25 MEQ/1
50 TABLET, EFFERVESCENT ORAL ONCE
Status: DISCONTINUED | OUTPATIENT
Start: 2021-02-03 | End: 2021-02-03 | Stop reason: CLARIF

## 2021-02-03 RX ORDER — POLYETHYLENE GLYCOL 3350, SODIUM CHLORIDE, POTASSIUM CHLORIDE, SODIUM BICARBONATE, AND SODIUM SULFATE 240; 5.84; 2.98; 6.72; 22.72 G/4L; G/4L; G/4L; G/4L; G/4L
4000 POWDER, FOR SOLUTION ORAL ONCE
Status: DISCONTINUED | OUTPATIENT
Start: 2021-02-03 | End: 2021-02-03

## 2021-02-03 RX ADMIN — BUDESONIDE 500 MCG: 0.5 INHALANT RESPIRATORY (INHALATION) at 06:29

## 2021-02-03 RX ADMIN — Medication: at 08:42

## 2021-02-03 RX ADMIN — Medication 125 MG: at 23:47

## 2021-02-03 RX ADMIN — PIPERACILLIN AND TAZOBACTAM 3375 MG: 3; .375 INJECTION, POWDER, FOR SOLUTION INTRAVENOUS at 21:39

## 2021-02-03 RX ADMIN — PIPERACILLIN AND TAZOBACTAM 3375 MG: 3; .375 INJECTION, POWDER, FOR SOLUTION INTRAVENOUS at 15:37

## 2021-02-03 RX ADMIN — ARFORMOTEROL TARTRATE 15 MCG: 15 SOLUTION RESPIRATORY (INHALATION) at 06:29

## 2021-02-03 RX ADMIN — ARFORMOTEROL TARTRATE 15 MCG: 15 SOLUTION RESPIRATORY (INHALATION) at 18:48

## 2021-02-03 RX ADMIN — POTASSIUM BICARBONATE 40 MEQ: 782 TABLET, EFFERVESCENT ORAL at 12:17

## 2021-02-03 RX ADMIN — ACETAMINOPHEN 650 MG: 325 TABLET, FILM COATED ORAL at 06:02

## 2021-02-03 RX ADMIN — METOPROLOL TARTRATE 25 MG: 25 TABLET, FILM COATED ORAL at 12:27

## 2021-02-03 RX ADMIN — DEXTROSE MONOHYDRATE 750 MG: 50 INJECTION, SOLUTION INTRAVENOUS at 12:16

## 2021-02-03 RX ADMIN — Medication 10 ML: at 21:00

## 2021-02-03 RX ADMIN — Medication 125 MG: at 06:02

## 2021-02-03 RX ADMIN — PIPERACILLIN AND TAZOBACTAM 3375 MG: 3; .375 INJECTION, POWDER, FOR SOLUTION INTRAVENOUS at 06:30

## 2021-02-03 RX ADMIN — METOPROLOL TARTRATE 25 MG: 25 TABLET, FILM COATED ORAL at 20:59

## 2021-02-03 RX ADMIN — Medication 10 ML: at 09:02

## 2021-02-03 RX ADMIN — ENOXAPARIN SODIUM 50 MG: 60 INJECTION SUBCUTANEOUS at 01:01

## 2021-02-03 RX ADMIN — Medication 125 MG: at 17:43

## 2021-02-03 RX ADMIN — SODIUM CHLORIDE: 9 INJECTION, SOLUTION INTRAVENOUS at 03:32

## 2021-02-03 RX ADMIN — MAGNESIUM CITRATE 296 ML: 1.75 LIQUID ORAL at 08:42

## 2021-02-03 RX ADMIN — DOCUSATE SODIUM 50MG AND SENNOSIDES 8.6MG 2 TABLET: 8.6; 5 TABLET, FILM COATED ORAL at 08:42

## 2021-02-03 RX ADMIN — Medication 125 MG: at 12:16

## 2021-02-03 RX ADMIN — ENOXAPARIN SODIUM 50 MG: 60 INJECTION SUBCUTANEOUS at 08:42

## 2021-02-03 RX ADMIN — BUDESONIDE 500 MCG: 0.5 INHALANT RESPIRATORY (INHALATION) at 18:48

## 2021-02-03 RX ADMIN — SODIUM CHLORIDE 1000 ML: 9 INJECTION, SOLUTION INTRAVENOUS at 08:41

## 2021-02-03 RX ADMIN — PANTOPRAZOLE SODIUM 40 MG: 40 INJECTION, POWDER, FOR SOLUTION INTRAVENOUS at 08:42

## 2021-02-03 RX ADMIN — ENOXAPARIN SODIUM 50 MG: 60 INJECTION SUBCUTANEOUS at 20:59

## 2021-02-03 RX ADMIN — MAGNESIUM CITRATE 296 ML: 1.75 LIQUID ORAL at 15:38

## 2021-02-03 ASSESSMENT — PAIN SCALES - GENERAL
PAINLEVEL_OUTOF10: 2
PAINLEVEL_OUTOF10: 0

## 2021-02-03 NOTE — PROGRESS NOTES
Occupational Therapy  OCCUPATIONAL THERAPY INITIAL EVALUATION      Date:2/3/2021  Patient Name: Jean Pierre Sears  MRN: [id number]  : 1925  Room: [address] [place] [address]    Referring Provider: PAUL Dougherty CNP    Evaluating OT: SONAL Thacker/L #[id number]    AM-PAC Daily Activity Raw Score:     Recommended Placement: Subacute   Recommended Adaptive Equipment: TBD     Diagnosis:   1. Septicemia ([address]    2. Pneumonia due to organism    3. Diverticulitis of colon    4. Splenic infarct    5. Hypernatremia    6. Dehydration        Surgery: N/A      Pertinent Medical History:    Past Medical History:   Diagnosis Date    Anemia     Cellulitis     GERD (gastroesophageal reflux disease)     Glaucoma     Hypertension     Hypokalemia     Insomnia     Osteoarthritis     Prediabetes 2020    Rheumatoid arthritis(714.0)       Precautions:  Falls, alarm, NG, limited communication, biting, O2, dementia     Home Living: Pt from nursing home    Prior Level of Function: Assist with ADLs , Assist with IADLs; nonambulatory    Pain Level: No c/o pain  Cognition: Unable to assess due to limited communication. Pt able to answer yes/no questions by end of session. Functional Assessment:   Initial Eval Status  Date: 2/3/21 Treatment Status  Date: STGs = LTGs  Time frame: 5-7 days   Feeding Dependent   BUE contractures. Multiple attempts to get patient to open mouth, pt opening mouth with tactile cues by end of session. Maximal Assist   Grooming Dependent     Maximal Assist    UB Dressing Dependent   Don/doff gown at bed level  Maximal Assist    LB Dressing Dependent     Maximal Assist    Bathing Dependent    Maximal Assist    Toileting Dependent   Pt incontinent of bowel, completed hygiene via rolling   Maximal Assist    Bed Mobility  Supine to sit: NT   Sit to supine: NT  Completed session in chair position.      Supine to sit: Maximal Assist of 2  Sit to supine: Maximal Assist of 2   Functional Transfers NT Functional Mobility NT        Balance Sitting:     Static:  NT    Dynamic:NT  Standing: NT    Pt leaning to L in chair position. Assist with pillow to correct  Sitting:     Static:  Fair-    Dynamic:fair-     Activity Tolerance Poor  Fair-   Visual/  Perceptual Continue to assess. Pt eyes closed throughout session. Hand Dominance Not reported     Strength ROM Additional Info:    RUE  Unable to assess due to contractures Limited in all planes    LUE Unable to assess due to contractures  Limited in all planes          Hearing: [facility]   Sensation:  No c/o numbness or tingling   Tone: Increased tone BUE  Edema: None noted    Comments:   Nursing approved therapy session. Upon arrival, patient supine in bed and agreeable to OT session. Therapist educated pt on role of OT. At end of session, patient supine in bed with call light and phone within reach, alarm on, all lines and tubes intact; nursing aware. Pt demonstrated poor understanding of education/techniques and decreased independence and safety during completion of ADL/functional transfer/mobility tasks. Pt would benefit from continued skilled OT to increase safety and independence with completion of ADL/IADL tasks for functional independence and quality of life. Treatment:   Skilled occupational therapy services provided include instruction/training on safety and adapted techniques for completion of therapeutic exercise. Skilled monitoring of O2 sats, HR, and pt response throughout treatment. Prior to and at the end of session, environmental modifications  completed for patients safety and efficiency of treatment session. ? Therapist facilitated therapeutic exercise within pt tolerance of all joints of BUEs (AAROM; 1x5 reps). BUE contractures. Extended time to open B hands, rags placed to address joint and skin integrity.      Eval Complexity: Low    Assessment of current deficits   Functional mobility [x]  ADLs [x] Strength [x]  Cognition [x] Functional transfers  [x] IADLs [x] Safety Awareness [x]  Endurance [x]  Fine Motor Coordination [x] Balance [x] Vision/perception [] Sensation []   Gross Motor Coordination [x] ROM [x] Delirium []                  Motor Control []    Plan of Care: 1-3 days/week for 1-2 weeks PRN   Instruction/training on adapted ADL techniques and AE recommendations to increase functional independence within precautions  Training on energy conservation strategies/techniques to improve independence/tolerance for self-care routine  Functional transfer/mobility training/DME recommendations for increased independence, safety, and fall prevention  Patient/Family education to increase follow through with safety techniques and functional independence  Recommendation of environmental modifications for increased safety with functional transfers/mobility and ADLs  Cognitive retraining/development of therapeutic activities to improve problem solving, judgement, memory, and attention for increased safety/participation in ADL/IADL tasks  Sensory re-education to improve body/limb awareness, maintain/improve skin integrity, and improve hand/UE motor function  Visual-perceptual training to improve environmental scanning, visual attention/focus, and oculomotor skills for increased safety/independence with functional transfers/mobility and ADLs  Splinting/positioning for increased function, prevention of contractures, and improve skin integrity  Therapeutic exercise to improve motor endurance, ROM, and functional strength for ADLs/functional transfers  Therapeutic activities to facilitate/challenge dynamic balance, stand tolerance, fine motor dexterity/in-hand manipulation for increased independence with ADLs  Neuro-muscular re-education: facilitation of righting/equilibrium reactions, midline orientation, scapular stability/mobility, normalization of muscle tone, and facilitation of volitional active controled movement Positioning to improve functional independence    Rehab Potential: Good for established goals     Patient / Family Goal: Not reported      Patient and/or family were instructed on functional diagnosis, prognosis/goals and OT plan of care. Demonstrated poor understanding. Eval Complexity: Low      Time In: 1115  Time Out: 1137  Total Treatment Time: 8    Min Units   OT Eval Low 97165  X  1   OT Eval Medium 81807      OT Eval High 64667       OT Re-Eval [id number]       Therapeutic Ex 54447  8 1    Therapeutic Activities 90361       ADL/Self Care 11823       Orthotic Management 99900       Neuro Re-Ed 68990       Non-Billable Time          Evaluation Time includes thorough review of current medical information, gathering information on past medical history/social history and prior level of function, completion of standardized testing/informal observation of tasks, assessment of data and education on plan of care and goals.     Chanel Mccormick, OTR/L #[id number]

## 2021-02-03 NOTE — PROGRESS NOTES
Pt had NG placed on days, at 2315  Informed NG was in esophagus, charge nurse placed new NG, re informed NG was in esophagus, I placed NG at 0120 without difficulties, MD on hand at 0520 info relayed to Doyle York MD viewed report at this time, ok to use NG per DR. Harrell, pt safety has been maintained.

## 2021-02-03 NOTE — PROGRESS NOTES
SPEECH/LANGUAGE PATHOLOGY  CLINICAL ASSESSMENT OF SWALLOWING FUNCTION    PATIENT NAME:  Cristofer Mei      :  1925      TODAY'S DATE:  2/3/2021  ROOM:  [address] [place] [address]    SUMMARY OF EVALUATION    RN cleared Pt for participation in assessment?: yes     DYSPHAGIA DIAGNOSIS:  Clinical indicators consistent with moderate dysphagia       DIET RECOMMENDATIONS:  NPO (nothing by mouth including oral meds)   Has NG tube will make further diet recommendations as ability to participate improves     FEEDING RECOMMENDATIONS:     Assistance level:  Not applicable      Compensatory strategies recommended: Not applicable    THERAPY RECOMMENDATIONS:      Dysphagia therapy is recommended 3-5 times per week for LOS or when goals are met. Ongoing evaluation of swallow function to determine when PO diet can be safely initiated    PAST HISTORY OF DYSPHAGIA?: yes  Diet PRIOR to hospital admission:      Unclear was on a modified diet when hospitalized this past 2021  COGNITION: Confusion noted and Attention impaired  Did verbalize a couple of times during session                PROCEDURE     Oral Peripheral Examination   Generalized oral weakness    Current Respiratory Status   4 liters nasal cannula       Parameters of Speech Production  Respiration:  Adequate for speech production  Quality:   Could not test  Intensity: Could not test    Volitional Swallow: not able to follow directions  Volitional Cough:     not able to follow directions    Consistencies Administered During the Evaluation   Liquids: thin liquid--trace tsp sips    Solids:  Not appropriate       Method of Intake:   spoon  Fed by clinician      Position:   Seated, upright                  RESULTS     Oral Stage:         patient with very limited opening to spoon presentations but did take small sips off of the spoon when cold water was presented.        Pharyngeal Stage: No signs of aspiration were noted during this evaluation however, silent aspiration cannot be ruled out at bedside. If silent aspiration is suspected, a Videofluoroscopic Study of Swallowing (MBS) is recommended and requires a physician order. HOWEVER not enough intake for accurate recommendations at this time regarding PO intake                   The Speech Language Pathologist (SLP) completed education with the patient regarding results of evaluation. Explained that Speech Pathology intervention is warranted  at this time   Prognosis for improvements is fair     This plan will be re-evaluated and revised in 1 week  if warranted. Patient stated goals: Could not state,   Treatment goals discussed with Patient   The Patient did not demonstrate understanding of the diagnosis, prognosis and plan of care       CPT code:  68508  bedside swallow eval      [x]The admitting diagnosis and active problem list, as listed below have been reviewed prior to initiation of this evaluation.      ADMITTING DIAGNOSIS: Severe sepsis ([address] [A41.9, R65.20]     ACTIVE PROBLEM LIST:   Patient Active Problem List   Diagnosis    [facility]VT [facility] thrombosis) ([facility])    Normocytic anemia    Rheumatoid arthritis ([address]    Cervical spondylosis    Lumbar spondylosis    Shoulder joint dysfunction    DDD (degenerative disc disease)    Spinal stenosis    Vertebral compression fracture     Bulging lumbar disc    Cervical spinal canal tumor    Dementia associated with other underlying disease with behavioral disturbance ([address]    COVID-19    Prediabetes    Severe sepsis ([address]       Pamela Cancino MSCCC/SLP  Speech Language Pathologist  [phone]

## 2021-02-03 NOTE — PROGRESS NOTES
Pharmacy Consultation Note  (Antibiotic Dosing and Monitoring)    Initial consult date:   Consulting provider: PAUL Brewer CNP  Drug(s): Vancomycin IV  Indication: Sepsis    Ht Readings from Last 1 Encounters:   21 5' 4\" (1.626 m)     Wt Readings from Last 1 Encounters:   21 120 lb (54.4 kg)       Age/  Gender Actual BW IBW  Allergy Information   80 y.o.     female 54.4 kg 54.7 kg  Food                 Date  WBC BUN/CR UOP Drug/Dose Time   Given Level(s)   (Time) Comments     (#1) 20.1 36/0.5 -- Vancomycin 750 mg IV Q18H 1633     2/3  (#2) 20.0 30/0.4 0.5 mL/kg/hr Vancomycin 750 mg IV Q18H 1216       (#3)            (#4)            (#5)            (#6)            (#7)            Estimated Creatinine Clearance: 72 mL/min (A) (based on SCr of 0.4 mg/dL (L)). UOP over the past 24 hours:       Intake/Output Summary (Last 24 hours) at 2/3/2021 1218  Last data filed at 2/3/2021 0917  Gross per 24 hour   Intake 817.5 ml   Output 650 ml   Net 167.5 ml       Temp max: Temp (24hrs), Av °F (36.7 °C), Min:96.9 °F (36.1 °C), Max:99 °F (37.2 °C)      Antibiotic Regimen:  Antibiotic Dose Date Initiated   Zosyn   3.375 g IV Q8H      Cultures:  available culture and sensitivity results were reviewed in EPIC  Cultures sent and are pending.   Culture Date Result    Blood cx #1 2/2 In process   Blood cx #2 2/2 In process   Urine cx 2/2 >100,000 Gram positive cocci   Respiratory Panel 2/2 Negative   Covid-19 2/2 Not detected   Blood cx #3 2/2 In process     Assessment:  · Consulted by PAUL Brewer CNP to dose/monitor vancomycin  · Goal trough level:  15-20 mcg/mL  · Pt is a 81 y/o F who presented with AMS  · Serum creatinine today: 0.4; CrCl ~ 72 mL/min; baseline Scr ~ 0.4    Plan:  · Vancomycin 750 mg IV Q18H  · Level prior to fourth dose  · Follow renal function  · Pharmacist will follow and monitor/adjust dosing as necessary      Thank you for the consult, Lasha Travis, PharmD 2/3/2021 12:18 PM   [phone]

## 2021-02-03 NOTE — PROGRESS NOTES
[facility]  [facility]  Progress Note    NAME: Coleridge Canavan  MR:  [id number]  :   1925  DATE OF SERVICE:21    This is a face to face encounter with Teresa Daiz 80 y.o. female on 21  ID following for   Chief Complaint   Patient presents with    Fever     from NH, usually demented, SOB and fever per NH     SUBJECTIVE:  PT IN BED LETHARGIC   Patient is tolerating medications. No reported adverse drug reactions. Review of systems:  As stated above in the chief complaint, otherwise negative. Medications:  Scheduled Meds:   magnesium citrate  296 mL Per NG tube Once    metoprolol tartrate  25 mg Per NG tube BID    sodium chloride flush  10 mL Intravenous 2 times per day    vancomycin  15 mg/kg Intravenous Q18H    budesonide  0.5 mg Nebulization BID    pantoprazole  40 mg Intravenous Daily    Arformoterol Tartrate  15 mcg Nebulization BID    enoxaparin  1 mg/kg Subcutaneous BID    piperacillin-tazobactam  3,375 mg Intravenous Q8H    sennosides-docusate sodium  2 tablet Oral Daily    magnesium - glycerin - water   Rectal Q8H    vancomycin  125 mg Oral 4 times per day     Continuous Infusions:   0.9% NaCl with KCl 20 mEq 60 mL/hr at 21 1117    sodium chloride Stopped (21 0546)     PRN Meds:sodium chloride flush, acetaminophen **OR** acetaminophen, albuterol, magnesium sulfate, sodium phosphate IVPB **OR** sodium phosphate IVPB, potassium chloride    OBJECTIVE:  /64   Pulse 114   Temp 98.6 °F (37 °C) (Axillary)   Resp 18   Ht 5' 4\" (1.626 m)   Wt 120 lb (54.4 kg)   SpO2 100%   BMI 20.60 kg/m²   Temp  Av °F (36.7 °C)  Min: 96.9 °F (36.1 °C)  Max: 99 °F (37.2 °C)  Constitutional:  The patient is AROUSABLE  Skin:    Warm and dry. No rashes were noted. HEENT:   Round and reactive pupils. AT/NC  Neck:    Supple to movements. Chest:   No use of accessory muscles to breathe. Symmetrical expansion. Cardiovascular:  S1 and S2 are rhythmic and regular. No murmurs appreciated. Abdomen:   Positive bowel sounds to auscultation. Benign to palpation. SOFT NGT  Extremities:   No clubbing, no cyanosis,  Edema.  RIGHT HEEL ESCHAR  CNS    arousable  Lines: pIV    Radiology:  Laboratory and Tests Review:  Lab Results   Component Value Date    WBC 20.0 (H) 02/03/2021    WBC 20.1 (H) 02/02/2021    WBC 10.7 11/13/2020    HGB 10.2 (L) 02/03/2021    HCT 32.8 (L) 02/03/2021    MCV 88.9 02/03/2021     02/03/2021     No results found for: [facility]  Lab Results   Component Value Date    ALT 9 02/03/2021    AST 14 02/03/2021    ALKPHOS 126 (H) 02/03/2021    BILITOT 0.3 02/03/2021     Lab Results   Component Value Date     02/03/2021    K 3.4 02/03/2021    K 3.7 02/02/2021     02/03/2021    CO2 22 02/03/2021    BUN 30 02/03/2021    CREATININE 0.4 02/03/2021    CREATININE 0.4 02/02/2021    CREATININE 0.5 02/02/2021    GFRAA >60 02/03/2021    LABGLOM >60 02/03/2021    GLUCOSE 112 02/03/2021    GLUCOSE 87 04/10/2012    PROT 6.3 02/03/2021    LABALBU 2.4 02/03/2021    LABALBU 4.1 04/10/2012    CALCIUM 8.5 02/03/2021    BILITOT 0.3 02/03/2021    ALKPHOS 126 02/03/2021    AST 14 02/03/2021    ALT 9 02/03/2021     Lab Results   Component Value Date    CRP 29.2 (H) 11/01/2020    CRP 6.3 (H) 04/10/2012     Lab Results   Component Value Date    SEDRATE 101 (H) 11/01/2020    SEDRATE 35 (H) 07/25/2013    SEDRATE 40 (H) 04/18/2013       Microbiology:   Recent Labs     02/02/21  1849   COVID19 Reactive     Lab Results   Component Value Date    BLOODCULT2 5 Days no growth 11/01/2020    ORG Gram positive cocci 02/02/2021    ORG Lactobacillus 10/22/2015     No results found for: [facility]  Recent Labs     02/02/21  1016   ORG Gram positive cocci*     Recent Labs     02/02/21  1016   ORG Gram positive cocci*     Recent Labs     02/02/21  1016   LABURIN >100,000 CFU/ml  Identification and sensitivity to follow ORG Gram positive cocci*        ASSESSMENT/PLAN:  SEPSIS  LEUKOCYTOSIS  HCAP VS ASPIRATION   DIVERTICULITIS  UTI GPC  H/O COVID AB +  splenic infarct.   vancomycin (VANCOCIN) 750 mg in dextrose 5 % 250 mL IVPB, Q18H    piperacillin-tazobactam (ZOSYN) 3,375 mg in dextrose 5 % 50 mL IVPB extended infusion (mini-bag), Q8    vancomycin (VANCOCIN) oral solution 125 mg, 4 times per day    CHECK FINAL CX  NUTRITION       · Monitor labs    Imaging and labs were reviewed per medical records. .  Thank you for involving me in the care of Teresa Diaz I will continue to follow. Please do not hesitate to call for any questions or concerns.     Electronically signed by Vincenzo Lira MD on 2/3/2021 at 1:34 PM

## 2021-02-03 NOTE — PROGRESS NOTES
Speech Language Pathology      NAME:  Jenna Badillo  :  1925  DATE: 2/3/2021  ROOM:  [id number]    Patient seen for swallow therapy 10 minutes. Patient did participate in oral stim with max encouragement and constant verbal coaching as to what the goal of the task was. Will continue POC and make PO diet recommendations when appropriate.      Severe sepsis ([facility] [address] [A41.9, R65.20]    [address]  dysphagia tx    Landy Becerra MSCCC/SLP  Speech Language Pathologist  [id number]

## 2021-02-03 NOTE — PROGRESS NOTES
Internal Medicine Progress Note    FATUMA=[facility]    Lucita Hope. Angela Pearson., JFOORTIZ Thompson D.O., JFOGANESH Bolanos, MSN, APRN, NP-C  Linda Woody. Preston Nogueira, MSN, APRN-CNP     Primary Care Physician: Tanya Harkins MD   Admitting Physician:  Ken Oquendo DO  Admission date and time: 2/2/2021  9:53 AM    Room:  [address] [place] [address]  Admitting diagnosis: Severe sepsis [facility]) [A41.9, R65.20]    Patient Name: Charleen Jiang  MRN: [id number]    Date of Service: 2/3/2021     Subjective:  Vivek Wells is a 80 y.o. female who was seen and examined today,2/3/2021, at the bedside. She is more alert and more easily aroused but remains confused and agitated. NG tube has now been successfully placed and with aggressive bowel regimen she is having bowel movements. Multiple subspecialists are following. Palliative care did provide consultation and this is reviewed, family is awaiting response to treatment before deciding to de-escalate care presently. No family present during my examination. Review of System: Unable to be obtained given current condition    Physical Exam:  I/O this shift:  In: 10 [I.V.:10]  Out: -     Intake/Output Summary (Last 24 hours) at 2/3/2021 0948  Last data filed at 2/3/2021 0902  Gross per 24 hour   Intake 817.5 ml   Output 650 ml   Net 167.5 ml   I/O last 3 completed shifts: In: 807.5 [I.V.:457.5; IV Piggyback:350]  Out: 650 [Urine:650]  Patient Vitals for the past 96 hrs (Last 3 readings):   Weight   02/02/21 0955 120 lb (54.4 kg)     Vital Signs:   Blood pressure (!) 140/70, pulse 126, temperature 98.8 °F (37.1 °C), temperature source Axillary, resp. rate 18, height 5' 4\" (1.626 m), weight 120 lb (54.4 kg), SpO2 100 %. General appearance:  Lethargic but more easily aroused, acute on chronic ill-appearing. Head:  Normocephalic. No masses, lesions or tenderness. Eyes:  PERRLA. EOMI. Sclera clear. Buccal mucosa dry.   ENT: Ears normal. Mucosa normal.  Neck:    Supple. Trachea midline. No thyromegaly. No JVD. No bruits. Heart:    Rhythm regular. Tachycardic rate, systolic murmur  Lungs:    Symmetrical.  Diminished bibasilar, bilateral rhonchi  Abdomen:   Soft. Moderately distended and tender to palpation with a grimace elicited on palpation. Bowel sounds are hypoactive. Extremities:    Peripheral pulses present. No peripheral edema. No ulcers. No cyanosis. No clubbing. Neurologic:    Lethargic, arouses to loud verbal and noxious stimuli. Does not answer questions or follow commands. Agitated. Psych:   Lethargic, agitated. Musculoskeletal:   No joint edema, enlargement or tenderness appreciated gait not assessed. Integumentary:  No rashes  Skin normal color and texture.   Genitalia/Breast:  Deferred    Medication:  Scheduled Meds:   magnesium citrate  296 mL Per NG tube Once    metoprolol tartrate  25 mg Per NG tube BID    sodium chloride flush  10 mL Intravenous 2 times per day    vancomycin  15 mg/kg Intravenous Q18H    budesonide  0.5 mg Nebulization BID    pantoprazole  40 mg Intravenous Daily    Arformoterol Tartrate  15 mcg Nebulization BID    enoxaparin  1 mg/kg Subcutaneous BID    piperacillin-tazobactam  3,375 mg Intravenous Q8H    sennosides-docusate sodium  2 tablet Oral Daily    magnesium - glycerin - water   Rectal Q8H    vancomycin  125 mg Oral 4 times per day     Continuous Infusions:   0.9% NaCl with KCl 20 mEq 75 mL/hr at 02/02/21 1621    sodium chloride Stopped (02/03/21 0546)       Objective Data:  CBC with Differential:    Lab Results   Component Value Date    WBC 20.0 02/03/2021    RBC 3.69 02/03/2021    HGB 10.2 02/03/2021    HCT 32.8 02/03/2021     02/03/2021    MCV 88.9 02/03/2021    MCH 27.6 02/03/2021    MCHC 31.1 02/03/2021    RDW 16.4 02/03/2021    NRBC 1.0 11/12/2020    SEGSPCT 58 05/06/2013    METASPCT 3.5 11/13/2020    LYMPHOPCT 10.4 02/03/2021 PROMYELOPCT 4.0 11/12/2020    MONOPCT 3.5 02/03/2021    MYELOPCT 0.9 02/03/2021    BASOPCT 0.5 02/03/2021    MONOSABS 0.80 02/03/2021    LYMPHSABS 2.00 02/03/2021    EOSABS 0.00 02/03/2021    BASOSABS 0.00 02/03/2021     BMP:    Lab Results   Component Value Date     02/03/2021    K 3.4 02/03/2021    K 3.7 02/02/2021     02/03/2021    CO2 22 02/03/2021    BUN 30 02/03/2021    LABALBU 2.4 02/03/2021    LABALBU 4.1 04/10/2012    CREATININE 0.4 02/03/2021    CALCIUM 8.5 02/03/2021    GFRAA >60 02/03/2021    LABGLOM >60 02/03/2021    GLUCOSE 112 02/03/2021    GLUCOSE 87 04/10/2012     Last 3 Troponin:    Lab Results   Component Value Date    TROPONINI 0.07 02/02/2021    TROPONINI <0.01 11/01/2020    TROPONINI 0.09 11/01/2020     U/A:    Lab Results   Component Value Date    COLORU Straw 02/02/2021    PROTEINU 30 02/02/2021    PHUR 5.5 02/02/2021    WBCUA >20 02/02/2021    WBCUA NONE 11/10/2010    RBCUA 2-5 02/02/2021    RBCUA NONE 11/10/2010    BACTERIA MANY 02/02/2021    CLARITYU CLOUDY 02/02/2021    SPECGRAV >=1.030 02/02/2021    LEUKOCYTESUR MODERATE 02/02/2021    UROBILINOGEN 0.2 02/02/2021    BILIRUBINUR Negative 02/02/2021    BILIRUBINUR NEGATIVE 11/10/2010    BLOODU MODERATE 02/02/2021    GLUCOSEU Negative 02/02/2021    GLUCOSEU NEGATIVE 11/10/2010       Wound Documentation:   Wound 11/01/20 Elbow Right (Active)   Number of days: 93       Wound 11/01/20 Heel Right;Lateral (Active)   Wound Etiology Pressure Unstageable 02/02/21 1716   Dressing Status Intact 02/02/21 2013   Wound Cleansed Not Cleansed 02/02/21 1716   Dressing/Treatment Foam 02/02/21 2013   Dressing Change Due 02/09/21 02/02/21 1716   Wound Assessment Dry;Eschar dry 02/02/21 1716   Drainage Amount None 02/02/21 2013   Odor None 02/02/21 1716   Arelis-wound Assessment Intact 02/02/21 1716   Number of days: 93       Wound 11/01/20 Heel Left;Lateral (Active)   Dressing Status Intact 02/02/21 2013   Dressing/Treatment Foam 02/02/21 2013 Number of days: 93       Wound 02/02/21 Sacrum Mid pinpoint sized (Active)   Wound Etiology Pressure Stage  2 02/02/21 1726   Dressing Status Intact 02/02/21 2013   Wound Cleansed Cleansed with saline 02/02/21 1726   Dressing/Treatment Foam 02/02/21 2013   Dressing Change Due 02/09/21 02/02/21 1726   Wound Length (cm) 0.25 cm 02/02/21 1927   Wound Width (cm) 0.25 cm 02/02/21 1927   Wound Surface Area (cm^2) 0.06 cm^2 02/02/21 1927   Drainage Amount None 02/02/21 2013   Odor None 02/02/21 1726   Arelis-wound Assessment Intact 02/02/21 1726   Number of days: 0       Assessment:  · Severe sepsis associated with acute organ dysfunction  · Urinary tract infection  · Acute respiratory failure with hypoxia in the setting of bilateral pneumonia with recent Covid infection (11/2020)  · Hypovolemic hypernatremia and hyperchloremia due to poor intake and dehydration  · Elevated troponin, likely non-STEMI due to sepsis  · Acute diverticulitis   · Abnormal appearance of the spleen on imaging with concern for splenic infarct   · Mild normocytic normochromic anemia  · Acute metabolic encephalopathy superimposed on advanced dementia    Plan: More than 50% of my  time was spent at the bedside counseling/coordinating care with the patient and/or family with face to face contact. This time was spent reviewing notes and laboratory data as well as instructing and counseling the patient. Time I spent with the family or surrogate(s) is included only if the patient was incapable of providing the necessary information or participating in medical decisions. I also discussed the differential diagnosis and all of the proposed management plans with the patient and individuals accompanying the patientSamantha Moore requires this high level of physician care and nursing on the IMC/Telemetry unit due the complexity of decision management and chance of rapid decline or death. Continued cardiac monitoring and higher level of nursing are required. I am readily available for any further decision-making and intervention.      Ruthie Recio, PAUL - CNP  2/3/2021  9:48 AM

## 2021-02-03 NOTE — PROGRESS NOTES
Palliative Care Department  Palliative Care Progress Note  Provider: ReneAnupam Willis JESSICA Day: 2  Date of Initial Consult: 2/2/21  Referring Provider: Marielena MITCHELL  Palliative Medicine was consulted for assistance with: Code status Discussion, Assist with goals of care and Family Support    Chief Complaint: Lexy Valdez is a 80 y.o. female with chief complaint of fever and AMS    HPI:   Lexy Valdez is a 80 y.o. female with significant past medical history of dementia, prior hospitalization with COVID during which she had a failure to thrive picture, but improved and returned to the SNF. She was admitted on 2/2/2021 with concern for fever and AMS and she was found to have UTI, hypernatremia, as well as concern for pneumonia and diverticulitis. ASSESSMENT/PLAN:     Pertinent Hospital Diagnoses:  Current medical issues leading to Palliative Medicine involvement include   Active Hospital Problems    Diagnosis Date Noted    Severe sepsis ([facility] [address] [A41.9, R65.20] 02/02/2021     Palliative Care Encounter / Counseling Regarding Goals of Care:  Please see detailed goals of care discussion as below. ? At this time, Lexy Valdez, Does Not have capacity for medical decision-making. Capacity is time limited and situation/question specific. ? During encounter the patient's daughter Wendi Blackman was surrogate medical decision-maker  ? Outcome of goals of care meeting: continue current management  ? Code status: DNR-CCA:  DNR form is in epic media and soft chart  ? Advanced Directives: HC-POA  ? Surrogate/Legal NOK:   Delma Boston () daughter/FADUMO García Doertha Quiroz () son. ? Will follow and continued to discuss goals of care pending clinical progression.     Referrals: none today    SUBJECTIVE:   Events/Discussions:

## 2021-02-03 NOTE — DISCHARGE INSTR - COC
C-diff Rule Out 20 CLOSTRIDIUM DIFFICILE EIA (Ordered)   20 Rule-Out Test Resulted    COVID-19  20 Emanuel Almeida RN   20             Nurse Assessment:  Last Vital Signs: BP (!) 140/70   Pulse 126   Temp 98.8 °F (37.1 °C) (Axillary)   Resp 18   Ht 5' 4\" (1.626 m)   Wt 120 lb (54.4 kg)   SpO2 100%   BMI 20.60 kg/m²   Last pulse 102 @ 1400  Last documented pain score (0-10 scale): Pain Level: 0  Last Weight:   Wt Readings from Last 1 Encounters:   21 120 lb (54.4 kg)     Mental Status:  Opens eyes to voice and pain. Is verbal at times.     IV Access:  None    Nursing Mobility/ADLs:  Walking   nonambulatory  Transfer  dependent   Bathing  dependent  Dressing  dependent  Toileting  miller and dependent for bowel  Feeding  dependent - tube feed  Med Admin  N/A  Med Delivery    N/A    Wound Care Documentation and Therapy:  Wound 20 Elbow Right (Active)   Number of days: 93       Wound 20 Heel Right;Lateral (Active)   Wound Etiology Pressure Unstageable 21 1716   Dressing Status Intact 21   Wound Cleansed Not Cleansed 21 1716   Dressing/Treatment Foam 21   Dressing Change Due 21 1716   Wound Assessment Dry;Eschar dry 21 1716   Drainage Amount None 21 2013   Odor None 21 1716   Arelis-wound Assessment Intact 21 1716   Number of days: 93       Wound 20 Heel Left;Lateral (Active)   Dressing Status Intact 21   Dressing/Treatment Foam 21   Number of days: 93       Wound 21 Sacrum Mid pinpoint sized (Active)   Wound Etiology Pressure Stage  2 21 1726   Dressing Status Intact 21   Wound Cleansed Cleansed with saline 21 1726   Dressing/Treatment Foam 21   Dressing Change Due 21 1726   Wound Length (cm) 0.25 cm 02/02/21 1927   Wound Width (cm) 0.25 cm 21 Wound Surface Area (cm^2) 0.06 cm^2 02/02/21 1927   Drainage Amount None 02/02/21 2013   Odor None 02/02/21 1726   Arelis-wound Assessment Intact 02/02/21 1726   Number of days: 0        Elimination:  Continence:   · Bowel: No  · Bladder: no, miller  Urinary Catheter: 2/3/2021 placement date   Colostomy/Ileostomy/Ileal Conduit: No       Date of Last BM: 2/10/2021 - small, loose, brown    Intake/Output Summary (Last 24 hours) at 2/3/2021 1134  Last data filed at 2/3/2021 0917  Gross per 24 hour   Intake 817.5 ml   Output 650 ml   Net 167.5 ml     I/O last 3 completed shifts: In: 807.5 [I.V.:457.5; IV Piggyback:350]  Out: 650 [Urine:650]    Safety Concerns:     N/A    Impairments/Disabilities:      N/A    Nutrition Therapy:  Current Nutrition Therapy:   Jevity 1.2 @ 50 ml/hr with water bolus 120 ml q 6 hr    Routes of Feeding: PEG tube  Liquids: NPO  Daily Fluid Restriction: None  Last Modified Barium Swallow with Video (Video Swallowing Test): N/A    Treatments at the Time of Hospital Discharge:   Respiratory Treatments: 2L  Oxygen Therapy:  Nasal canula  Ventilator:   N/A    Rehab Therapies: None  Weight Bearing Status/Restrictions: Nonambulatory  Other Medical Equipment (for information only, NOT a DME order): Other Treatments:     Patient's personal belongings (please select all that are sent with patient):      RN SIGNATURE:  Lynda Grant RN    CASE MANAGEMENT/SOCIAL WORK SECTION    Inpatient Status Date: ***    Readmission Risk Assessment Score:  Readmission Risk              Risk of Unplanned Readmission:        34           Discharging to Facility/ Agency   · Name: [id number]   · Address:  · Phone: [phone]  · Fax: [phone]    Dialysis Facility (if applicable)   · Name:  · Address:  · Dialysis Schedule:  · Phone:  · Fax:    / signature: Electronically signed by Stella Echols.  Zara Metcalf on 2/3/21 at 11:35 AM EST    PHYSICIAN SECTION    Prognosis: Good Condition at Discharge: Stable    Rehab Potential (if transferring to Rehab): Good    Recommended Labs or Other Treatments After Discharge: ***    Physician Certification: I certify the above information and transfer of Suyz Ott  is necessary for the continuing treatment of the diagnosis listed and that she requires Gabe Nashuel for less 30 days.      Update Admission H&P: {CHP DME Changes in DRNLX:[id number]}    PHYSICIAN SIGNATURE:  Electronically signed by Jorge Faulkner DO on 2/10/21 at 11:18 AM EST

## 2021-02-03 NOTE — FLOWSHEET NOTE
Inpatient Wound Care    Admit Date: 2/2/2021  9:53 AM    Reason for consult:  Bilateral heels    Significant history:    Past Medical History:   Diagnosis Date    Anemia     Cellulitis     GERD (gastroesophageal reflux disease)     Glaucoma     Hypertension     Hypokalemia     Insomnia     Osteoarthritis     Prediabetes 11/4/2020    Rheumatoid arthritis(714.0)        Wound history:      Findings:       02/03/21 1121   Wound 11/01/20 Heel Left;Lateral   Date First Assessed/Time First Assessed: 11/01/20 1945   Present on Hospital Admission: Yes  Location: Heel  Wound Location Orientation: Left;Lateral   Dressing/Treatment Foam   Wound 11/01/20 Heel Right;Lateral   Date First Assessed/Time First Assessed: 11/01/20 1945   Present on Hospital Admission: Yes  Primary Wound Type: Pressure Injury  Location: Heel  Wound Location Orientation: Right;Lateral   Wound Etiology Pressure Unstageable   Dressing/Treatment Foam   Wound Length (cm) 3 cm   Wound Width (cm) 2 cm   Wound Surface Area (cm^2) 6 cm^2   Change in Wound Size % (l*w) 58.45   Wound Assessment Dry;Eschar dry   Drainage Amount None   Odor None   Arelis-wound Assessment Intact   Wound 02/02/21 Sacrum Mid pinpoint sized   Date First Assessed/Time First Assessed: 02/02/21 1600   Present on Hospital Admission: Yes  Primary Wound Type: Pressure Injury  Location: Sacrum  Wound Location Orientation: Mid  Wound Description (Comments): pinpoint sized   Wound Length (cm) 0.25 cm   Wound Width (cm) 0.25 cm   Wound Surface Area (cm^2) 0.06 cm^2   Change in Wound Size % (l*w) 0   Drainage Amount None   Narayan Scale   Sensory Perceptions 1   Moisture 1   Activity 1   Mobility 2   Nutrition 2   Friction and Shear 1   Narayan Scale Score 8   today pt is incontinent of stool  Perirectal area and coccyx is red    Impression:  Right heel unstageable  Left heel sl red      Interventions in place:   mepilex heel dressing  Using protective ointment to coccyx    Plan:

## 2021-02-03 NOTE — PROGRESS NOTES
Physical Therapy    Physical Therapy Initial Evaluation    Room #:  7724/9869-24  Patient Name: Kris Mejias  YOB: 1925  MRN: [id number]    Referring Provider:   PAUL Macedo CNP     Date of Service: 2/3/2021    Evaluating Physical Therapist: Rita Celeste, PT  #[id number]       Diagnosis:   Severe sepsis ([facility] [A41.9, R65.20]   Admitted with  fever , uti    Patient Active Problem List   Diagnosis    DVT [facility]deep venous thrombosis) ([facility])    Normocytic anemia    Rheumatoid arthritis ([address]    Cervical spondylosis    Lumbar spondylosis    Shoulder joint dysfunction    DDD (degenerative disc disease)    Spinal stenosis    Vertebral compression fracture     Bulging lumbar disc    Cervical spinal canal tumor    Dementia associated with other underlying disease with behavioral disturbance ([address]    COVID-19    Prediabetes    Severe sepsis ([address]        Tentative placement recommendation: Skilled Nursing Facility     Equipment recommendation: Equipment at Nursing Home      Prior Level of Function: unknown  Rehab Potential: fair    for baseline    Past medical history:   Past Medical History:   Diagnosis Date    Anemia     Cellulitis     GERD (gastroesophageal reflux disease)     Glaucoma     Hypertension     Hypokalemia     Insomnia     Osteoarthritis     Prediabetes 11/4/2020    Rheumatoid arthritis(714.0)      Past Surgical History:   Procedure Laterality Date    EYE SURGERY      cataracts    JOINT REPLACEMENT Left     OTHER SURGICAL HISTORY Right 8/7/2015    IM nailing right femor       Precautions:  Up with assistance, falls, alarm and O2 , hi mira 4 Liters of o2 , covid in 11/21  dementia    SUBJECTIVE:    Social history: Patient lives in a skilled nursing facility      Equipment owned: ,  unknown    [facility]   How much difficulty turning over in bed?: Unable How much difficulty sitting down on / standing up from a chair with arms?: Unable  How much difficulty moving from lying on back to sitting on side of bed?: Unable  How much help from another person moving to and from a bed to a chair?: Total  How much help from another person needed to walk in hospital room?: Total  How much help from another person for climbing 3-5 steps with a railing?: Total  AM-PAC Inpatient Mobility Raw Score : 6  AM-PAC Inpatient T-Scale Score : 23.55  Mobility Inpatient CMS 0-100% Score: 100  Mobility Inpatient CMS G-Code Modifier : CN    Nursing cleared patient for PT evaluation. The admitting diagnosis and active problem list as listed above have been reviewed prior to the initiation of this evaluation. OBJECTIVE;   Initial Evaluation  Date: 2/3/2021 Treatment Date:     Short Term/ Long Term   Goals   Was pt agreeable to Eval/treatment?  Yes    To be met in 5 days   Pain level   0/10  Grimaces with shoulder rom     Bed Mobility    Rolling: Dependent assist of 1    Supine to sit: Not assessed     Sit to supine: Not assessed     Scooting: Not assessed     Rolling: Maximal assist of 1    Supine to sit: Maximal assist of 1    Sit to supine: Maximal assist of 1    Scooting: Maximal assist of 1     ROM Within functional limits with exception of bilateral shoulders and bilateral ankles    Increase range of motion 10% of affected joints    Strength No active movement observed and Does not follow manual muscle testing commands         Patient is Alert & Oriented x person and does not follow directions  Eyes closed most of session, did verbalize yes     Edema:  no    Endurance:  no       Patient education  Patient educated on role of Physical Therapy, risks of immobility, safety and plan of care      Patient response to education:   Pt verbalized understanding Pt demonstrated skill Pt requires further education in this area   No No Yes Treatment:  Patient practiced and was instructed/facilitated in the following treatment: rom all extremities, joints and planes within available range passively          At end of session, patient in bed with alarm call light and phone within reach,   all lines and tubes intact, nursing notified. Patient would benefit from continued skilled Physical Therapy to improve functional independence and quality of life. Patient's/ family goals   none stated        ASSESSMENT: Patient exhibits decreased strength, and alertness/engagement impairing functional mobility. Assessment. Plan of Care:     -Bed Mobility: Lower extremity exercises , Upper extremity exercises  and Trunk control activities   -Sitting Balance: Incorporate reaching activities to activate trunk muscles , Hands on support to maintain midline , Facilitate active trunk muscle engagement  and Facilitate postural control in all planes     Patient and or family understand(s) diagnosis, prognosis, and plan of care. Frequency of treatments: Patient will be seen  2-3 times/week. Time in  948  Time out  1004    Total Treatment Time  5 minutes    Evaluation time includes thorough review of current medical information, gathering information on past medical history/social history and prior level of function, completion of standardized testing/informal observation of tasks, assessment of data, and development of Plan of care and goals.      CPT codes:  Low Complexity PT evaluation (29237)  Non billable time 5 minutes    Rita Celeste, PT

## 2021-02-03 NOTE — CARE COORDINATION
SS NOTE: COVID NEGATIVE 2/2. This pt is a current Resident of [facility] [phone]/ FAX: [phone]). They are holding a bed for her return there. SW spoke with pt's dtr/ Shanon Rivero [phone] of [facility]. She requests that her mother return to [facility] after dch. For the SNF return pt will need a PRECERT, signed YU and current PT/OT notes. Giovanni Moore. 2/3/2021.11:33 AM.

## 2021-02-04 LAB
(1,3)-BETA-D-GLUCAN (FUNGITELL) INTERPRETATION: NEGATIVE
(1,3)-BETA-D-GLUCAN (FUNGITELL): <31 PG/ML
ALBUMIN SERPL-MCNC: 2.1 G/DL (ref 3.5–5.2)
ALP BLD-CCNC: 134 U/L (ref 35–104)
ALT SERPL-CCNC: 7 U/L (ref 0–32)
ANION GAP SERPL CALCULATED.3IONS-SCNC: 6 MMOL/L (ref 7–16)
ANION GAP SERPL CALCULATED.3IONS-SCNC: 8 MMOL/L (ref 7–16)
ANION GAP SERPL CALCULATED.3IONS-SCNC: 9 MMOL/L (ref 7–16)
AST SERPL-CCNC: 14 U/L (ref 0–31)
BASOPHILS ABSOLUTE: 0.17 E9/L (ref 0–0.2)
BASOPHILS RELATIVE PERCENT: 0.9 % (ref 0–2)
BILIRUB SERPL-MCNC: 0.3 MG/DL (ref 0–1.2)
BILIRUBIN DIRECT: <0.2 MG/DL (ref 0–0.3)
BILIRUBIN, INDIRECT: ABNORMAL MG/DL (ref 0–1)
BUN BLDV-MCNC: 28 MG/DL (ref 8–23)
BUN BLDV-MCNC: 32 MG/DL (ref 8–23)
BUN BLDV-MCNC: 33 MG/DL (ref 8–23)
BURR CELLS: ABNORMAL
CALCIUM SERPL-MCNC: 7.8 MG/DL (ref 8.6–10.2)
CALCIUM SERPL-MCNC: 7.8 MG/DL (ref 8.6–10.2)
CALCIUM SERPL-MCNC: 8.2 MG/DL (ref 8.6–10.2)
CHLORIDE BLD-SCNC: 125 MMOL/L (ref 98–107)
CHLORIDE BLD-SCNC: 127 MMOL/L (ref 98–107)
CHLORIDE BLD-SCNC: 127 MMOL/L (ref 98–107)
CO2: 19 MMOL/L (ref 22–29)
CO2: 19 MMOL/L (ref 22–29)
CO2: 21 MMOL/L (ref 22–29)
CREAT SERPL-MCNC: 0.4 MG/DL (ref 0.5–1)
EOSINOPHILS ABSOLUTE: 0.17 E9/L (ref 0.05–0.5)
EOSINOPHILS RELATIVE PERCENT: 0.9 % (ref 0–6)
GFR AFRICAN AMERICAN: >60
GFR NON-AFRICAN AMERICAN: >60 ML/MIN/1.73
GLUCOSE BLD-MCNC: 128 MG/DL (ref 74–99)
GLUCOSE BLD-MCNC: 129 MG/DL (ref 74–99)
GLUCOSE BLD-MCNC: 169 MG/DL (ref 74–99)
HCT VFR BLD CALC: 30.6 % (ref 34–48)
HEMOGLOBIN: 9.1 G/DL (ref 11.5–15.5)
HYPOCHROMIA: ABNORMAL
LACTIC ACID, SEPSIS: 1.1 MMOL/L (ref 0.5–1.9)
LACTIC ACID, SEPSIS: 1.2 MMOL/L (ref 0.5–1.9)
LACTIC ACID, SEPSIS: 1.3 MMOL/L (ref 0.5–1.9)
LACTIC ACID, SEPSIS: 1.6 MMOL/L (ref 0.5–1.9)
LYMPHOCYTES ABSOLUTE: 2.11 E9/L (ref 1.5–4)
LYMPHOCYTES RELATIVE PERCENT: 11.4 % (ref 20–42)
MAGNESIUM: 3.4 MG/DL (ref 1.6–2.6)
MCH RBC QN AUTO: 27.7 PG (ref 26–35)
MCHC RBC AUTO-ENTMCNC: 29.7 % (ref 32–34.5)
MCV RBC AUTO: 93 FL (ref 80–99.9)
MONOCYTES ABSOLUTE: 1.34 E9/L (ref 0.1–0.95)
MONOCYTES RELATIVE PERCENT: 7 % (ref 2–12)
MYELOCYTE PERCENT: 3.5 % (ref 0–0)
NEUTROPHILS ABSOLUTE: 15.36 E9/L (ref 1.8–7.3)
NEUTROPHILS RELATIVE PERCENT: 76.3 % (ref 43–80)
ORGANISM: ABNORMAL
OVALOCYTES: ABNORMAL
PDW BLD-RTO: 16.8 FL (ref 11.5–15)
PHOSPHORUS: 2.2 MG/DL (ref 2.5–4.5)
PLATELET # BLD: 393 E9/L (ref 130–450)
PMV BLD AUTO: 11.4 FL (ref 7–12)
POIKILOCYTES: ABNORMAL
POLYCHROMASIA: ABNORMAL
POTASSIUM SERPL-SCNC: 3.4 MMOL/L (ref 3.5–5)
POTASSIUM SERPL-SCNC: 3.6 MMOL/L (ref 3.5–5)
POTASSIUM SERPL-SCNC: 3.9 MMOL/L (ref 3.5–5)
PROCALCITONIN: 0.25 NG/ML (ref 0–0.08)
RBC # BLD: 3.29 E12/L (ref 3.5–5.5)
SODIUM BLD-SCNC: 153 MMOL/L (ref 132–146)
SODIUM BLD-SCNC: 154 MMOL/L (ref 132–146)
SODIUM BLD-SCNC: 154 MMOL/L (ref 132–146)
TOTAL PROTEIN: 5.7 G/DL (ref 6.4–8.3)
URINE CULTURE, ROUTINE: ABNORMAL
WBC # BLD: 19.2 E9/L (ref 4.5–11.5)

## 2021-02-04 PROCEDURE — 80076 HEPATIC FUNCTION PANEL: CPT

## 2021-02-04 PROCEDURE — 85025 COMPLETE CBC W/AUTO DIFF WBC: CPT

## 2021-02-04 PROCEDURE — 83735 ASSAY OF MAGNESIUM: CPT

## 2021-02-04 PROCEDURE — 2060000000 HC ICU INTERMEDIATE R&B

## 2021-02-04 PROCEDURE — 99232 SBSQ HOSP IP/OBS MODERATE 35: CPT | Performed by: SURGERY

## 2021-02-04 PROCEDURE — 6360000002 HC RX W HCPCS: Performed by: SPECIALIST

## 2021-02-04 PROCEDURE — 2580000003 HC RX 258: Performed by: NURSE PRACTITIONER

## 2021-02-04 PROCEDURE — 94640 AIRWAY INHALATION TREATMENT: CPT

## 2021-02-04 PROCEDURE — 6370000000 HC RX 637 (ALT 250 FOR IP): Performed by: SPECIALIST

## 2021-02-04 PROCEDURE — 80048 BASIC METABOLIC PNL TOTAL CA: CPT

## 2021-02-04 PROCEDURE — 6370000000 HC RX 637 (ALT 250 FOR IP): Performed by: STUDENT IN AN ORGANIZED HEALTH CARE EDUCATION/TRAINING PROGRAM

## 2021-02-04 PROCEDURE — 97530 THERAPEUTIC ACTIVITIES: CPT

## 2021-02-04 PROCEDURE — 6360000002 HC RX W HCPCS: Performed by: INTERNAL MEDICINE

## 2021-02-04 PROCEDURE — 83605 ASSAY OF LACTIC ACID: CPT

## 2021-02-04 PROCEDURE — 2700000000 HC OXYGEN THERAPY PER DAY

## 2021-02-04 PROCEDURE — 92526 ORAL FUNCTION THERAPY: CPT | Performed by: SPEECH-LANGUAGE PATHOLOGIST

## 2021-02-04 PROCEDURE — 97110 THERAPEUTIC EXERCISES: CPT

## 2021-02-04 PROCEDURE — 2500000003 HC RX 250 WO HCPCS: Performed by: NURSE PRACTITIONER

## 2021-02-04 PROCEDURE — 6360000002 HC RX W HCPCS: Performed by: NURSE PRACTITIONER

## 2021-02-04 PROCEDURE — 80202 ASSAY OF VANCOMYCIN: CPT

## 2021-02-04 PROCEDURE — 6370000000 HC RX 637 (ALT 250 FOR IP): Performed by: NURSE PRACTITIONER

## 2021-02-04 PROCEDURE — C9113 INJ PANTOPRAZOLE SODIUM, VIA: HCPCS | Performed by: NURSE PRACTITIONER

## 2021-02-04 PROCEDURE — 84145 PROCALCITONIN (PCT): CPT

## 2021-02-04 PROCEDURE — 2580000003 HC RX 258: Performed by: SPECIALIST

## 2021-02-04 PROCEDURE — 84100 ASSAY OF PHOSPHORUS: CPT

## 2021-02-04 PROCEDURE — 36415 COLL VENOUS BLD VENIPUNCTURE: CPT

## 2021-02-04 PROCEDURE — 99231 SBSQ HOSP IP/OBS SF/LOW 25: CPT | Performed by: NURSE PRACTITIONER

## 2021-02-04 PROCEDURE — 87040 BLOOD CULTURE FOR BACTERIA: CPT

## 2021-02-04 RX ORDER — POTASSIUM CHLORIDE AND SODIUM CHLORIDE 450; 150 MG/100ML; MG/100ML
INJECTION, SOLUTION INTRAVENOUS CONTINUOUS
Status: DISCONTINUED | OUTPATIENT
Start: 2021-02-04 | End: 2021-02-08

## 2021-02-04 RX ADMIN — DOCUSATE SODIUM 50MG AND SENNOSIDES 8.6MG 2 TABLET: 8.6; 5 TABLET, FILM COATED ORAL at 08:19

## 2021-02-04 RX ADMIN — Medication 125 MG: at 11:57

## 2021-02-04 RX ADMIN — METOPROLOL TARTRATE 25 MG: 25 TABLET, FILM COATED ORAL at 20:59

## 2021-02-04 RX ADMIN — METOPROLOL TARTRATE 25 MG: 25 TABLET, FILM COATED ORAL at 08:19

## 2021-02-04 RX ADMIN — PIPERACILLIN AND TAZOBACTAM 3375 MG: 3; .375 INJECTION, POWDER, FOR SOLUTION INTRAVENOUS at 15:23

## 2021-02-04 RX ADMIN — ARFORMOTEROL TARTRATE 15 MCG: 15 SOLUTION RESPIRATORY (INHALATION) at 07:05

## 2021-02-04 RX ADMIN — SODIUM PHOSPHATE, MONOBASIC, MONOHYDRATE AND SODIUM PHOSPHATE, DIBASIC, ANHYDROUS 8.7 MMOL: 276; 142 INJECTION, SOLUTION INTRAVENOUS at 13:00

## 2021-02-04 RX ADMIN — SODIUM CHLORIDE: 9 INJECTION, SOLUTION INTRAVENOUS at 18:01

## 2021-02-04 RX ADMIN — BUDESONIDE 500 MCG: 0.5 INHALANT RESPIRATORY (INHALATION) at 16:56

## 2021-02-04 RX ADMIN — BUDESONIDE 500 MCG: 0.5 INHALANT RESPIRATORY (INHALATION) at 07:05

## 2021-02-04 RX ADMIN — POTASSIUM CHLORIDE AND SODIUM CHLORIDE: 450; 150 INJECTION, SOLUTION INTRAVENOUS at 12:23

## 2021-02-04 RX ADMIN — Medication 10 ML: at 21:00

## 2021-02-04 RX ADMIN — POTASSIUM CHLORIDE AND SODIUM CHLORIDE: 900; 150 INJECTION, SOLUTION INTRAVENOUS at 03:48

## 2021-02-04 RX ADMIN — ALBUTEROL SULFATE 2.5 MG: 2.5 SOLUTION RESPIRATORY (INHALATION) at 16:57

## 2021-02-04 RX ADMIN — ARFORMOTEROL TARTRATE 15 MCG: 15 SOLUTION RESPIRATORY (INHALATION) at 16:56

## 2021-02-04 RX ADMIN — PIPERACILLIN AND TAZOBACTAM 3375 MG: 3; .375 INJECTION, POWDER, FOR SOLUTION INTRAVENOUS at 22:35

## 2021-02-04 RX ADMIN — POTASSIUM CHLORIDE 10 MEQ: 7.46 INJECTION, SOLUTION INTRAVENOUS at 10:31

## 2021-02-04 RX ADMIN — PIPERACILLIN AND TAZOBACTAM 3375 MG: 3; .375 INJECTION, POWDER, FOR SOLUTION INTRAVENOUS at 06:10

## 2021-02-04 RX ADMIN — Medication 125 MG: at 17:55

## 2021-02-04 RX ADMIN — ENOXAPARIN SODIUM 50 MG: 60 INJECTION SUBCUTANEOUS at 08:20

## 2021-02-04 RX ADMIN — POTASSIUM CHLORIDE 10 MEQ: 7.46 INJECTION, SOLUTION INTRAVENOUS at 13:39

## 2021-02-04 RX ADMIN — POTASSIUM CHLORIDE 10 MEQ: 7.46 INJECTION, SOLUTION INTRAVENOUS at 08:46

## 2021-02-04 RX ADMIN — SODIUM CHLORIDE: 9 INJECTION, SOLUTION INTRAVENOUS at 10:39

## 2021-02-04 RX ADMIN — DEXTROSE MONOHYDRATE 750 MG: 50 INJECTION, SOLUTION INTRAVENOUS at 03:43

## 2021-02-04 RX ADMIN — Medication 10 ML: at 08:19

## 2021-02-04 RX ADMIN — Medication 125 MG: at 05:36

## 2021-02-04 RX ADMIN — POTASSIUM CHLORIDE 10 MEQ: 7.46 INJECTION, SOLUTION INTRAVENOUS at 12:02

## 2021-02-04 RX ADMIN — SODIUM CHLORIDE: 9 INJECTION, SOLUTION INTRAVENOUS at 01:34

## 2021-02-04 RX ADMIN — ENOXAPARIN SODIUM 50 MG: 60 INJECTION SUBCUTANEOUS at 20:59

## 2021-02-04 RX ADMIN — PANTOPRAZOLE SODIUM 40 MG: 40 INJECTION, POWDER, FOR SOLUTION INTRAVENOUS at 08:19

## 2021-02-04 NOTE — PROGRESS NOTES
Comprehensive Nutrition Assessment    Type and Reason for Visit:  Initial, RD Nutrition Re-Screen/LOS    Nutrition Recommendations/Plan: Modify Tube Feeding: Recommend Standard TF w/Fiber(Jevity 1.2) @ 50ml/hr x24hrs to provide: 1200mlTV/1440kcal/67gm pro/968ml FW: Additonal 120ml water flush every 6 hours to provide 480ml fluid for total fluid= 1480ml/d. Nutrition Assessment:  Pt remains NPO and receiving tube feedings without difficulty at this time, Pt. admits w/ Dx: Severe Sepsis w/multiple medical etiologies,pt at further nutritional risk d/t increased needs from wound healing. PMH: Severe Dementia, RA, Aspiration pneumonia ; recommend to continue current tube feeds at this time    Malnutrition Assessment:  Malnutrition Status: At risk for malnutrition (Comment)    Context:  Chronic Illness     Findings of the 6 clinical characteristics of malnutrition:  Energy Intake:  7 - 75% or less estimated energy requirements for 1 month or longer  Weight Loss:  No significant weight loss     Body Fat Loss:  No significant body fat loss     Muscle Mass Loss:  No significant muscle mass loss    Fluid Accumulation:  No significant fluid accumulation     Strength:  Not Performed    Estimated Daily Nutrient Needs:  Energy (kcal):  1300-1400kcal/d; Weight Used for Energy Requirements:  Admission     Protein (g):  65-75gm pro/d(x1.2-1.4gm/kg);  Weight Used for Protein Requirements:  Ideal        Fluid (ml/day):  1300-1400ml/d; Method Used for Fluid Requirements:  1 ml/kcal      Nutrition Related Findings:  Pt alert to person, Missing teeth, I/O WNL, Generalized Edema trace,Abd distended/soft, NGT,RUQ/LUQ +BS, RLQ/LLQ present: audible      Wounds:  Multiple, Pressure Injury, Unstageable       Current Nutrition Therapies:    Current Tube Feeding (TF) Orders:  · Feeding Route: Nasogastric  · Formula: Standard w/Fiber  · Water Flushes: per critical care · Current TF & Flush Orders Provides: 480mlTV/576kcal/27gm pro/387ml FW      Anthropometric Measures:  · Height: 5' 4\" (162.6 cm)  · Current Body Weight: 146 lb (66.2 kg)(2/4)   · Admission Body Weight: 146 lb (66.2 kg)(2/4 bed scale)    · Usual Body Weight: 115 lb (52.2 kg)(11/1/20 115#)     · Ideal Body Weight: 120 lbs; % Ideal Body Weight 121.7 %   · BMI: 25  · Adjusted Body Weight:  ; No Adjustment   · BMI Categories: Overweight (BMI 25.0-29. 9)       Nutrition Diagnosis:   · Inadequate energy intake related to cognitive or neurological impairment as evidenced by NPO or clear liquid status due to medical condition, nutrition support - enteral nutrition, wounds, lab values, GI abnormality      Nutrition Interventions:   Food and/or Nutrient Delivery:  Continue NPO, Modify Tube Feeding(Recommend Standard TF w/Fiber(Jevity 1.2) @ 50ml/hr x24hrs to provide: 1200mlTV/1440kcal/67gm pro/968ml FW: Additonal 120ml water flush every6 hours to provide 480ml fluid for total fluid= 1480ml/d)  Nutrition Education/Counseling:  No recommendation at this time   Coordination of Nutrition Care:  Continue to monitor while inpatient    Goals:  EN Tolerance       Nutrition Monitoring and Evaluation:   Behavioral-Environmental Outcomes:  None Identified   Food/Nutrient Intake Outcomes:  Enteral Nutrition Intake/Tolerance  Physical Signs/Symptoms Outcomes:  Biochemical Data, Chewing or Swallowing, GI Status, Fluid Status or Edema, Hemodynamic Status, Nutrition Focused Physical Findings, Weight, Skin     Discharge Planning:     Too soon to determine     Electronically signed by Trina Dakins, RD, LD on 2/4/21 at 12:06 PM EST    Contact: [phone]

## 2021-02-04 NOTE — PLAN OF CARE
Problem: Inadequate energy intake (NI-1.4)  Goal: Food and/or Nutrient Delivery  Description: EN Tolerance  Outcome: Met This Shift

## 2021-02-04 NOTE — PROGRESS NOTES
Physician Progress Note      [facility]  CSN #:                  [id number]  :                       1925  ADMIT DATE:       2021 9:53 AM  DISCH DATE:  RESPONDING  PROVIDER #:        Yisel SALCIDO DO          QUERY TEXT:    Pt admitted with UTI. Pt noted to have chronic indwelling urinary catheter. If possible, please document in the progress notes and discharge summary if   you are evaluating and/or treating any of the following: The medical record reflects the following:  Risk Factors: Pt has UTI, Sepsis with hx of miller cath placed 20. Clinical Indicators:  UA  Straw, CLOUDY, mod Blood, mod leukocyte est, WBC   >20, RBC 2-5, few Epithelial, many bacteria. Treatment: IV Zosyn, Vanc, PO Vanc as well. Thank you, Nader Remy RN [facility] [phone]  Options provided:  -- UTI due to chronic indwelling urinary catheter  -- UTI not due to indwelling urinary catheter  -- Other - I will add my own diagnosis  -- Disagree - Not applicable / Not valid  -- Disagree - Clinically unable to determine / Unknown  -- Refer to Clinical Documentation Reviewer    PROVIDER RESPONSE TEXT:    UTI is due to the chronic indwelling urinary catheter.     Query created by: Marcie Alaniz on 2/3/2021 10:58 AM      Electronically signed by:  Klaus Ortega DO 2021 4:49 PM

## 2021-02-04 NOTE — PROGRESS NOTES
[facility]  [facility]  Progress Note    NAME: Jean Pierre Sears  MR:  [id number]  :   1925  DATE OF SERVICE:21    This is a face to face encounter with Teresa Diaz 80 y.o. female on 21  ID following for   Chief Complaint   Patient presents with    Fever     from NH, usually demented, SOB and fever per NH     SUBJECTIVE:  PT IN BED LETHARGIC   Afebrile ngt  4L  Patient is tolerating medications. No reported adverse drug reactions. Review of systems:  As stated above in the chief complaint, otherwise negative. Medications:  Scheduled Meds:   metoprolol tartrate  25 mg Per NG tube BID    sodium chloride flush  10 mL Intravenous 2 times per day    vancomycin  15 mg/kg Intravenous Q18H    budesonide  0.5 mg Nebulization BID    pantoprazole  40 mg Intravenous Daily    Arformoterol Tartrate  15 mcg Nebulization BID    enoxaparin  1 mg/kg Subcutaneous BID    piperacillin-tazobactam  3,375 mg Intravenous Q8H    sennosides-docusate sodium  2 tablet Oral Daily    vancomycin  125 mg Oral 4 times per day     Continuous Infusions:   0.9% NaCl with KCl 20 mEq 60 mL/hr at 21 0900    sodium chloride 12.5 mL/hr at 21 1039     PRN Meds:sodium chloride flush, acetaminophen **OR** acetaminophen, albuterol, magnesium sulfate, sodium phosphate IVPB **OR** sodium phosphate IVPB, potassium chloride    OBJECTIVE:  /77   Pulse 121   Temp 98.6 °F (37 °C) (Axillary)   Resp 20   Ht 5' 4\" (1.626 m)   Wt 146 lb 6 oz (66.4 kg)   SpO2 99%   BMI 25.13 kg/m²   Temp  Av.7 °F (36.5 °C)  Min: 97.2 °F (36.2 °C)  Max: 98.6 °F (37 °C)  Constitutional:  The patient is AROUSABLE  Skin:    Warm and dry. No rashes were noted. HEENT:      AT/NC    Chest:   No use of accessory muscles to breathe. Symmetrical expansion. Cardiovascular:  S1 and S2 are rhythmic and regular. No murmurs appreciated. Abdomen:   Positive bowel sounds to auscultation. Benign to palpation. SOFT NGT distended  Extremities:     Edema.  RIGHT HEEL ESCHAR  CNS    lethargic  Lines: pIV    Radiology:  Laboratory and Tests Review:  Lab Results   Component Value Date    WBC 19.2 (H) 02/04/2021    WBC 20.0 (H) 02/03/2021    WBC 20.1 (H) 02/02/2021    HGB 9.1 (L) 02/04/2021    HCT 30.6 (L) 02/04/2021    MCV 93.0 02/04/2021     02/04/2021     No results found for: [facility]  Lab Results   Component Value Date    ALT 7 02/04/2021    AST 14 02/04/2021    ALKPHOS 134 (H) 02/04/2021    BILITOT 0.3 02/04/2021     Lab Results   Component Value Date     02/04/2021    K 3.4 02/04/2021    K 3.7 02/02/2021     02/04/2021    CO2 19 02/04/2021    BUN 32 02/04/2021    CREATININE 0.4 02/04/2021    CREATININE 0.4 02/04/2021    CREATININE 0.4 02/03/2021    GFRAA >60 02/04/2021    LABGLOM >60 02/04/2021    GLUCOSE 169 02/04/2021    GLUCOSE 87 04/10/2012    PROT 5.7 02/04/2021    LABALBU 2.1 02/04/2021    LABALBU 4.1 04/10/2012    CALCIUM 7.8 02/04/2021    BILITOT 0.3 02/04/2021    ALKPHOS 134 02/04/2021    AST 14 02/04/2021    ALT 7 02/04/2021     Lab Results   Component Value Date    CRP 29.2 (H) 11/01/2020    CRP 6.3 (H) 04/10/2012     Lab Results   Component Value Date    SEDRATE 101 (H) 11/01/2020    SEDRATE 35 (H) 07/25/2013    SEDRATE 40 (H) 04/18/2013       Microbiology:   Recent Labs     02/02/21  1849   COVID19 Reactive     Lab Results   Component Value Date    BLOODCULT2 24 Hours no growth 02/02/2021    BLOODCULT2 5 Days no growth 11/01/2020    ORG Enterococcus faecalis 02/02/2021    ORG Lactobacillus 10/22/2015     No results found for: WNDABS  Recent Labs     02/02/21  1016   ORG Enterococcus faecalis*     Recent Labs     02/02/21  1016   ORG Enterococcus faecalis*     Recent Labs     02/02/21  1016   LABURIN >100,000 CFU/ml   ORG Enterococcus faecalis*        ASSESSMENT/PLAN:  SEPSIS  LEUKOCYTOSIS  HCAP VS ASPIRATION DIVERTICULITIS/proctitis  UTI GPC E faecalis  H/O COVID AB +  splenic infarct.   vancomycin (VANCOCIN) 750 mg in dextrose 5 % 250 mL IVPB, Q18H can stop     piperacillin-tazobactam (ZOSYN) 3,375 mg in dextrose 5 % 50 mL IVPB extended infusion (mini-bag), Q8    vancomycin (VANCOCIN) oral solution 125 mg, 4 times per day    CHECK FINAL CX  NUTRITION       · Monitor labs    Imaging and labs were reviewed per medical records. .  Thank you for involving me in the care of Teresa Diaz I will continue to follow. Please do not hesitate to call for any questions or concerns.     Electronically signed by Maksim Parmar MD on 2/4/2021 at 11:17 AM     addenum  BLOOD CX +GPC   WILL REPEAT CONT VANCO  IV [place] [facility] [place]  3:49 PM

## 2021-02-04 NOTE — PROGRESS NOTES
Pharmacy Consultation Note  (Antibiotic Dosing and Monitoring)    Initial consult date:   Consulting provider: PAUL Connelly CNP  Drug(s): Vancomycin IV  Indication: Sepsis    Ht Readings from Last 1 Encounters:   21 5' 4\" (1.626 m)     Wt Readings from Last 1 Encounters:   21 146 lb 6 oz (66.4 kg)       Age/  Gender Actual BW IBW  Allergy Information   80 y.o.     female 54.4 kg 54.7 kg  Food                 Date  WBC BUN/CR UOP Drug/Dose Time   Given Level(s)   (Time) Comments     (#1) 20.1 36/0.5 -- Vancomycin 750 mg IV Q18H 1633     2/3  (#2) 20.0 30/0.4 0.5 mL/kg/hr Vancomycin 750 mg IV Q18H 1216       (#3) 19.2 32/0.4 0.48 mL/kg/hr Vancomycin 750 mg IV Q18H 0343  <2145> Trough @ 2115 Please hold the following dose if level is >20 mcg/mL     (#4)            (#5)            (#6)            (#7)            Estimated Creatinine Clearance: 79 mL/min (A) (based on SCr of 0.4 mg/dL (L)). UOP over the past 24 hours:       Intake/Output Summary (Last 24 hours) at 2021 1125  Last data filed at 2021 0900  Gross per 24 hour   Intake 1142 ml   Output 770 ml   Net 372 ml       Temp max: Temp (24hrs), Av.7 °F (36.5 °C), Min:97.2 °F (36.2 °C), Max:98.6 °F (37 °C)      Antibiotic Regimen:  Antibiotic Dose Date Initiated   Zosyn   3.375 g IV Q8H      Cultures:  available culture and sensitivity results were reviewed in EPIC  Cultures sent and are pending.   Culture Date Result    Blood cx #1 2/2 In process   Blood cx #2 2/2 In process   Urine cx 2/2 >100,000 Gram positive cocci   Respiratory Panel 2/2 Negative   Covid-19 2/2 Not detected   Blood cx #3 2/2 In process     Assessment:  · Consulted by PAUL Connelly CNP to dose/monitor vancomycin  · Goal trough level:  15-20 mcg/mL  · Pt is a 79 y/o F who presented with AMS  · Serum creatinine today: 0.4; CrCl ~ 79 mL/min; baseline Scr ~ 0.4    Plan:  · Vancomycin 750 mg IV Q18H · Trough tonight @ 2115; Please hold the following dose if level is >20 mcg/mL  · Follow renal function  · Pharmacist will follow and monitor/adjust dosing as necessary      Thank you for the consult,    Lasha Travis, PharmD 2/4/2021 11:25 AM   [phone]

## 2021-02-04 NOTE — PROGRESS NOTES
Palliative Care Department  Palliative Care Progress Note  Provider: Abel MITCHELL  [address]: [address]  Date of Initial Consult: 2/2/21  Referring Provider: Zachary MITCHELL  Palliative Medicine was consulted for assistance with: Code status Discussion, Assist with goals of care and Family Support    Chief Complaint: Kam Rodriguez is a 80 y.o. female with chief complaint of fever and AMS    HPI:   Kam Rodriguez is a 80 y.o. female with significant past medical history of dementia, prior hospitalization with COVID during which she had a failure to thrive picture, but improved and returned to the SNF. She was admitted on 2/2/2021 with concern for fever and AMS and she was found to have UTI, hypernatremia, as well as concern for pneumonia and diverticulitis. ASSESSMENT/PLAN:     Pertinent Hospital Diagnoses:  Current medical issues leading to Palliative Medicine involvement include   Active Hospital Problems    Diagnosis Date Noted    Anemia [D64.9] 05/03/2013     Priority: Medium    Elevated troponin [R77.8]     Hypernatremia [E87.0]     Altered mental status [R41.82]     Fecal impaction ([facility] [address] [K56.41]     Severe sepsis ([facility] [address] [A41.9, R65.20] 02/02/2021     Palliative Care Encounter / Counseling Regarding Goals of Care:  Please see detailed goals of care discussion as below. ? At this time, Kam Rodriguez, Does Not have capacity for medical decision-making. Capacity is time limited and situation/question specific. ? During encounter the patient's daughter Favian Mcelroy was surrogate medical decision-maker  ? Outcome of goals of care meeting: continue current management  ? Code status: DNR-CCA:  DNR form is in epic media and soft chart  ? Advanced Directives: HC-POA  ? Surrogate/Legal NOK:   Lorrene Bosworth () daughter/JOLENEBRISEYDA Powers () son. ? Will follow and continued to discuss goals of care pending clinical progression.     Referrals: none today SUBJECTIVE:   Events/Discussions:  2/3/21:  Spoke with daughter Julisa Gibson, who states she is quite happy that patient is now having BMs. She feels that patient has more time before she would have to make a decision regarding PEG tube and isn't even thinking that she would want to pursue that right norow. Her goal is f patient to improve symptomatically and go back to nursing facility. 2/4/21:  Patient seen at bedside, no family present. Her overall condition appears to be unchanged. She continues to be somnolent and confused. Her abdomen is softer but continues to be tender to palpation. As per previous conversation with family her CODE STATUS is DNR CCA. Goal at this time is continue with all of her other care unchanged in the hopes that her mental status will improve and she will begin to eat adequately again by mouth. Team continue to follow and provide ongoing support regarding goals of care pending her further clinical progression. OBJECTIVE:   Prognosis: Guarded    Physical Exam:  BP (!) 158/91   Pulse 108   Temp 97.9 °F (36.6 °C) (Infrared)   Resp 20   Ht 5' 4\" (1.626 m)   Wt 146 lb 6 oz (66.4 kg)   SpO2 100%   BMI 25.13 kg/m²     Gen: elderly, frail, in no acute distress  HEENT:  Normocephalic, conjunctiva pink, no drainage, mucosa moist  Neck:  Supple  Lungs:  CTA bilaterally, no audible rhonchi or wheezes noted  Heart: Tachycardia noted  Abd:  Nontender, hypoactive BS  : Casey  M/S/Ext: Weak, no edema, pulses present  Skin:  Warm and dry  Neuro:  PERRL, drowsy, oriented to person, following commands    Objective data reviewed: labs, images, records, medication use, vitals and chart    Time/Communication:  Greater than 50% of time spent, total 15 minutes in counseling and coordination of care at the bedside regarding goals of care and see above.     Iglesia MILLER-CNP  Palliative Medicine Patient and the plan of care discussed with the other IDT members of Palliative Care Team, and with patient and family, as appropriate and available. Thank you for allowing Palliative Medicine to participate in the care of Judith Corrales.

## 2021-02-04 NOTE — PROGRESS NOTES
Internal Medicine Progress Note    [facility]    Merissa Richmond. Jazmyn Newman., F.A.MAEGANOSamanthaI. Judy Faulkner D.O., JFOORTIZ See D.O. Scottie Ford, MSN, APRN, NP-C  Mei Stokes. [place]do Narvaez, MSN, APRN-CNP     Primary Care Physician: Torin Tripp MD   Admitting Physician:  Areatha Lundborg, DO  Admission date and time: 2/2/2021  9:53 AM    Room:  [address] [place] [address]  Admitting diagnosis: Severe sepsis [facility]) [A41.9, R65.20]    Patient Name: Daija Manjarrez  MRN: [id number]    Date of Service: 2/4/2021     Subjective:  Tamiko Eagle is a 80 y.o. female who was seen and examined today,2/4/2021, at the bedside. Teresa remains difficult to arouse during my examination today. She has been started on tube feeds and seems to be tolerating this fairly well. She has developed increasing stool output and the bowel regimen has been cut back as per the surgery team.  Multiple subspecialist continue to provide consultation. The patient remains critically ill. Review of System: Unable to be obtained given current condition    Physical Exam:  I/O this shift:  In: 200 [NG/GT:150; IV Piggyback:50]  Out: -     Intake/Output Summary (Last 24 hours) at 2/4/2021 1134  Last data filed at 2/4/2021 0900  Gross per 24 hour   Intake 1142 ml   Output 770 ml   Net 372 ml   I/O last 3 completed shifts: In: 952 [I.V.:10; NG/GT:842; IV Piggyback:100]  Out: 770 [Urine:770]  Patient Vitals for the past 96 hrs (Last 3 readings):   Weight   02/04/21 0118 146 lb 6 oz (66.4 kg)   02/02/21 0955 120 lb (54.4 kg)     Vital Signs:   Blood pressure 136/77, pulse 121, temperature 98.6 °F (37 °C), temperature source Axillary, resp. rate 20, height 5' 4\" (1.626 m), weight 146 lb 6 oz (66.4 kg), SpO2 99 %. General appearance:  Lethargic but more easily aroused, acute on chronic ill-appearing. Head:  Normocephalic. No masses, lesions or tenderness. Eyes:  PERRLA. EOMI. Sclera clear. Buccal mucosa dry.   ENT: Ears normal. Mucosa normal.  Neck:    Supple. Trachea midline. No thyromegaly. No JVD. No bruits. Heart:    Rhythm regular. Tachycardic rate, systolic murmur  Lungs:    Symmetrical.  Diminished bibasilar, bilateral rhonchi  Abdomen:   Soft. Moderately distended and tender to palpation with a grimace elicited on palpation. Bowel sounds are hypoactive. Extremities:    Peripheral pulses present. No peripheral edema. No ulcers. No cyanosis. No clubbing. Neurologic:    Lethargic, arouses to loud verbal and noxious stimuli. Does not answer questions or follow commands. Agitated. Psych:   Lethargic, agitated. Musculoskeletal:   No joint edema, enlargement or tenderness appreciated gait not assessed. Integumentary:  No rashes  Skin normal color and texture.   Genitalia/Breast:  Deferred    Medication:  Scheduled Meds:   metoprolol tartrate  25 mg Per NG tube BID    sodium chloride flush  10 mL Intravenous 2 times per day    vancomycin  15 mg/kg Intravenous Q18H    budesonide  0.5 mg Nebulization BID    pantoprazole  40 mg Intravenous Daily    Arformoterol Tartrate  15 mcg Nebulization BID    enoxaparin  1 mg/kg Subcutaneous BID    piperacillin-tazobactam  3,375 mg Intravenous Q8H    sennosides-docusate sodium  2 tablet Oral Daily    vancomycin  125 mg Oral 4 times per day     Continuous Infusions:   0.45 % NaCl with KCl 20 mEq      sodium chloride 12.5 mL/hr at 02/04/21 1039       Objective Data:  CBC with Differential:    Lab Results   Component Value Date    WBC 19.2 02/04/2021    RBC 3.29 02/04/2021    HGB 9.1 02/04/2021    HCT 30.6 02/04/2021     02/04/2021    MCV 93.0 02/04/2021    MCH 27.7 02/04/2021    MCHC 29.7 02/04/2021    RDW 16.8 02/04/2021    NRBC 1.0 11/12/2020    SEGSPCT 58 05/06/2013    METASPCT 3.5 11/13/2020    LYMPHOPCT 11.4 02/04/2021    PROMYELOPCT 4.0 11/12/2020    MONOPCT 7.0 02/04/2021    MYELOPCT 3.5 02/04/2021    BASOPCT 0.9 02/04/2021    MONOSABS 1.34 02/04/2021 LYMPHSABS 2.11 02/04/2021    EOSABS 0.17 02/04/2021    BASOSABS 0.17 02/04/2021     BMP:    Lab Results   Component Value Date     02/04/2021    K 3.4 02/04/2021    K 3.7 02/02/2021     02/04/2021    CO2 19 02/04/2021    BUN 32 02/04/2021    LABALBU 2.1 02/04/2021    LABALBU 4.1 04/10/2012    CREATININE 0.4 02/04/2021    CALCIUM 7.8 02/04/2021    GFRAA >60 02/04/2021    LABGLOM >60 02/04/2021    GLUCOSE 169 02/04/2021    GLUCOSE 87 04/10/2012     Last 3 Troponin:    Lab Results   Component Value Date    TROPONINI 0.07 02/02/2021    TROPONINI <0.01 11/01/2020    TROPONINI 0.09 11/01/2020     U/A:    Lab Results   Component Value Date    COLORU Straw 02/02/2021    PROTEINU 30 02/02/2021    PHUR 5.5 02/02/2021    WBCUA >20 02/02/2021    WBCUA NONE 11/10/2010    RBCUA 2-5 02/02/2021    RBCUA NONE 11/10/2010    BACTERIA MANY 02/02/2021    CLARITYU CLOUDY 02/02/2021    SPECGRAV >=1.030 02/02/2021    LEUKOCYTESUR MODERATE 02/02/2021    UROBILINOGEN 0.2 02/02/2021    BILIRUBINUR Negative 02/02/2021    BILIRUBINUR NEGATIVE 11/10/2010    BLOODU MODERATE 02/02/2021    GLUCOSEU Negative 02/02/2021    GLUCOSEU NEGATIVE 11/10/2010       Wound Documentation:   Wound 11/01/20 Elbow Right (Active)   Number of days: 93       Wound 11/01/20 Heel Right;Lateral (Active)   Wound Etiology Pressure Unstageable 02/02/21 1716   Dressing Status Intact 02/02/21 2013   Wound Cleansed Not Cleansed 02/02/21 1716   Dressing/Treatment Foam 02/02/21 2013   Dressing Change Due 02/09/21 02/02/21 1716   Wound Assessment Dry;Eschar dry 02/02/21 1716   Drainage Amount None 02/02/21 2013   Odor None 02/02/21 1716   Arelis-wound Assessment Intact 02/02/21 1716   Number of days: 93       Wound 11/01/20 Heel Left;Lateral (Active)   Dressing Status Intact 02/02/21 2013   Dressing/Treatment Foam 02/02/21 2013   Number of days: 93       Wound 02/02/21 Sacrum Mid pinpoint sized (Active)   Wound Etiology Pressure Stage  2 02/02/21 1725 Dressing Status Intact 02/02/21 2013   Wound Cleansed Cleansed with saline 02/02/21 1726   Dressing/Treatment Foam 02/02/21 2013   Dressing Change Due 02/09/21 02/02/21 1726   Wound Length (cm) 0.25 cm 02/02/21 1927   Wound Width (cm) 0.25 cm 02/02/21 1927   Wound Surface Area (cm^2) 0.06 cm^2 02/02/21 1927   Drainage Amount None 02/02/21 2013   Odor None 02/02/21 1726   Arelis-wound Assessment Intact 02/02/21 1726   Number of days: 0       Assessment:  · Severe sepsis associated with acute organ dysfunction  · Urinary tract infection  · Acute respiratory failure with hypoxia in the setting of bilateral pneumonia with recent Covid infection (11/2020)  · Hypovolemic hypernatremia and hyperchloremia due to poor intake and dehydration  · Elevated troponin, likely non-STEMI due to sepsis  · Acute diverticulitis   · Abnormal appearance of the spleen on imaging with concern for splenic infarct   · Mild normocytic normochromic anemia  · Acute metabolic encephalopathy superimposed on advanced dementia    Plan:   Teresa remains critically ill at this point. NG tube is in place and she is receiving trickle tube feeds which she has been tolerating well. She has developed increasing stool output and bowel regimen has been de-escalated. Broad-spectrum antibiotic therapy is being employed. Multiple subspecialist continue to provide consultation and we appreciate their input. The patient's long-term prognosis is very poor at this point and the palliative care team continues to follow. More than 50% of my  time was spent at the bedside counseling/coordinating care with the patient and/or family with face to face contact. This time was spent reviewing notes and laboratory data as well as instructing and counseling the patient. Time I spent with the family or surrogate(s) is included only if the patient was incapable of providing the necessary information or participating in medical decisions. I also discussed the differential diagnosis and all of the proposed management plans with the patient and individuals accompanying the patient. Teresa requires this high level of physician care and nursing on the IMC/Telemetry unit due the complexity of decision management and chance of rapid decline or death. Continued cardiac monitoring and higher level of nursing are required. I am readily available for any further decision-making and intervention.      Josefa Vogel DO  2/4/2021  11:34 AM

## 2021-02-04 NOTE — PROGRESS NOTES
Incontinent of loose brown/red stool, rectum dilated  open with ball of stool keeping rectum dilated open. Dr Tonny Post notified.

## 2021-02-04 NOTE — CARE COORDINATION
SS NOTE: COVID NEGATIVE 2/2. Pt will receive potassium today, she is on IV Zocyn and vanc. She continues to have a temporary feeding tube. Palliative care is seeing this pt. She is a Resident of [facility] [phone]: [phone]). They are holding a bed for her return there. For the SNF return pt will need a PRECERT, signed YU. SS to continue. Ernie Moore. 2/4/2021.12:23PM.

## 2021-02-04 NOTE — FLOWSHEET NOTE
02/04/21 1548   Provider Notification   Reason for Communication Critical Value (comment)  (+ single bottle Blood Cx Gram + cocci in clusters)   Provider Name Dr. Lisa Casanova   Provider Notification Physician   Method of Communication Face to face   Response At bedside   Notification Time [id number]

## 2021-02-04 NOTE — PROGRESS NOTES
GENERAL SURGERY  DAILY PROGRESS NOTE  2/4/2021    CHIEF COMPLAINT:  Chief Complaint   Patient presents with    Fever     from NH, usually demented, SOB and fever per NH       SUBJECTIVE:  Multiple large bowel movements after increasing bowel regimen    OBJECTIVE:  /79   Pulse 110   Temp 97.2 °F (36.2 °C) (Axillary)   Resp 18   Ht 5' 4\" (1.626 m)   Wt 146 lb 6 oz (66.4 kg)   SpO2 99%   BMI 25.13 kg/m²     GENERAL:  NAD. A&Ox3. LUNGS:  No increased work of breathing. CARDIOVASCULAR: RR  ABDOMEN:  Soft, non-distended, minimal tenderness LUQ & LLQ. No guarding, rigidity, rebound. ASSESSMENT/PLAN:  80 y.o. female with constipation and large stool ball rectosigmoid junction, questionable splenic infarct vs related to contrast phase    No plans for any intervention for spleen  Lactic acid resolved  Continue bowel regimen -- will back down some given its success overnight  No acute surgical intervention required    Fran Vargas DO  Resident, PGY-3  2/4/2021  6:23 AM     General Surgery Progress Note  [facility]    Patient's Name/Date of Birth: Virginia Pena / 11/30/1925    Date: February 4, 2021     Surgeon: Daija Vasquez MD    Chief Complaint: Abdominal distention, fecal retention    Patient Active Problem List   Diagnosis    DVT (deep venous thrombosis) ([address]    Anemia    Rheumatoid arthritis ([address]    Cervical spondylosis    Lumbar spondylosis    Shoulder joint dysfunction    DDD (degenerative disc disease)    Spinal stenosis    Vertebral compression fracture     Bulging lumbar disc    Cervical spinal canal tumor    Dementia associated with other underlying disease with behavioral disturbance ([address]    COVID-19    Prediabetes    Severe sepsis (HCC)    Elevated troponin    Hypernatremia    Altered mental status       Subjective: No acute overnight events. Multiple bowel movements with bowel regimen. Abdomen softer today. No complaints from patient.     Objective: /79   Pulse 110   Temp 97.2 °F (36.2 °C) (Axillary)   Resp 18   Ht 5' 4\" (1.626 m)   Wt 146 lb 6 oz (66.4 kg)   SpO2 99%   BMI 25.13 kg/m²   Labs:  Recent Labs     02/02/21  1016 02/03/21  0644   WBC 20.1* 20.0*   HGB 11.0* 10.2*   HCT 35.3 32.8*     Lab Results   Component Value Date    CREATININE 0.4 (L) 02/04/2021    BUN 32 (H) 02/04/2021     (H) 02/04/2021    K 3.4 (L) 02/04/2021     (H) 02/04/2021    CO2 19 (L) 02/04/2021     No results for input(s): LIPASE, AMYLASE in the last 72 hours.   CBC with Differential:    Lab Results   Component Value Date    WBC 20.0 02/03/2021    RBC 3.69 02/03/2021    HGB 10.2 02/03/2021    HCT 32.8 02/03/2021     02/03/2021    MCV 88.9 02/03/2021    MCH 27.6 02/03/2021    MCHC 31.1 02/03/2021    RDW 16.4 02/03/2021    NRBC 1.0 11/12/2020    SEGSPCT 58 05/06/2013    METASPCT 3.5 11/13/2020    LYMPHOPCT 10.4 02/03/2021    PROMYELOPCT 4.0 11/12/2020    MONOPCT 3.5 02/03/2021    MYELOPCT 0.9 02/03/2021    BASOPCT 0.5 02/03/2021    MONOSABS 0.80 02/03/2021    LYMPHSABS 2.00 02/03/2021    EOSABS 0.00 02/03/2021    BASOSABS 0.00 02/03/2021     BMP:    Lab Results   Component Value Date     02/04/2021    K 3.4 02/04/2021    K 3.7 02/02/2021     02/04/2021    CO2 19 02/04/2021    BUN 32 02/04/2021    LABALBU 2.4 02/03/2021    LABALBU 4.1 04/10/2012    CREATININE 0.4 02/04/2021    CALCIUM 7.8 02/04/2021    GFRAA >60 02/04/2021    LABGLOM >60 02/04/2021    GLUCOSE 169 02/04/2021    GLUCOSE 87 04/10/2012       General appearance:  NAD  Head: NCAT, PERRLA, EOMI, red conjunctiva  Neck: supple, no masses  Lungs: CTAB, equal chest rise bilateral  Heart: Reg rate  Abdomen: soft, mildly distended, nontender   Skin; no lesions  Gu: no cva tenderness  Extremities: extremities normal, atraumatic, no cyanosis or edema      Assessment/Plan:  Teresa Mi Sink is a 80 y.o. female abdominal distention, fecal retention, low concern for splenic infarction Continue bowel regimen  We will follow    Aga Daniels MD  2/4/2021  7:42 AM

## 2021-02-05 ENCOUNTER — APPOINTMENT (OUTPATIENT)
Dept: GENERAL RADIOLOGY | Age: 86
DRG: 698 | End: 2021-02-05
Payer: MEDICARE

## 2021-02-05 LAB
ABO/RH: NORMAL
ALBUMIN SERPL-MCNC: 2.2 G/DL (ref 3.5–5.2)
ALP BLD-CCNC: 179 U/L (ref 35–104)
ALT SERPL-CCNC: 9 U/L (ref 0–32)
ANION GAP SERPL CALCULATED.3IONS-SCNC: 8 MMOL/L (ref 7–16)
ANTIBODY SCREEN: NORMAL
AST SERPL-CCNC: 23 U/L (ref 0–31)
BASOPHILS ABSOLUTE: 0.14 E9/L (ref 0–0.2)
BASOPHILS RELATIVE PERCENT: 0.9 % (ref 0–2)
BILIRUB SERPL-MCNC: 0.3 MG/DL (ref 0–1.2)
BILIRUBIN DIRECT: <0.2 MG/DL (ref 0–0.3)
BILIRUBIN, INDIRECT: ABNORMAL MG/DL (ref 0–1)
BLOOD CULTURE, ROUTINE: ABNORMAL
BUN BLDV-MCNC: 16 MG/DL (ref 8–23)
CALCIUM SERPL-MCNC: 7.7 MG/DL (ref 8.6–10.2)
CHLORIDE BLD-SCNC: 116 MMOL/L (ref 98–107)
CO2: 20 MMOL/L (ref 22–29)
CREAT SERPL-MCNC: 0.4 MG/DL (ref 0.5–1)
EOSINOPHILS ABSOLUTE: 0.97 E9/L (ref 0.05–0.5)
EOSINOPHILS RELATIVE PERCENT: 6.1 % (ref 0–6)
GFR AFRICAN AMERICAN: >60
GFR NON-AFRICAN AMERICAN: >60 ML/MIN/1.73
GLUCOSE BLD-MCNC: 94 MG/DL (ref 74–99)
HCT VFR BLD CALC: 26.6 % (ref 34–48)
HCT VFR BLD CALC: 28.9 % (ref 34–48)
HCT VFR BLD CALC: 29.4 % (ref 34–48)
HEMOGLOBIN: 8.2 G/DL (ref 11.5–15.5)
HEMOGLOBIN: 8.5 G/DL (ref 11.5–15.5)
HEMOGLOBIN: 8.6 G/DL (ref 11.5–15.5)
HYPOCHROMIA: ABNORMAL
LYMPHOCYTES ABSOLUTE: 3.98 E9/L (ref 1.5–4)
LYMPHOCYTES RELATIVE PERCENT: 25.4 % (ref 20–42)
MAGNESIUM: 2.7 MG/DL (ref 1.6–2.6)
MCH RBC QN AUTO: 27.3 PG (ref 26–35)
MCHC RBC AUTO-ENTMCNC: 29.3 % (ref 32–34.5)
MCV RBC AUTO: 93.3 FL (ref 80–99.9)
METAMYELOCYTES RELATIVE PERCENT: 0.9 % (ref 0–1)
MONOCYTES ABSOLUTE: 0.64 E9/L (ref 0.1–0.95)
MONOCYTES RELATIVE PERCENT: 3.5 % (ref 2–12)
NEUTROPHILS ABSOLUTE: 10.18 E9/L (ref 1.8–7.3)
NEUTROPHILS RELATIVE PERCENT: 63.2 % (ref 43–80)
ORGANISM: ABNORMAL
OVALOCYTES: ABNORMAL
PDW BLD-RTO: 17 FL (ref 11.5–15)
PHOSPHORUS: 2.7 MG/DL (ref 2.5–4.5)
PLATELET # BLD: 398 E9/L (ref 130–450)
PMV BLD AUTO: 10.9 FL (ref 7–12)
POIKILOCYTES: ABNORMAL
POLYCHROMASIA: ABNORMAL
POTASSIUM SERPL-SCNC: 4.5 MMOL/L (ref 3.5–5)
RBC # BLD: 3.15 E12/L (ref 3.5–5.5)
SODIUM BLD-SCNC: 144 MMOL/L (ref 132–146)
TOTAL PROTEIN: 5.7 G/DL (ref 6.4–8.3)
VANCOMYCIN TROUGH: 10.8 MCG/ML (ref 5–16)
WBC # BLD: 15.9 E9/L (ref 4.5–11.5)

## 2021-02-05 PROCEDURE — 2700000000 HC OXYGEN THERAPY PER DAY

## 2021-02-05 PROCEDURE — 6370000000 HC RX 637 (ALT 250 FOR IP): Performed by: SPECIALIST

## 2021-02-05 PROCEDURE — 85018 HEMOGLOBIN: CPT

## 2021-02-05 PROCEDURE — 80076 HEPATIC FUNCTION PANEL: CPT

## 2021-02-05 PROCEDURE — 86901 BLOOD TYPING SEROLOGIC RH(D): CPT

## 2021-02-05 PROCEDURE — 80048 BASIC METABOLIC PNL TOTAL CA: CPT

## 2021-02-05 PROCEDURE — 2580000003 HC RX 258: Performed by: SPECIALIST

## 2021-02-05 PROCEDURE — 6360000002 HC RX W HCPCS: Performed by: NURSE PRACTITIONER

## 2021-02-05 PROCEDURE — 6370000000 HC RX 637 (ALT 250 FOR IP): Performed by: NURSE PRACTITIONER

## 2021-02-05 PROCEDURE — 86900 BLOOD TYPING SEROLOGIC ABO: CPT

## 2021-02-05 PROCEDURE — 74018 RADEX ABDOMEN 1 VIEW: CPT

## 2021-02-05 PROCEDURE — 84100 ASSAY OF PHOSPHORUS: CPT

## 2021-02-05 PROCEDURE — 36415 COLL VENOUS BLD VENIPUNCTURE: CPT

## 2021-02-05 PROCEDURE — 2580000003 HC RX 258: Performed by: NURSE PRACTITIONER

## 2021-02-05 PROCEDURE — 94640 AIRWAY INHALATION TREATMENT: CPT

## 2021-02-05 PROCEDURE — 6360000002 HC RX W HCPCS: Performed by: INTERNAL MEDICINE

## 2021-02-05 PROCEDURE — 2060000000 HC ICU INTERMEDIATE R&B

## 2021-02-05 PROCEDURE — 83735 ASSAY OF MAGNESIUM: CPT

## 2021-02-05 PROCEDURE — C9113 INJ PANTOPRAZOLE SODIUM, VIA: HCPCS | Performed by: NURSE PRACTITIONER

## 2021-02-05 PROCEDURE — 86850 RBC ANTIBODY SCREEN: CPT

## 2021-02-05 PROCEDURE — 6360000002 HC RX W HCPCS: Performed by: SPECIALIST

## 2021-02-05 PROCEDURE — 85014 HEMATOCRIT: CPT

## 2021-02-05 PROCEDURE — 85025 COMPLETE CBC W/AUTO DIFF WBC: CPT

## 2021-02-05 PROCEDURE — 6370000000 HC RX 637 (ALT 250 FOR IP): Performed by: STUDENT IN AN ORGANIZED HEALTH CARE EDUCATION/TRAINING PROGRAM

## 2021-02-05 RX ORDER — SODIUM CHLORIDE 9 MG/ML
INJECTION, SOLUTION INTRAVENOUS PRN
Status: DISCONTINUED | OUTPATIENT
Start: 2021-02-05 | End: 2021-02-10 | Stop reason: HOSPADM

## 2021-02-05 RX ADMIN — POTASSIUM CHLORIDE AND SODIUM CHLORIDE: 450; 150 INJECTION, SOLUTION INTRAVENOUS at 06:13

## 2021-02-05 RX ADMIN — METOPROLOL TARTRATE 25 MG: 25 TABLET, FILM COATED ORAL at 08:42

## 2021-02-05 RX ADMIN — Medication 10 ML: at 08:42

## 2021-02-05 RX ADMIN — VANCOMYCIN HYDROCHLORIDE 750 MG: 1 INJECTION, POWDER, LYOPHILIZED, FOR SOLUTION INTRAVENOUS at 18:19

## 2021-02-05 RX ADMIN — BUDESONIDE 500 MCG: 0.5 INHALANT RESPIRATORY (INHALATION) at 17:56

## 2021-02-05 RX ADMIN — ARFORMOTEROL TARTRATE 15 MCG: 15 SOLUTION RESPIRATORY (INHALATION) at 07:13

## 2021-02-05 RX ADMIN — Medication 125 MG: at 11:50

## 2021-02-05 RX ADMIN — DOCUSATE SODIUM 50MG AND SENNOSIDES 8.6MG 2 TABLET: 8.6; 5 TABLET, FILM COATED ORAL at 08:42

## 2021-02-05 RX ADMIN — ARFORMOTEROL TARTRATE 15 MCG: 15 SOLUTION RESPIRATORY (INHALATION) at 17:56

## 2021-02-05 RX ADMIN — BUDESONIDE 500 MCG: 0.5 INHALANT RESPIRATORY (INHALATION) at 07:13

## 2021-02-05 RX ADMIN — Medication 125 MG: at 00:13

## 2021-02-05 RX ADMIN — PIPERACILLIN AND TAZOBACTAM 3375 MG: 3; .375 INJECTION, POWDER, FOR SOLUTION INTRAVENOUS at 21:20

## 2021-02-05 RX ADMIN — ENOXAPARIN SODIUM 50 MG: 60 INJECTION SUBCUTANEOUS at 08:42

## 2021-02-05 RX ADMIN — PIPERACILLIN AND TAZOBACTAM 3375 MG: 3; .375 INJECTION, POWDER, FOR SOLUTION INTRAVENOUS at 13:50

## 2021-02-05 RX ADMIN — SODIUM CHLORIDE: 9 INJECTION, SOLUTION INTRAVENOUS at 09:51

## 2021-02-05 RX ADMIN — ENOXAPARIN SODIUM 50 MG: 60 INJECTION SUBCUTANEOUS at 21:20

## 2021-02-05 RX ADMIN — PIPERACILLIN AND TAZOBACTAM 3375 MG: 3; .375 INJECTION, POWDER, FOR SOLUTION INTRAVENOUS at 05:32

## 2021-02-05 RX ADMIN — PANTOPRAZOLE SODIUM 40 MG: 40 INJECTION, POWDER, FOR SOLUTION INTRAVENOUS at 08:42

## 2021-02-05 RX ADMIN — Medication 125 MG: at 05:36

## 2021-02-05 RX ADMIN — Medication 10 ML: at 21:20

## 2021-02-05 NOTE — PROGRESS NOTES
Patient had medium liquid bowel movement that was [place] red blood with bright red clots with brown stool following.

## 2021-02-05 NOTE — PROGRESS NOTES
Internal Medicine Progress Note    [facility]    Mili Karen. Jocelynn Hanson., F.CARLY.MAEGANOSamanthaI. Nesha Lopez D.O., JFOGANESH Renner, MSN, APRN, NP-C  Evelyn Benson. Jess Levi, MSN, APRN-CNP     Primary Care Physician: Nevaeh Steel MD   Admitting Physician:  Sandrine Washington DO  Admission date and time: 2/2/2021  9:53 AM    Room:  [address] [place] [address]  Admitting diagnosis: Severe sepsis [facility]) [A41.9, R65.20]    Patient Name: Jesus Orellana  MRN: [id number]    Date of Service: 2/5/2021     Subjective:  Panda Dial is a 80 y.o. female who was seen and examined today,2/5/2021, at the bedside. Teresa was sleeping upon entering the room. Slightly difficult to arouse but once awake she did stay awake and she did engage in conversation however speech is incoherent most of the examination. No family present. Review of System: Unable to be obtained secondary to underlying dementia. Physical Exam:  No intake/output data recorded. Intake/Output Summary (Last 24 hours) at 2/5/2021 1110  Last data filed at 2/5/2021 0543  Gross per 24 hour   Intake 2127 ml   Output 1700 ml   Net 427 ml   I/O last 3 completed shifts: In: 8855 [I.V.:240; NG/GT:1337; IV Piggyback:750]  Out: 6931 [YGRDW:9931]  Patient Vitals for the past 96 hrs (Last 3 readings):   Weight   02/05/21 0545 141 lb 4.8 oz (64.1 kg)   02/04/21 0118 146 lb 6 oz (66.4 kg)   02/02/21 0955 120 lb (54.4 kg)     Vital Signs:   Blood pressure (!) 169/89, pulse 95, temperature 97.5 °F (36.4 °C), temperature source Axillary, resp. rate 20, height 5' 4\" (1.626 m), weight 141 lb 4.8 oz (64.1 kg), SpO2 96 %. General appearance:  Lethargic but more easily aroused, ill-appearing. In no acute distress. Head:  Normocephalic. No masses, lesions or tenderness. Eyes:  PERRLA. EOMI. Sclera clear. ENT:  Ears normal. Buccal mucosa dry. NG tube via right nostril. Missing teeth.   Neck: Supple. Trachea midline. No thyromegaly. No JVD. No bruits. Heart:    Rhythm regular. Regular rate, systolic murmur, pacemaker left chest.  No overt signs of skin erosion. Lungs:    Symmetrical.  Diminished bibasilar, bilateral rhonchi  Abdomen:   Soft. Moderately distended and tender to palpation with a grimace elicited on palpation. Bowel sounds are hypoactive. Extremities:    Peripheral pulses present. No peripheral edema. Neurologic:    Lethargic, arouses to loud verbal and noxious stimuli. Does not answer questions or follow commands. Agitated. Psych:   Lethargic, agitated. Musculoskeletal:   No joint edema, enlargement or tenderness appreciated gait not assessed. Integumentary:  No rashes  Skin normal color and texture. Patient has scattered ecchymotic areas on the upper extremities in various stages of healing. Genitalia/Breast:  Casey catheter. Patient was found to have a loose brown bowel movement at the time of examination. See wound documentation below.     Medication:  Scheduled Meds:   vancomycin  750 mg Intravenous Q18H    metoprolol tartrate  25 mg Per NG tube BID    sodium chloride flush  10 mL Intravenous 2 times per day    budesonide  0.5 mg Nebulization BID    pantoprazole  40 mg Intravenous Daily    Arformoterol Tartrate  15 mcg Nebulization BID    enoxaparin  1 mg/kg Subcutaneous BID    piperacillin-tazobactam  3,375 mg Intravenous Q8H    sennosides-docusate sodium  2 tablet Oral Daily    vancomycin  125 mg Oral 4 times per day     Continuous Infusions:   0.45 % NaCl with KCl 20 mEq 60 mL/hr at 02/05/21 0613    sodium chloride 12.5 mL/hr at 02/05/21 0951       Objective Data:  CBC with Differential:    Lab Results   Component Value Date    WBC 15.9 02/05/2021    RBC 3.15 02/05/2021    HGB 8.6 02/05/2021    HCT 29.4 02/05/2021     02/05/2021    MCV 93.3 02/05/2021    MCH 27.3 02/05/2021    MCHC 29.3 02/05/2021    RDW 17.0 02/05/2021    NRBC 1.0 11/12/2020 SEGSPCT 58 05/06/2013    METASPCT 0.9 02/05/2021    LYMPHOPCT 25.4 02/05/2021    PROMYELOPCT 4.0 11/12/2020    MONOPCT 3.5 02/05/2021    MYELOPCT 3.5 02/04/2021    BASOPCT 0.9 02/05/2021    MONOSABS 0.64 02/05/2021    LYMPHSABS 3.98 02/05/2021    EOSABS 0.97 02/05/2021    BASOSABS 0.14 02/05/2021     BMP:    Lab Results   Component Value Date     02/05/2021    K 4.5 02/05/2021    K 3.7 02/02/2021     02/05/2021    CO2 20 02/05/2021    BUN 16 02/05/2021    LABALBU 2.2 02/05/2021    LABALBU 4.1 04/10/2012    CREATININE 0.4 02/05/2021    CALCIUM 7.7 02/05/2021    GFRAA >60 02/05/2021    LABGLOM >60 02/05/2021    GLUCOSE 94 02/05/2021    GLUCOSE 87 04/10/2012     Last 3 Troponin:    Lab Results   Component Value Date    TROPONINI 0.07 02/02/2021    TROPONINI <0.01 11/01/2020    TROPONINI 0.09 11/01/2020     U/A:    Lab Results   Component Value Date    COLORU Straw 02/02/2021    PROTEINU 30 02/02/2021    PHUR 5.5 02/02/2021    WBCUA >20 02/02/2021    WBCUA NONE 11/10/2010    RBCUA 2-5 02/02/2021    RBCUA NONE 11/10/2010    BACTERIA MANY 02/02/2021    CLARITYU CLOUDY 02/02/2021    SPECGRAV >=1.030 02/02/2021    LEUKOCYTESUR MODERATE 02/02/2021    UROBILINOGEN 0.2 02/02/2021    BILIRUBINUR Negative 02/02/2021    BILIRUBINUR NEGATIVE 11/10/2010    BLOODU MODERATE 02/02/2021    GLUCOSEU Negative 02/02/2021    GLUCOSEU NEGATIVE 11/10/2010       Wound Documentation:   Wound 11/01/20 Elbow Right (Active)   Number of days: 93       Wound 11/01/20 Heel Right;Lateral (Active)   Wound Etiology Pressure Unstageable 02/02/21 1716   Dressing Status Intact 02/02/21 2013   Wound Cleansed Not Cleansed 02/02/21 1716   Dressing/Treatment Foam 02/02/21 2013   Dressing Change Due 02/09/21 02/02/21 1716   Wound Assessment Dry;Eschar dry 02/02/21 1716   Drainage Amount None 02/02/21 2013   Odor None 02/02/21 1716   Arelis-wound Assessment Intact 02/02/21 1716   Number of days: 93       Wound 11/01/20 Heel Left;Lateral (Active) Dressing Status Intact 02/02/21 2013   Dressing/Treatment Foam 02/02/21 2013   Number of days: 93       Wound 02/02/21 Sacrum Mid pinpoint sized (Active)   Wound Etiology Pressure Stage  2 02/02/21 1726   Dressing Status Intact 02/02/21 2013   Wound Cleansed Cleansed with saline 02/02/21 1726   Dressing/Treatment Foam 02/02/21 2013   Dressing Change Due 02/09/21 02/02/21 1726   Wound Length (cm) 0.25 cm 02/02/21 1927   Wound Width (cm) 0.25 cm 02/02/21 1927   Wound Surface Area (cm^2) 0.06 cm^2 02/02/21 1927   Drainage Amount None 02/02/21 2013   Odor None 02/02/21 1726   Arelis-wound Assessment Intact 02/02/21 1726   Number of days: 0       Assessment:  · Severe sepsis associated with acute organ dysfunction  · Enterococcus faecalis urinary tract infection secondary to chronic indwelling urinary catheter  · Acute respiratory failure with hypoxia in the setting of bilateral pneumonia with recent Covid infection (11/2020)  · Hypovolemic hypernatremia and hyperchloremia due to poor intake and dehydration  · Elevated troponin, likely non-STEMI due to sepsis  · Acute diverticulitis   · Abnormal appearance of the spleen on imaging with concern for splenic infarct   · Mild normocytic normochromic anemia  · Acute metabolic encephalopathy superimposed on advanced dementia  · History of Covid antibodies    Plan:   NG tube is in place and tube feedings will be continued. Monitor stool output. Patient did have a loose/pudding consistency bowel movement at the time of examination. Discontinue Senokot  Continue antibiotic therapy  Multiple subspecialist continue to provide consultation and recommendations of been reviewed. The patient's long-term prognosis is very poor at this point and the palliative care team continues to follow. More than 50% of my  time was spent at the bedside counseling/coordinating care with the patient and/or family with face to face contact. This time was spent reviewing notes and laboratory data as well as instructing and counseling the patient. Time I spent with the family or surrogate(s) is included only if the patient was incapable of providing the necessary information or participating in medical decisions. I also discussed the differential diagnosis and all of the proposed management plans with the patient and individuals accompanying the patientSamantha Moore requires this high level of physician care and nursing on the IMC/Telemetry unit due the complexity of decision management and chance of rapid decline or death. Continued cardiac monitoring and higher level of nursing are required. I am readily available for any further decision-making and intervention. The patient was seen, examined and then discussed with Dr. Andres Jones. PAUL Mckeon CNP  2/5/2021  11:09 AM       I saw and evaluated the patient. I agree with the findings and the plan of care as documented in Aria LOUIS's  note.     Mike Stone D.O., [facility]  2:33 PM  2/5/2021

## 2021-02-05 NOTE — PROGRESS NOTES
Seen and examined. Appears to be hemorrhoidal bleeding which is mild mixed with stool. Patient appears to be in no distress. No visible vessel bleeding. we will confirm a type and screen and cross for 2 units. Given patient is DNR CCA status unless life-threatening hemorrhage would not pursue any surgical intervention. Every 6 H&H.     Patrick Martins MD, MS  Minimally Invasive and Bariatric Surgery  [phone] (p)  2/5/2021  3:14 PM

## 2021-02-05 NOTE — PROGRESS NOTES
Patient having rectal bleeding. Notified Dr. Mu Byers via perfect serve. Await response. Dr. Mu Nieves made aware.

## 2021-02-05 NOTE — PROGRESS NOTES
Palliative Care Department  Palliative Care Progress Note  Provider: Juan Manuel MITCHELL  [address] [place] Day: 4  Date of Initial Consult: 2/2/21  Referring Provider: Luis Alberto MITCHELL  Palliative Medicine was consulted for assistance with: Code status Discussion, Assist with goals of care and Family Support    Chief Complaint: Kris Mejias is a 80 y.o. female with chief complaint of fever and AMS    HPI:   Kris Mejias is a 80 y.o. female with significant past medical history of dementia, prior hospitalization with COVID during which she had a failure to thrive picture, but improved and returned to the SNF. She was admitted on 2/2/2021 with concern for fever and AMS and she was found to have UTI, hypernatremia, as well as concern for pneumonia and diverticulitis. ASSESSMENT/PLAN:     Pertinent Hospital Diagnoses:  Current medical issues leading to Palliative Medicine involvement include   Active Hospital Problems    Diagnosis Date Noted    Anemia [D64.9] 05/03/2013     Priority: Medium    Elevated troponin [R77.8]     Hypernatremia [E87.0]     Altered mental status [R41.82]     Fecal impaction ([facility] [address] [K56.41]     Severe sepsis ([facility] [address] [A41.9, R65.20] 02/02/2021     Palliative Care Encounter / Counseling Regarding Goals of Care:  Please see detailed goals of care discussion as below. ? At this time, Kris Mejias, Does Not have capacity for medical decision-making. Capacity is time limited and situation/question specific. ? During encounter the patient's daughter Merna Pruett was surrogate medical decision-maker  ? Outcome of goals of care meeting: continue current management  ? Code status: DNR-CCA:  DNR form is in epic media and soft chart  ? Advanced Directives: HC-POA  ? Surrogate/Legal NOK:   David Rojo () daughter/JOLENEBRISEYDA Rosenthal () son. ? Will follow and continued to discuss goals of care pending clinical progression.     Referrals: none today SUBJECTIVE:   Events/Discussions:  2/3/21:  Spoke with daughter Susan England, who states she is quite happy that patient is now having BMs. She feels that patient has more time before she would have to make a decision regarding PEG tube and isn't even thinking that she would want to pursue that right norow. Her goal is f patient to improve symptomatically and go back to nursing facility. 2/4/21:  Patient seen at bedside, no family present. Her overall condition appears to be unchanged. She continues to be somnolent and confused. Her abdomen is softer but continues to be tender to palpation. As per previous conversation with family her CODE STATUS is DNR CCA. Goal at this time is continue with all of her other care unchanged in the hopes that her mental status will improve and she will begin to eat adequately again by mouth. Team continue to follow and provide ongoing support regarding goals of care pending her further clinical progression. 2/5/2021  Chart reviewed and patient seen. Patient lethargic, responds briefly to voice. NGT remains in place. Call placed to Malathi cosme-no answer and message left. Will await call back to further discuss goals of care.      OBJECTIVE:   Prognosis: Guarded    Physical Exam:  BP (!) 169/89   Pulse 95   Temp 97.5 °F (36.4 °C) (Axillary)   Resp 20   Ht 5' 4\" (1.626 m)   Wt 141 lb 4.8 oz (64.1 kg)   SpO2 96%   BMI 24.25 kg/m²     Gen: Elderly, frail, in no acute distress  HEENT:  Normocephalic, conjunctiva pink, no drainage  Neck:  Supple  Lungs:  NC, easy and unlabored respirations  Heart: RRR  Abd:  Nontender, + bowel sounds  M/S/Ext: Weak, + edema, pulses present  Skin:  Warm and dry  Neuro:  Responsive to voice, lethargic    Objective data reviewed: labs, images, records, medication use, vitals and chart    Time/Communication: Greater than 50% of time spent, total 10 minutes in counseling and coordination of care at the bedside regarding goals of care and see above. Daniel MITCHELL  Palliative Medicine    Patient and the plan of care discussed with the other IDT members of Palliative Care Team, and with patient and family, as appropriate and available. Thank you for allowing Palliative Medicine to participate in the care of [facility].

## 2021-02-05 NOTE — PROGRESS NOTES
Speech Language Pathology      NAME:  Amanda De Guzman  :  1925  DATE: 2021  ROOM:  [facility]-    Attempted ongoing Speech-Language Pathology intervention for PO assessment. Pt unavailable at this time due to:  [x] HOLD per RN/ medical staff d/t medical status   [] Off unit for testing/ procedure    [] With medical staff   [] Declined intervention  [] Sleeping/ Lethargic   [] Other:     SLP to continue previously established POC and re-attempt as able.     Severe sepsis ([facility] [address] [A41.9, R65.20]        Connie Estimable MSCCC/SLP  Speech Language Pathologist  [phone]

## 2021-02-05 NOTE — PROGRESS NOTES
Pharmacy Consultation Note  (Antibiotic Dosing and Monitoring)    Initial consult date:   Consulting provider: PAUL Ruiz CNP  Drug(s): Vancomycin IV  Indication: Sepsis    Ht Readings from Last 1 Encounters:   21 5' 4\" (1.626 m)     Wt Readings from Last 1 Encounters:   21 141 lb 4.8 oz (64.1 kg)       Age/  Gender Actual BW IBW  Allergy Information   80 y.o.     female 54.4 kg 54.7 kg  Food                 Date  WBC BUN/CR UOP Drug/Dose Time   Given Level(s)   (Time) Comments     (#1) 20.1 36/0.5 -- Vancomycin 750 mg IV Q18H 1633     2/3  (#2) 20.0 30/0.4 0.5 mL/kg/hr Vancomycin 750 mg IV Q18H 1216       (#3) 19.2 32/0.4 0.48 mL/kg/hr Vancomycin 750 mg IV Q18H 0343   Trough @ 2347 = 10.8 mcg/mL Trough 2.5 hours late; estimated true trough ~ 12.3 mcg/mL     (#4) 15.9 16/0.4 1.11 mL/kg/hr Vancomycin 750 mg IV Q18H 0019  <1800>       (#5)            (#6)            (#7)            Estimated Creatinine Clearance: 73 mL/min (A) (based on SCr of 0.4 mg/dL (L)). UOP over the past 24 hours:       Intake/Output Summary (Last 24 hours) at 2021 1104  Last data filed at 2021 0543  Gross per 24 hour   Intake 2127 ml   Output 1700 ml   Net 427 ml       Temp max: Temp (24hrs), Av.2 °F (36.8 °C), Min:97.5 °F (36.4 °C), Max:99.2 °F (37.3 °C)      Antibiotic Regimen:  Antibiotic Dose Date Initiated   Zosyn   3.375 g IV Q8H      Cultures:  available culture and sensitivity results were reviewed in EPIC  Cultures sent and are pending.   Culture Date Result    Blood cx #1 2/2 Gram positive cocci in clusters   Blood cx #2 2/2 NGTD   Urine cx 2/2 Enterococcus faecalis   Respiratory Panel 2/2 Negative   Covid-19 2/2 Not detected   Blood cx #3 2/2 NGTD   Blood cx #4 2/4 In process   Blood cx #5 2/4 In process     Assessment:  · Consulted by PAUL Ruiz CNP to dose/monitor vancomycin  · Goal trough level:  15-20 mcg/mL  · Pt is a 81 y/o F who presented with AMS · Serum creatinine today: 0.4; CrCl ~ 73 mL/min; baseline Scr ~ 0.4  · 2/5: Evening dose hung 46 minutes early and prior to trough. Called and had infusion stopped within 2-3 minutes. Trough drawn @ 2347 = 10.8 mcg/mL (2.5 hours late).  Estimated true trough ~ 12.3 mcg/mL    Plan:  · Vancomycin 750 mg IV Q18H  · Repeat trough as needed  · Follow renal function  · Pharmacist will follow and monitor/adjust dosing as necessary      Thank you for the consult,    Keiko Smart, PharmD 2/5/2021 11:04 AM   [phone]

## 2021-02-05 NOTE — CARE COORDINATION
SS NOTE: COVID NEGATIVE 2/2. She continues to have a temporary feeding tube. She is a Resident of [facility] [id number]: [phone]). They are holding a bed for her return there. For the SNF return pt will need a PRECERT, signed YU. SS to continue. Boyd Moore. 2/5/2021.1:04PM.

## 2021-02-05 NOTE — PROGRESS NOTES
1745: Called CT due to STAT CXR order and they stated they will be up shortly to obtain. 1800: Called lab to see if H&H from 1515 was drawn and they stated no order is seen from their end. H&H reordered. Awaiting lab to draw patient.

## 2021-02-05 NOTE — PROGRESS NOTES
[facility]  [facility]  Progress Note    NAME: Aj Sampson  MR:  [id number]  :   1925  DATE OF SERVICE:21    This is a face to face encounter with Teresa Diaz 80 y.o. female on 21  ID following for   Chief Complaint   Patient presents with    Fever     from NH, usually demented, SOB and fever per NH     SUBJECTIVE:  PT IN BED LETHARGIC   Afebrile ngt  4L  More awake   Patient is tolerating medications. No reported adverse drug reactions. Review of systems:  As stated above in the chief complaint, otherwise negative. Medications:  Scheduled Meds:   vancomycin  750 mg Intravenous Q18H    metoprolol tartrate  25 mg Per NG tube BID    sodium chloride flush  10 mL Intravenous 2 times per day    budesonide  0.5 mg Nebulization BID    pantoprazole  40 mg Intravenous Daily    Arformoterol Tartrate  15 mcg Nebulization BID    enoxaparin  1 mg/kg Subcutaneous BID    piperacillin-tazobactam  3,375 mg Intravenous Q8H    vancomycin  125 mg Oral 4 times per day     Continuous Infusions:   0.45 % NaCl with KCl 20 mEq 60 mL/hr at 21 5580    sodium chloride Stopped (21 1152)     PRN Meds:sodium chloride flush, acetaminophen **OR** acetaminophen, albuterol, magnesium sulfate, sodium phosphate IVPB **OR** sodium phosphate IVPB, potassium chloride    OBJECTIVE:  BP (!) 169/89   Pulse 95   Temp 97.5 °F (36.4 °C) (Axillary)   Resp 20   Ht 5' 4\" (1.626 m)   Wt 141 lb 4.8 oz (64.1 kg)   SpO2 96%   BMI 24.25 kg/m²   Temp  Av.2 °F (36.8 °C)  Min: 97.5 °F (36.4 °C)  Max: 99.2 °F (37.3 °C)  Constitutional:  The patient is AROUSABLE  Skin:    Warm and dry. HEENT:      AT/NC  Chest:   No use of accessory muscles to breathe. Symmetrical expansion. Dec bs ant   Cardiovascular:  S1 and S2 are rhythmic and regular. No murmurs appreciated. Abdomen:   Positive bowel sounds to auscultation.   SOFT distended NGT distended Extremities:     Edema. RIGHT HEEL ESCHAR  CNS    lethargic  Lines: pIV    Radiology:  Laboratory and Tests Review:  Lab Results   Component Value Date    WBC 15.9 (H) 02/05/2021    WBC 19.2 (H) 02/04/2021    WBC 20.0 (H) 02/03/2021    HGB 8.6 (L) 02/05/2021    HCT 29.4 (L) 02/05/2021    MCV 93.3 02/05/2021     02/05/2021     No results found for: [facility]  Lab Results   Component Value Date    ALT 9 02/05/2021    AST 23 02/05/2021    ALKPHOS 179 (H) 02/05/2021    BILITOT 0.3 02/05/2021     Lab Results   Component Value Date     02/05/2021    K 4.5 02/05/2021    K 3.7 02/02/2021     02/05/2021    CO2 20 02/05/2021    BUN 16 02/05/2021    CREATININE 0.4 02/05/2021    CREATININE 0.4 02/04/2021    CREATININE 0.4 02/04/2021    GFRAA >60 02/05/2021    LABGLOM >60 02/05/2021    GLUCOSE 94 02/05/2021    GLUCOSE 87 04/10/2012    PROT 5.7 02/05/2021    LABALBU 2.2 02/05/2021    LABALBU 4.1 04/10/2012    CALCIUM 7.7 02/05/2021    BILITOT 0.3 02/05/2021    ALKPHOS 179 02/05/2021    AST 23 02/05/2021    ALT 9 02/05/2021     Lab Results   Component Value Date    CRP 29.2 (H) 11/01/2020    CRP 6.3 (H) 04/10/2012     Lab Results   Component Value Date    SEDRATE 101 (H) 11/01/2020    SEDRATE 35 (H) 07/25/2013    SEDRATE 40 (H) 04/18/2013       Microbiology:   Recent Labs     02/02/21  1849   COVID19 Reactive     Lab Results   Component Value Date    BLOODCULT2 24 Hours no growth 02/02/2021    BLOODCULT2 5 Days no growth 11/01/2020    ORG Enterococcus faecalis 02/02/2021    ORG Staphylococcus coagulase-negative 02/02/2021    ORG Lactobacillus 10/22/2015        ASSESSMENT/PLAN:  SEPSIS  LEUKOCYTOSIS  HCAP VS ASPIRATION   DIVERTICULITIS/proctitis  UTI GPC E faecalis  Cons bacteremia contaminant  H/O COVID AB +  splenic infarct.         vancomycin (VANCOCIN) 750 mg in dextrose 5 % 250 mL IVPB, Q18H can stop     piperacillin-tazobactam (ZOSYN) 3,375 mg in dextrose 5 % 50 mL IVPB extended infusion (mini-bag), Q8   vancomycin (VANCOCIN) oral solution 125 mg, 4 times per day    CHECK FINAL CX  NUTRITION       · Monitor labs    Imaging and labs were reviewed per medical records. .  Thank you for involving me in the care of Teresa Diaz I will continue to follow. Please do not hesitate to call for any questions or concerns.     Electronically signed by Kate Campbell MD on 2/5/2021 at 2:14 PM     addenum  BLOOD CX +GPC   WILL REPEAT CONT VANCO  IV FOR NOW  Kate Campbell  2:14 PM

## 2021-02-05 NOTE — PROGRESS NOTES
Physical Therapy    [id number]    Patient unavailable for physical therapy treatment due to hold per nursing due to blood from her rectum. Hong Caldwell.  Marty  [facility]  LIC # [id number]

## 2021-02-06 ENCOUNTER — APPOINTMENT (OUTPATIENT)
Dept: CT IMAGING | Age: 86
DRG: 698 | End: 2021-02-06
Payer: MEDICARE

## 2021-02-06 LAB
ALBUMIN SERPL-MCNC: 1.9 G/DL (ref 3.5–5.2)
ALP BLD-CCNC: 199 U/L (ref 35–104)
ALT SERPL-CCNC: 14 U/L (ref 0–32)
ANION GAP SERPL CALCULATED.3IONS-SCNC: 10 MMOL/L (ref 7–16)
AST SERPL-CCNC: 41 U/L (ref 0–31)
B.E.: -4.1 MMOL/L (ref -3–3)
BASOPHILS ABSOLUTE: 0 E9/L (ref 0–0.2)
BASOPHILS RELATIVE PERCENT: 0 % (ref 0–2)
BILIRUB SERPL-MCNC: 0.3 MG/DL (ref 0–1.2)
BILIRUBIN DIRECT: <0.2 MG/DL (ref 0–0.3)
BILIRUBIN, INDIRECT: NORMAL MG/DL (ref 0–1)
BUN BLDV-MCNC: 11 MG/DL (ref 8–23)
CALCIUM SERPL-MCNC: 7.7 MG/DL (ref 8.6–10.2)
CHLORIDE BLD-SCNC: 112 MMOL/L (ref 98–107)
CO2: 19 MMOL/L (ref 22–29)
COHB: 0.3 % (ref 0–1.5)
CREAT SERPL-MCNC: 0.4 MG/DL (ref 0.5–1)
CRITICAL: ABNORMAL
DATE ANALYZED: ABNORMAL
DATE OF COLLECTION: ABNORMAL
EOSINOPHILS ABSOLUTE: 0.16 E9/L (ref 0.05–0.5)
EOSINOPHILS RELATIVE PERCENT: 1 % (ref 0–6)
GFR AFRICAN AMERICAN: >60
GFR NON-AFRICAN AMERICAN: >60 ML/MIN/1.73
GLUCOSE BLD-MCNC: 106 MG/DL (ref 74–99)
HCO3: 19.5 MMOL/L (ref 22–26)
HCT VFR BLD CALC: 25.3 % (ref 34–48)
HCT VFR BLD CALC: 26.8 % (ref 34–48)
HEMOGLOBIN: 7.6 G/DL (ref 11.5–15.5)
HEMOGLOBIN: 8.2 G/DL (ref 11.5–15.5)
HHB: 4.7 % (ref 0–5)
HYPOCHROMIA: ABNORMAL
LAB: ABNORMAL
LYMPHOCYTES ABSOLUTE: 3.28 E9/L (ref 1.5–4)
LYMPHOCYTES RELATIVE PERCENT: 21 % (ref 20–42)
Lab: ABNORMAL
MAGNESIUM: 2.5 MG/DL (ref 1.6–2.6)
MCH RBC QN AUTO: 27.4 PG (ref 26–35)
MCHC RBC AUTO-ENTMCNC: 30 % (ref 32–34.5)
MCV RBC AUTO: 91.3 FL (ref 80–99.9)
METAMYELOCYTES RELATIVE PERCENT: 1 % (ref 0–1)
METHB: 0.4 % (ref 0–1.5)
MODE: ABNORMAL
MONOCYTES ABSOLUTE: 0.16 E9/L (ref 0.1–0.95)
MONOCYTES RELATIVE PERCENT: 1 % (ref 2–12)
MYELOCYTE PERCENT: 2 % (ref 0–0)
NEUTROPHILS ABSOLUTE: 12.01 E9/L (ref 1.8–7.3)
NEUTROPHILS RELATIVE PERCENT: 74 % (ref 43–80)
O2 CONTENT: 10.8 ML/DL
O2 SATURATION: 95.3 % (ref 92–98.5)
O2HB: 94.6 % (ref 94–97)
OPERATOR ID: 274
PATIENT TEMP: 37
PCO2: 29.9 MMHG (ref 35–45)
PDW BLD-RTO: 16.4 FL (ref 11.5–15)
PH BLOOD GAS: 7.43 (ref 7.35–7.45)
PHOSPHORUS: 2.8 MG/DL (ref 2.5–4.5)
PLATELET # BLD: 405 E9/L (ref 130–450)
PMV BLD AUTO: 10.4 FL (ref 7–12)
PO2: 78.7 MMHG (ref 75–100)
POLYCHROMASIA: ABNORMAL
POTASSIUM SERPL-SCNC: 3.8 MMOL/L (ref 3.5–5)
PROCALCITONIN: 0.19 NG/ML (ref 0–0.08)
RBC # BLD: 2.77 E12/L (ref 3.5–5.5)
SODIUM BLD-SCNC: 141 MMOL/L (ref 132–146)
SOURCE, BLOOD GAS: ABNORMAL
THB: 8 G/DL (ref 11.5–16.5)
TIME ANALYZED: 1453
TOTAL PROTEIN: 5.4 G/DL (ref 6.4–8.3)
WBC # BLD: 15.6 E9/L (ref 4.5–11.5)

## 2021-02-06 PROCEDURE — 84145 PROCALCITONIN (PCT): CPT

## 2021-02-06 PROCEDURE — 6360000002 HC RX W HCPCS: Performed by: NURSE PRACTITIONER

## 2021-02-06 PROCEDURE — 6370000000 HC RX 637 (ALT 250 FOR IP): Performed by: NURSE PRACTITIONER

## 2021-02-06 PROCEDURE — 6360000004 HC RX CONTRAST MEDICATION: Performed by: RADIOLOGY

## 2021-02-06 PROCEDURE — 70460 CT HEAD/BRAIN W/DYE: CPT

## 2021-02-06 PROCEDURE — 85014 HEMATOCRIT: CPT

## 2021-02-06 PROCEDURE — 82805 BLOOD GASES W/O2 SATURATION: CPT

## 2021-02-06 PROCEDURE — 2580000003 HC RX 258: Performed by: NURSE PRACTITIONER

## 2021-02-06 PROCEDURE — 6360000002 HC RX W HCPCS: Performed by: SPECIALIST

## 2021-02-06 PROCEDURE — 94640 AIRWAY INHALATION TREATMENT: CPT

## 2021-02-06 PROCEDURE — 80053 COMPREHEN METABOLIC PANEL: CPT

## 2021-02-06 PROCEDURE — 83735 ASSAY OF MAGNESIUM: CPT

## 2021-02-06 PROCEDURE — 82248 BILIRUBIN DIRECT: CPT

## 2021-02-06 PROCEDURE — 2060000000 HC ICU INTERMEDIATE R&B

## 2021-02-06 PROCEDURE — C9113 INJ PANTOPRAZOLE SODIUM, VIA: HCPCS | Performed by: NURSE PRACTITIONER

## 2021-02-06 PROCEDURE — 85025 COMPLETE CBC W/AUTO DIFF WBC: CPT

## 2021-02-06 PROCEDURE — 85018 HEMOGLOBIN: CPT

## 2021-02-06 PROCEDURE — 99232 SBSQ HOSP IP/OBS MODERATE 35: CPT | Performed by: SURGERY

## 2021-02-06 PROCEDURE — 36415 COLL VENOUS BLD VENIPUNCTURE: CPT

## 2021-02-06 PROCEDURE — 2580000003 HC RX 258: Performed by: SPECIALIST

## 2021-02-06 PROCEDURE — 84100 ASSAY OF PHOSPHORUS: CPT

## 2021-02-06 PROCEDURE — 6370000000 HC RX 637 (ALT 250 FOR IP): Performed by: SPECIALIST

## 2021-02-06 RX ADMIN — METOPROLOL TARTRATE 25 MG: 25 TABLET, FILM COATED ORAL at 00:01

## 2021-02-06 RX ADMIN — Medication 125 MG: at 00:01

## 2021-02-06 RX ADMIN — BUDESONIDE 500 MCG: 0.5 INHALANT RESPIRATORY (INHALATION) at 18:46

## 2021-02-06 RX ADMIN — PIPERACILLIN AND TAZOBACTAM 3375 MG: 3; .375 INJECTION, POWDER, FOR SOLUTION INTRAVENOUS at 15:42

## 2021-02-06 RX ADMIN — BUDESONIDE 500 MCG: 0.5 INHALANT RESPIRATORY (INHALATION) at 06:14

## 2021-02-06 RX ADMIN — Medication 125 MG: at 05:16

## 2021-02-06 RX ADMIN — ALBUTEROL SULFATE 2.5 MG: 2.5 SOLUTION RESPIRATORY (INHALATION) at 06:14

## 2021-02-06 RX ADMIN — METOPROLOL TARTRATE 25 MG: 25 TABLET, FILM COATED ORAL at 10:03

## 2021-02-06 RX ADMIN — VANCOMYCIN HYDROCHLORIDE 750 MG: 1 INJECTION, POWDER, LYOPHILIZED, FOR SOLUTION INTRAVENOUS at 12:00

## 2021-02-06 RX ADMIN — SODIUM CHLORIDE 3000 MG: 900 INJECTION INTRAVENOUS at 18:29

## 2021-02-06 RX ADMIN — Medication 125 MG: at 23:41

## 2021-02-06 RX ADMIN — Medication 125 MG: at 11:59

## 2021-02-06 RX ADMIN — Medication 10 ML: at 21:20

## 2021-02-06 RX ADMIN — PIPERACILLIN AND TAZOBACTAM 3375 MG: 3; .375 INJECTION, POWDER, FOR SOLUTION INTRAVENOUS at 05:16

## 2021-02-06 RX ADMIN — ARFORMOTEROL TARTRATE 15 MCG: 15 SOLUTION RESPIRATORY (INHALATION) at 18:46

## 2021-02-06 RX ADMIN — METOPROLOL TARTRATE 25 MG: 25 TABLET, FILM COATED ORAL at 21:19

## 2021-02-06 RX ADMIN — ENOXAPARIN SODIUM 50 MG: 60 INJECTION SUBCUTANEOUS at 10:03

## 2021-02-06 RX ADMIN — ARFORMOTEROL TARTRATE 15 MCG: 15 SOLUTION RESPIRATORY (INHALATION) at 06:13

## 2021-02-06 RX ADMIN — IOPAMIDOL 75 ML: 755 INJECTION, SOLUTION INTRAVENOUS at 17:12

## 2021-02-06 RX ADMIN — SODIUM CHLORIDE: 9 INJECTION, SOLUTION INTRAVENOUS at 02:03

## 2021-02-06 RX ADMIN — SODIUM CHLORIDE 3000 MG: 900 INJECTION INTRAVENOUS at 23:40

## 2021-02-06 RX ADMIN — SODIUM CHLORIDE: 9 INJECTION, SOLUTION INTRAVENOUS at 10:12

## 2021-02-06 RX ADMIN — PANTOPRAZOLE SODIUM 40 MG: 40 INJECTION, POWDER, FOR SOLUTION INTRAVENOUS at 10:04

## 2021-02-06 RX ADMIN — Medication 125 MG: at 18:30

## 2021-02-06 RX ADMIN — ENOXAPARIN SODIUM 50 MG: 60 INJECTION SUBCUTANEOUS at 21:20

## 2021-02-06 ASSESSMENT — PAIN SCALES - GENERAL: PAINLEVEL_OUTOF10: 2

## 2021-02-06 NOTE — PROGRESS NOTES
General Surgery Progress Note  Mitchell Bell MD, MS    Patient's Name/Date of Birth: Dagoberto Bo / 11/30/1925    Date: February 6, 2021     Surgeon: Tino Saxena MD    Chief Complaint: fecal impaction, GI bleed    Patient Active Problem List   Diagnosis    DVT (deep venous thrombosis) ([address]    Anemia    Rheumatoid arthritis ([address]    Cervical spondylosis    Lumbar spondylosis    Shoulder joint dysfunction    DDD (degenerative disc disease)    Spinal stenosis    Vertebral compression fracture     Bulging lumbar disc    Cervical spinal canal tumor    Dementia associated with other underlying disease with behavioral disturbance ([address]    COVID-19    Prediabetes    Severe sepsis (HCC)    Elevated troponin    Hypernatremia    Altered mental status    Fecal impaction ([address]       Subjective: No further overnight bleeding patient is nonverbal, discussed case with nursing    Objective:  BP (!) 111/56   Pulse 90   Temp 98.9 °F (37.2 °C) (Axillary)   Resp 17   Ht 5' 4\" (1.626 m)   Wt 141 lb 4.8 oz (64.1 kg)   SpO2 99%   BMI 24.25 kg/m²   Labs:  Recent Labs     02/04/21  0817 02/05/21  0604 02/05/21  0604 02/05/21  2105 02/06/21  0301 02/06/21  0912   WBC 19.2* 15.9*  --   --   --  15.6*   HGB 9.1* 8.6*   < > 8.2* 8.2* 7.6*   HCT 30.6* 29.4*   < > 26.6* 26.8* 25.3*    < > = values in this interval not displayed. Lab Results   Component Value Date    CREATININE 0.4 (L) 02/06/2021    BUN 11 02/06/2021     02/06/2021    K 3.8 02/06/2021     (H) 02/06/2021    CO2 19 (L) 02/06/2021     No results for input(s): LIPASE, AMYLASE in the last 72 hours.       General appearance:  NAD  Head: NCAT, PERRLA, EOMI, red conjunctiva  Neck: supple, no masses  Lungs: Equal chest rise bilateral  Heart: Reg rate  Abdomen: soft, nondistended, nontender  Skin; no lesions  Gu: no cva tenderness, no active bleeding  Extremities: Atraumatic      Assessment/Plan: Elvis Levine is a 80 y.o. female fecal impaction status post manual disimpaction, rectal bleeding likely secondary to hemorrhoidal irritation    Continue bowel regiment  No surgical intervention planned  Poor surgical candidate DNR CCA  Recommend comfort care    Physician Signature: Electronically signed by Dr. Madonna Law  [phone] (p)  2/6/2021  12:39 PM

## 2021-02-06 NOTE — PROGRESS NOTES
This RN received a phone call from patients daughter. Daughter Regan Silva states she would like to proceed with placement of PEG tube for nourishment. This RN perfect served Dr. Daniela Fraser and informed him of this. He responded and states this will be taken care of Monday. Patients daughter called back and left message letting her know this will happen Monday.      Electronically signed by Lisa Gillis RN on 2/6/2021 at 3:28 PM

## 2021-02-06 NOTE — PROGRESS NOTES
Patients NG was dislodged when this RN came on at 1515 from original insertion point. CXR obtained, see results. NG was removed and a new 16 fr NG was placed at the 53 hong in the right nares. CXR ordered.

## 2021-02-06 NOTE — PROGRESS NOTES
[facility]  [facility]  Progress Note    NAME: Florina Alan  MR:  [id number]  :   1925  DATE OF SERVICE:21    This is a face to face encounter with Teresa Diaz 80 y.o. female on 21  ID following for   Chief Complaint   Patient presents with    Fever     from NH, usually demented, SOB and fever per NH     SUBJECTIVE:  PT IN BED LETHARGIC   Afebrile ngt  2L  MS dec today   Patient is tolerating medications. No reported adverse drug reactions. Review of systems:  As stated above in the chief complaint, otherwise negative. Medications:  Scheduled Meds:   vancomycin  750 mg Intravenous Q18H    metoprolol tartrate  25 mg Per NG tube BID    sodium chloride flush  10 mL Intravenous 2 times per day    budesonide  0.5 mg Nebulization BID    pantoprazole  40 mg Intravenous Daily    Arformoterol Tartrate  15 mcg Nebulization BID    enoxaparin  1 mg/kg Subcutaneous BID    piperacillin-tazobactam  3,375 mg Intravenous Q8H    vancomycin  125 mg Oral 4 times per day     Continuous Infusions:   sodium chloride      0.45 % NaCl with KCl 20 mEq 60 mL/hr at 21 7464    sodium chloride Stopped (21 1215)     PRN Meds:sodium chloride, sodium chloride flush, acetaminophen **OR** acetaminophen, albuterol, magnesium sulfate, sodium phosphate IVPB **OR** sodium phosphate IVPB, potassium chloride    OBJECTIVE:  /89   Pulse 100   Temp 96.9 °F (36.1 °C) (Axillary)   Resp 24   Ht 5' 4\" (1.626 m)   Wt 141 lb 4.8 oz (64.1 kg)   SpO2 94%   BMI 24.25 kg/m²   Temp  Av.4 °F (36.9 °C)  Min: 96.9 °F (36.1 °C)  Max: 98.9 °F (37.2 °C)  Constitutional:  The patient is AROUSABLE  Skin:    Warm and dry. HEENT:      AT/NC  Chest:   No use of accessory muscles to breathe. Symmetrical expansion.   Dec bs ant no wheeze  Cardiovascular:  S1 and S2 are rhythmic and regular   Abdomen:      SOFT distended NGT distended Extremities:     Edema. RIGHT HEEL ESCHAR  CNS    Arouse briefly   Lines: pIV    Radiology:  Laboratory and Tests Review:  Lab Results   Component Value Date    WBC 15.6 (H) 02/06/2021    WBC 15.9 (H) 02/05/2021    WBC 19.2 (H) 02/04/2021    HGB 7.6 (L) 02/06/2021    HCT 25.3 (L) 02/06/2021    MCV 91.3 02/06/2021     02/06/2021     No results found for: [facility]  Lab Results   Component Value Date    ALT 14 02/06/2021    AST 41 (H) 02/06/2021    ALKPHOS 199 (H) 02/06/2021    BILITOT 0.3 02/06/2021     Lab Results   Component Value Date     02/06/2021    K 3.8 02/06/2021    K 3.7 02/02/2021     02/06/2021    CO2 19 02/06/2021    BUN 11 02/06/2021    CREATININE 0.4 02/06/2021    CREATININE 0.4 02/05/2021    CREATININE 0.4 02/04/2021    GFRAA >60 02/06/2021    LABGLOM >60 02/06/2021    GLUCOSE 106 02/06/2021    GLUCOSE 87 04/10/2012    PROT 5.4 02/06/2021    LABALBU 1.9 02/06/2021    LABALBU 4.1 04/10/2012    CALCIUM 7.7 02/06/2021    BILITOT 0.3 02/06/2021    ALKPHOS 199 02/06/2021    AST 41 02/06/2021    ALT 14 02/06/2021     Lab Results   Component Value Date    CRP 29.2 (H) 11/01/2020    CRP 6.3 (H) 04/10/2012     Lab Results   Component Value Date    SEDRATE 101 (H) 11/01/2020    SEDRATE 35 (H) 07/25/2013    SEDRATE 40 (H) 04/18/2013       Microbiology:   No results for input(s): COVID19 in the last 72 hours. Lab Results   Component Value Date    BLOODCULT2 24 Hours no growth 02/04/2021    BLOODCULT2 24 Hours no growth 02/02/2021    BLOODCULT2 5 Days no growth 11/01/2020    ORG Enterococcus faecalis 02/02/2021    ORG Staphylococcus coagulase-negative 02/02/2021    ORG Lactobacillus 10/22/2015        ASSESSMENT/PLAN:  SEPSIS  LEUKOCYTOSIS  HCAP VS ASPIRATION   DIVERTICULITIS/proctitis  UTI GPC E faecalis  Cons bacteremia contaminant  H/O COVID AB +  splenic infarct.         vancomycin (VANCOCIN) 750 mg in dextrose 5 % 250 mL IVPB, Q18H can stop   piperacillin-tazobactam (ZOSYN) 3,375 mg in dextrose 5 % 50 mL IVPB extended infusion (mini-bag), Q8    vancomycin (VANCOCIN) oral solution 125 mg, 4 times per day    CHECK FINAL CX  NUTRITION   For peg   Adjust atbx      · Monitor labs    Imaging and labs were reviewed per medical records. .  Thank you for involving me in the care of Teresa Diaz I will continue to follow. Please do not hesitate to call for any questions or concerns.     Electronically signed by Alyssa Rudd MD on 2/6/2021 at 4:44 PM

## 2021-02-06 NOTE — PROGRESS NOTES
· Serum creatinine today: 0.4; CrCl ~ 73 mL/min; baseline Scr ~ 0.4  · 2/5: Evening dose hung 46 minutes early and prior to trough. Called and had infusion stopped within 2-3 minutes. Trough drawn @ 2347 = 10.8 mcg/mL (2.5 hours late).  Estimated true trough ~ 12.3 mcg/mL    Plan:  · Vancomycin 750 mg IV Q18H  · Repeat trough as needed  · Follow renal function  · Pharmacist will follow and monitor/adjust dosing as necessary      Thank you for the consult,    Lasha Travis, PharmD 2/6/2021 2:14 PM   [phone]

## 2021-02-06 NOTE — PROGRESS NOTES
Internal Medicine Progress Note    [facility]ATUMA=[facility]    Aishwarya Lyle. Gilberto Sheriff, JFOORTIZ Jaime D.O., GANESH Lorenzo, MSN, APRN, NP-C  [place]. Pj Frazier, MSN, APRN-CNP     Primary Care Physician: Brigette De La Torre MD   Admitting Physician:  Georgina Louise DO  Admission date and time: 2/2/2021  9:53 AM    Room:  [address] [place] [address]  Admitting diagnosis: Severe sepsis [facility]) [A41.9, R65.20]    Patient Name: Andrea Leggett  MRN: [id number]    Date of Service: 2/6/2021     Subjective:  Miguel Gama is a 80 y.o. female who was seen and examined today,2/6/2021, at the bedside. Teresa was sleeping upon entering the room. She was very difficult to arouse today. With sternal rub she did open her eyes and moaned but did not attempt to interact. No family present. Review of System: Unable to be obtained secondary to underlying dementia/lethargy. Physical Exam:  I/O this shift:  In: -   Out: 150 [Urine:150]    Intake/Output Summary (Last 24 hours) at 2/6/2021 1351  Last data filed at 2/6/2021 1058  Gross per 24 hour   Intake 60 ml   Output 1300 ml   Net -1240 ml   I/O last 3 completed shifts: In: 370 [NG/GT:370]  Out: 1350 [Urine:1350]  Patient Vitals for the past 96 hrs (Last 3 readings):   Weight   02/05/21 0545 141 lb 4.8 oz (64.1 kg)   02/04/21 0118 146 lb 6 oz (66.4 kg)     Vital Signs:   Blood pressure (!) 111/56, pulse 90, temperature 98.9 °F (37.2 °C), temperature source Axillary, resp. rate 17, height 5' 4\" (1.626 m), weight 141 lb 4.8 oz (64.1 kg), SpO2 99 %. General appearance:  Lethargic, ill-appearing. In no acute distress. Head:  Normocephalic. No masses, lesions or tenderness. Eyes:  PERRLA. EOMI. Sclera clear. ENT:  Ears normal. Buccal mucosa dry. NG tube via right nostril. Missing teeth. Neck:    Supple. Trachea midline. No thyromegaly. No JVD. No bruits.   Heart: Rhythm regular. Regular rate, systolic murmur, pacemaker left chest-No overt signs of skin erosion. Lungs:    Symmetrical.  Diminished bibasilar, bilateral rhonchi  Abdomen:   Soft. Moderately distended and tender to palpation with a grimace elicited on palpation. Bowel sounds are hypoactive. Extremities:    Peripheral pulses present. No peripheral edema. Neurologic:    Lethargic, opens eyes to sternal rub. Does not answer questions or follow commands. Psych:   Lethargic. Musculoskeletal:   No joint edema, enlargement or tenderness appreciated gait not assessed. Integumentary:  No rashes  Skin normal color and texture. Patient has scattered ecchymotic areas on the upper extremities in various stages of healing. Genitalia/Breast:  Casey catheter.       Medication:  Scheduled Meds:   vancomycin  750 mg Intravenous Q18H    metoprolol tartrate  25 mg Per NG tube BID    sodium chloride flush  10 mL Intravenous 2 times per day    budesonide  0.5 mg Nebulization BID    pantoprazole  40 mg Intravenous Daily    Arformoterol Tartrate  15 mcg Nebulization BID    enoxaparin  1 mg/kg Subcutaneous BID    piperacillin-tazobactam  3,375 mg Intravenous Q8H    vancomycin  125 mg Oral 4 times per day     Continuous Infusions:   sodium chloride      0.45 % NaCl with KCl 20 mEq 60 mL/hr at 02/05/21 0613    sodium chloride Stopped (02/06/21 1215)       Objective Data:  CBC with Differential:    Lab Results   Component Value Date    WBC 15.6 02/06/2021    RBC 2.77 02/06/2021    HGB 7.6 02/06/2021    HCT 25.3 02/06/2021     02/06/2021    MCV 91.3 02/06/2021    MCH 27.4 02/06/2021    MCHC 30.0 02/06/2021    RDW 16.4 02/06/2021    NRBC 1.0 11/12/2020    SEGSPCT 58 05/06/2013    METASPCT 1.0 02/06/2021    LYMPHOPCT 21.0 02/06/2021    PROMYELOPCT 4.0 11/12/2020    MONOPCT 1.0 02/06/2021    MYELOPCT 2.0 02/06/2021    BASOPCT 0.0 02/06/2021    MONOSABS 0.16 02/06/2021    LYMPHSABS 3.28 02/06/2021 EOSABS 0.16 02/06/2021    BASOSABS 0.00 02/06/2021     BMP:    Lab Results   Component Value Date     02/06/2021    K 3.8 02/06/2021    K 3.7 02/02/2021     02/06/2021    CO2 19 02/06/2021    BUN 11 02/06/2021    LABALBU 1.9 02/06/2021    LABALBU 4.1 04/10/2012    CREATININE 0.4 02/06/2021    CALCIUM 7.7 02/06/2021    GFRAA >60 02/06/2021    LABGLOM >60 02/06/2021    GLUCOSE 106 02/06/2021    GLUCOSE 87 04/10/2012     Last 3 Troponin:    Lab Results   Component Value Date    TROPONINI 0.07 02/02/2021    TROPONINI <0.01 11/01/2020    TROPONINI 0.09 11/01/2020     U/A:    Lab Results   Component Value Date    COLORU Straw 02/02/2021    PROTEINU 30 02/02/2021    PHUR 5.5 02/02/2021    WBCUA >20 02/02/2021    WBCUA NONE 11/10/2010    RBCUA 2-5 02/02/2021    RBCUA NONE 11/10/2010    BACTERIA MANY 02/02/2021    CLARITYU CLOUDY 02/02/2021    SPECGRAV >=1.030 02/02/2021    LEUKOCYTESUR MODERATE 02/02/2021    UROBILINOGEN 0.2 02/02/2021    BILIRUBINUR Negative 02/02/2021    BILIRUBINUR NEGATIVE 11/10/2010    BLOODU MODERATE 02/02/2021    GLUCOSEU Negative 02/02/2021    GLUCOSEU NEGATIVE 11/10/2010       Wound Documentation:   Wound 11/01/20 Elbow Right (Active)   Number of days: 93       Wound 11/01/20 Heel Right;Lateral (Active)   Wound Etiology Pressure Unstageable 02/02/21 1716   Dressing Status Intact 02/02/21 2013   Wound Cleansed Not Cleansed 02/02/21 1716   Dressing/Treatment Foam 02/02/21 2013   Dressing Change Due 02/09/21 02/02/21 1716   Wound Assessment Dry;Eschar dry 02/02/21 1716   Drainage Amount None 02/02/21 2013   Odor None 02/02/21 1716   Arelis-wound Assessment Intact 02/02/21 1716   Number of days: 93       Wound 11/01/20 Heel Left;Lateral (Active)   Dressing Status Intact 02/02/21 2013   Dressing/Treatment Foam 02/02/21 2013   Number of days: 93       Wound 02/02/21 Sacrum Mid pinpoint sized (Active)   Wound Etiology Pressure Stage  2 02/02/21 1726   Dressing Status Intact 02/02/21 2013 Wound Cleansed Cleansed with saline 02/02/21 1726   Dressing/Treatment Foam 02/02/21 2013   Dressing Change Due 02/09/21 02/02/21 1726   Wound Length (cm) 0.25 cm 02/02/21 1927   Wound Width (cm) 0.25 cm 02/02/21 1927   Wound Surface Area (cm^2) 0.06 cm^2 02/02/21 1927   Drainage Amount None 02/02/21 2013   Odor None 02/02/21 1726   Arelis-wound Assessment Intact 02/02/21 1726   Number of days: 0       Assessment:  · Severe sepsis associated with acute organ dysfunction  · Enterococcus faecalis urinary tract infection secondary to chronic indwelling urinary catheter  · Acute respiratory failure with hypoxia in the setting of bilateral pneumonia with recent Covid infection (11/2020)  · Hypovolemic hypernatremia and hyperchloremia due to poor intake and dehydration  · Elevated troponin, likely non-STEMI due to sepsis  · Acute diverticulitis   · Abnormal appearance of the spleen on imaging with concern for splenic infarct   · Mild normocytic normochromic anemia  · Acute metabolic encephalopathy superimposed on advanced dementia  · History of Covid antibodies    Plan: Patient is lethargic. Last evening Dr. Abisai Pineda was notified that the patient had medium liquid bowel movement with domingo blood and bright red clots followed by brown stool. General surgery is following. Also notified last evening that the NG tube became dislodged. New NG tube has since been placed. I did contact the patient's daughter, Meryle Lapine, at [phone]. We discussed patient's current health status. Discussed that the patient's NG tube had become dislodged and that this does increase risk for aspiration. She does verbalize an understanding. Discussed PEG tube placement as Susan England did verbalize that she is still in shock over the patient's current health status. She states the patient has been doing very well prior to this current event. States she has had no time to cope and was not ready to have conversation with the palliative care team.  States that she would like to discuss PEG tube placement with her brother. Verbalized an understanding that even with PEG tube placement for nutritional support the patient's current health status may not improve. Would like to give the patient nutrition in order to try to improve her current health status and then understands that if the patient continues to decline she and her brother can revisit palliative care for end-of-life care. Susan England will call the unit should she and her brother decide to proceed with PEG tube placement. Continue antibiotic therapy  Multiple subspecialist continue to provide consultation and recommendations of been reviewed. The patient's long-term prognosis is very poor at this point and the palliative care team continues to follow. More than 50% of my  time was spent at the bedside counseling/coordinating care with the patient and/or family with face to face contact. This time was spent reviewing notes and laboratory data as well as instructing and counseling the patient. Time I spent with the family or surrogate(s) is included only if the patient was incapable of providing the necessary information or participating in medical decisions. I also discussed the differential diagnosis and all of the proposed management plans with the patient and individuals accompanying the patientSamantha Moore requires this high level of physician care and nursing on the IMC/Telemetry unit due the complexity of decision management and chance of rapid decline or death. Continued cardiac monitoring and higher level of nursing are required. I am readily available for any further decision-making and intervention. The patient was seen, examined and then discussed with Dr. Kolby Mendoza. PAUL Brewer CNP  2/6/2021  1:51 PM         I saw and evaluated the patient. I agree with the findings and the plan of care as documented in Gab Charlton NP-C's  note.     Cristin Marcos D.O., [facility]  2:03 PM  2/6/2021

## 2021-02-06 NOTE — PROGRESS NOTES
2200 Unable to determine if the NG is the correct spot, readvanced and xray done at beside showed Ng in the correct position. Position assessed w/ air present in correct area. Spoke to Dr. Rachele Orta, ok to use NG and continue tube feed.  Pt calm and resting.    0400 pt tolerating TF, position assessed and heard in correct position, no residual.

## 2021-02-07 LAB
ALBUMIN SERPL-MCNC: 2.1 G/DL (ref 3.5–5.2)
ALP BLD-CCNC: 98 U/L (ref 35–104)
ALT SERPL-CCNC: 12 U/L (ref 0–32)
ANISOCYTOSIS: ABNORMAL
AST SERPL-CCNC: 27 U/L (ref 0–31)
BASOPHILS ABSOLUTE: 0 E9/L (ref 0–0.2)
BASOPHILS RELATIVE PERCENT: 0.4 % (ref 0–2)
BILIRUB SERPL-MCNC: <0.2 MG/DL (ref 0–1.2)
BILIRUBIN DIRECT: <0.2 MG/DL (ref 0–0.3)
BILIRUBIN, INDIRECT: ABNORMAL MG/DL (ref 0–1)
BLOOD CULTURE, ROUTINE: NORMAL
CK MB: 2 NG/ML (ref 0–4.3)
CULTURE, BLOOD 2: NORMAL
EKG ATRIAL RATE: 106 BPM
EKG P AXIS: 35 DEGREES
EKG P-R INTERVAL: 130 MS
EKG Q-T INTERVAL: 350 MS
EKG QRS DURATION: 70 MS
EKG QTC CALCULATION (BAZETT): 464 MS
EKG R AXIS: 25 DEGREES
EKG T AXIS: 67 DEGREES
EKG VENTRICULAR RATE: 106 BPM
EOSINOPHILS ABSOLUTE: 0.33 E9/L (ref 0.05–0.5)
EOSINOPHILS RELATIVE PERCENT: 2.6 % (ref 0–6)
HCT VFR BLD CALC: 24.7 % (ref 34–48)
HEMOGLOBIN: 7.8 G/DL (ref 11.5–15.5)
HYPOCHROMIA: ABNORMAL
LYMPHOCYTES ABSOLUTE: 2.03 E9/L (ref 1.5–4)
LYMPHOCYTES RELATIVE PERCENT: 15.7 % (ref 20–42)
MAGNESIUM: 2.1 MG/DL (ref 1.6–2.6)
MCH RBC QN AUTO: 28 PG (ref 26–35)
MCHC RBC AUTO-ENTMCNC: 31.6 % (ref 32–34.5)
MCV RBC AUTO: 88.5 FL (ref 80–99.9)
METAMYELOCYTES RELATIVE PERCENT: 1.7 % (ref 0–1)
MONOCYTES ABSOLUTE: 0.51 E9/L (ref 0.1–0.95)
MONOCYTES RELATIVE PERCENT: 3.5 % (ref 2–12)
MYELOCYTE PERCENT: 6.1 % (ref 0–0)
NEUTROPHILS ABSOLUTE: 9.91 E9/L (ref 1.8–7.3)
NEUTROPHILS RELATIVE PERCENT: 70.4 % (ref 43–80)
NUCLEATED RED BLOOD CELLS: 0.9 /100 WBC
OVALOCYTES: ABNORMAL
PDW BLD-RTO: 16 FL (ref 11.5–15)
PHOSPHORUS: 2.4 MG/DL (ref 2.5–4.5)
PLATELET # BLD: 459 E9/L (ref 130–450)
PMV BLD AUTO: 10.2 FL (ref 7–12)
POIKILOCYTES: ABNORMAL
POLYCHROMASIA: ABNORMAL
RBC # BLD: 2.79 E12/L (ref 3.5–5.5)
TOTAL CK: 32 U/L (ref 20–180)
TOTAL PROTEIN: 5.5 G/DL (ref 6.4–8.3)
TROPONIN: 0.02 NG/ML (ref 0–0.03)
WBC # BLD: 12.7 E9/L (ref 4.5–11.5)

## 2021-02-07 PROCEDURE — 2580000003 HC RX 258: Performed by: NURSE PRACTITIONER

## 2021-02-07 PROCEDURE — 83735 ASSAY OF MAGNESIUM: CPT

## 2021-02-07 PROCEDURE — 6360000002 HC RX W HCPCS: Performed by: NURSE PRACTITIONER

## 2021-02-07 PROCEDURE — P9047 ALBUMIN (HUMAN), 25%, 50ML: HCPCS | Performed by: INTERNAL MEDICINE

## 2021-02-07 PROCEDURE — 84484 ASSAY OF TROPONIN QUANT: CPT

## 2021-02-07 PROCEDURE — 94640 AIRWAY INHALATION TREATMENT: CPT

## 2021-02-07 PROCEDURE — C9113 INJ PANTOPRAZOLE SODIUM, VIA: HCPCS | Performed by: NURSE PRACTITIONER

## 2021-02-07 PROCEDURE — 36415 COLL VENOUS BLD VENIPUNCTURE: CPT

## 2021-02-07 PROCEDURE — 82553 CREATINE MB FRACTION: CPT

## 2021-02-07 PROCEDURE — 6370000000 HC RX 637 (ALT 250 FOR IP): Performed by: NURSE PRACTITIONER

## 2021-02-07 PROCEDURE — 85025 COMPLETE CBC W/AUTO DIFF WBC: CPT

## 2021-02-07 PROCEDURE — 2060000000 HC ICU INTERMEDIATE R&B

## 2021-02-07 PROCEDURE — 93005 ELECTROCARDIOGRAM TRACING: CPT | Performed by: INTERNAL MEDICINE

## 2021-02-07 PROCEDURE — 2580000003 HC RX 258: Performed by: SPECIALIST

## 2021-02-07 PROCEDURE — 6360000002 HC RX W HCPCS: Performed by: INTERNAL MEDICINE

## 2021-02-07 PROCEDURE — 80076 HEPATIC FUNCTION PANEL: CPT

## 2021-02-07 PROCEDURE — 6360000002 HC RX W HCPCS: Performed by: SPECIALIST

## 2021-02-07 PROCEDURE — 82550 ASSAY OF CK (CPK): CPT

## 2021-02-07 PROCEDURE — 99232 SBSQ HOSP IP/OBS MODERATE 35: CPT | Performed by: SURGERY

## 2021-02-07 PROCEDURE — 84100 ASSAY OF PHOSPHORUS: CPT

## 2021-02-07 PROCEDURE — 6370000000 HC RX 637 (ALT 250 FOR IP): Performed by: SPECIALIST

## 2021-02-07 RX ORDER — ALBUMIN (HUMAN) 12.5 G/50ML
25 SOLUTION INTRAVENOUS EVERY 12 HOURS
Status: DISCONTINUED | OUTPATIENT
Start: 2021-02-07 | End: 2021-02-09

## 2021-02-07 RX ADMIN — METOPROLOL TARTRATE 25 MG: 25 TABLET, FILM COATED ORAL at 09:59

## 2021-02-07 RX ADMIN — BUDESONIDE 500 MCG: 0.5 INHALANT RESPIRATORY (INHALATION) at 06:59

## 2021-02-07 RX ADMIN — Medication 125 MG: at 12:51

## 2021-02-07 RX ADMIN — BUDESONIDE 500 MCG: 0.5 INHALANT RESPIRATORY (INHALATION) at 17:00

## 2021-02-07 RX ADMIN — ARFORMOTEROL TARTRATE 15 MCG: 15 SOLUTION RESPIRATORY (INHALATION) at 06:59

## 2021-02-07 RX ADMIN — Medication 125 MG: at 05:45

## 2021-02-07 RX ADMIN — PANTOPRAZOLE SODIUM 40 MG: 40 INJECTION, POWDER, FOR SOLUTION INTRAVENOUS at 09:59

## 2021-02-07 RX ADMIN — SODIUM CHLORIDE 3000 MG: 900 INJECTION INTRAVENOUS at 23:02

## 2021-02-07 RX ADMIN — ENOXAPARIN SODIUM 50 MG: 60 INJECTION SUBCUTANEOUS at 09:59

## 2021-02-07 RX ADMIN — SODIUM CHLORIDE 3000 MG: 900 INJECTION INTRAVENOUS at 12:51

## 2021-02-07 RX ADMIN — SODIUM CHLORIDE 3000 MG: 900 INJECTION INTRAVENOUS at 18:11

## 2021-02-07 RX ADMIN — METOPROLOL TARTRATE 25 MG: 25 TABLET, FILM COATED ORAL at 21:50

## 2021-02-07 RX ADMIN — Medication 10 ML: at 12:52

## 2021-02-07 RX ADMIN — SODIUM CHLORIDE 3000 MG: 900 INJECTION INTRAVENOUS at 05:24

## 2021-02-07 RX ADMIN — ALBUTEROL SULFATE 2.5 MG: 2.5 SOLUTION RESPIRATORY (INHALATION) at 17:00

## 2021-02-07 RX ADMIN — ARFORMOTEROL TARTRATE 15 MCG: 15 SOLUTION RESPIRATORY (INHALATION) at 17:00

## 2021-02-07 RX ADMIN — Medication 125 MG: at 18:39

## 2021-02-07 RX ADMIN — ALBUMIN (HUMAN) 25 G: 0.25 INJECTION, SOLUTION INTRAVENOUS at 16:39

## 2021-02-07 ASSESSMENT — PAIN SCALES - GENERAL
PAINLEVEL_OUTOF10: 0

## 2021-02-07 NOTE — PROGRESS NOTES
Pharmacy Consultation Note  (Antibiotic Dosing and Monitoring)    Initial consult date: 2/2  Consulting provider: PAUL Hoyt CNP  Drug(s): Vancomycin IV  Indication: Sepsis      Vancomycin IV discontinued by ID. Pharmacy will sign off on the consult. Please re-consult if needed.       Thank you for the consult,    Pat Sellers, PharmD 2/7/2021 1:12 PM   [phone]

## 2021-02-07 NOTE — PROGRESS NOTES
Rhythm regular. Regular rate, systolic murmur, pacemaker left chest-No overt signs of skin erosion. Lungs:    Symmetrical.  Diminished bibasilar, bilateral rhonchi  Abdomen:   Soft. Moderately distended and tender to palpation with a grimace elicited on palpation. Bowel sounds are hypoactive. Extremities:    Peripheral pulses present. No peripheral edema. Neurologic:    Lethargic, opens eyes to sternal rub. Does not answer questions or follow commands. Psych:   Lethargic. Musculoskeletal:   No joint edema, enlargement or tenderness appreciated gait not assessed. Integumentary:  No rashes  Skin normal color and texture. Patient has scattered ecchymotic areas on the upper extremities in various stages of healing. Genitalia/Breast:  Casey catheter.       Medication:  Scheduled Meds:   ampicillin-sulbactam  3,000 mg Intravenous Q6H    metoprolol tartrate  25 mg Per NG tube BID    sodium chloride flush  10 mL Intravenous 2 times per day    budesonide  0.5 mg Nebulization BID    pantoprazole  40 mg Intravenous Daily    Arformoterol Tartrate  15 mcg Nebulization BID    [Held by provider] enoxaparin  1 mg/kg Subcutaneous BID    vancomycin  125 mg Oral 4 times per day     Continuous Infusions:   sodium chloride      0.45 % NaCl with KCl 20 mEq 60 mL/hr at 02/05/21 0613    sodium chloride Stopped (02/06/21 1215)       Objective Data:  CBC with Differential:    Lab Results   Component Value Date    WBC 12.7 02/07/2021    RBC 2.79 02/07/2021    HGB 7.8 02/07/2021    HCT 24.7 02/07/2021     02/07/2021    MCV 88.5 02/07/2021    MCH 28.0 02/07/2021    MCHC 31.6 02/07/2021    RDW 16.0 02/07/2021    NRBC 0.9 02/07/2021    SEGSPCT 58 05/06/2013    METASPCT 1.7 02/07/2021    LYMPHOPCT 15.7 02/07/2021    PROMYELOPCT 4.0 11/12/2020    MONOPCT 3.5 02/07/2021    MYELOPCT 6.1 02/07/2021    BASOPCT 0.4 02/07/2021    MONOSABS 0.51 02/07/2021    LYMPHSABS 2.03 02/07/2021    EOSABS 0.33 02/07/2021 BASOSABS 0.00 02/07/2021     BMP:    Lab Results   Component Value Date     02/06/2021    K 3.8 02/06/2021    K 3.7 02/02/2021     02/06/2021    CO2 19 02/06/2021    BUN 11 02/06/2021    LABALBU 2.1 02/07/2021    LABALBU 4.1 04/10/2012    CREATININE 0.4 02/06/2021    CALCIUM 7.7 02/06/2021    GFRAA >60 02/06/2021    LABGLOM >60 02/06/2021    GLUCOSE 106 02/06/2021    GLUCOSE 87 04/10/2012     Last 3 Troponin:    Lab Results   Component Value Date    TROPONINI 0.02 02/07/2021    TROPONINI 0.07 02/02/2021    TROPONINI <0.01 11/01/2020     U/A:    Lab Results   Component Value Date    COLORU Straw 02/02/2021    PROTEINU 30 02/02/2021    PHUR 5.5 02/02/2021    WBCUA >20 02/02/2021    WBCUA NONE 11/10/2010    RBCUA 2-5 02/02/2021    RBCUA NONE 11/10/2010    BACTERIA MANY 02/02/2021    CLARITYU CLOUDY 02/02/2021    SPECGRAV >=1.030 02/02/2021    LEUKOCYTESUR MODERATE 02/02/2021    UROBILINOGEN 0.2 02/02/2021    BILIRUBINUR Negative 02/02/2021    BILIRUBINUR NEGATIVE 11/10/2010    BLOODU MODERATE 02/02/2021    GLUCOSEU Negative 02/02/2021    GLUCOSEU NEGATIVE 11/10/2010       Wound Documentation:   Wound 11/01/20 Elbow Right (Active)   Number of days: 93       Wound 11/01/20 Heel Right;Lateral (Active)   Wound Etiology Pressure Unstageable 02/02/21 1716   Dressing Status Intact 02/02/21 2013   Wound Cleansed Not Cleansed 02/02/21 1716   Dressing/Treatment Foam 02/02/21 2013   Dressing Change Due 02/09/21 02/02/21 1716   Wound Assessment Dry;Eschar dry 02/02/21 1716   Drainage Amount None 02/02/21 2013   Odor None 02/02/21 1716   Arelis-wound Assessment Intact 02/02/21 1716   Number of days: 93       Wound 11/01/20 Heel Left;Lateral (Active)   Dressing Status Intact 02/02/21 2013   Dressing/Treatment Foam 02/02/21 2013   Number of days: 93       Wound 02/02/21 Sacrum Mid pinpoint sized (Active)   Wound Etiology Pressure Stage  2 02/02/21 1726   Dressing Status Intact 02/02/21 2013 Wound Cleansed Cleansed with saline 02/02/21 1726   Dressing/Treatment Foam 02/02/21 2013   Dressing Change Due 02/09/21 02/02/21 1726   Wound Length (cm) 0.25 cm 02/02/21 1927   Wound Width (cm) 0.25 cm 02/02/21 1927   Wound Surface Area (cm^2) 0.06 cm^2 02/02/21 1927   Drainage Amount None 02/02/21 2013   Odor None 02/02/21 1726   Arelis-wound Assessment Intact 02/02/21 1726   Number of days: 0       Assessment:    · Severe sepsis associated with acute organ dysfunction  · Enterococcus faecalis urinary tract infection secondary to chronic indwelling urinary catheter  · Acute respiratory failure with hypoxia in the setting of bilateral pneumonia with recent Covid infection (11/2020)  · Hypovolemic hypernatremia and hyperchloremia due to poor intake and dehydration  · Elevated troponin, likely non-STEMI due to sepsis  · Acute diverticulitis   · Abnormal appearance of the spleen on imaging with concern for splenic infarct   · Mild normocytic normochromic anemia  · Acute metabolic encephalopathy superimposed on advanced dementia  · History of Covid antibodies    Plan:     · Family has agreed on PEG tube insertion. Scheduled for tomorrow  · CT showed no acute findings  · Continue antibiotic therapy and follow with multiple subspecialists  · Repeat lab work accordingly  · Correct Hypophosphatemia      More than 50% of my  time was spent at the bedside counseling/coordinating care with the patient and/or family with face to face contact. This time was spent reviewing notes and laboratory data as well as instructing and counseling the patient. Time I spent with the family or surrogate(s) is included only if the patient was incapable of providing the necessary information or participating in medical decisions. I also discussed the differential diagnosis and all of the proposed management plans with the patient and individuals accompanying the patient.

## 2021-02-07 NOTE — PROGRESS NOTES
General Surgery Progress Note  Michel Richards MD, MS    Patient's Name/Date of Birth: Asael Corbin / 11/30/1925    Date: February 7, 2021     Surgeon: Uriah Mora MD    Chief Complaint: fecal impaction, GI bleed    Patient Active Problem List   Diagnosis    DVT (deep venous thrombosis) ([address]    Anemia    Rheumatoid arthritis ([address]    Cervical spondylosis    Lumbar spondylosis    Shoulder joint dysfunction    DDD (degenerative disc disease)    Spinal stenosis    Vertebral compression fracture     Bulging lumbar disc    Cervical spinal canal tumor    Dementia associated with other underlying disease with behavioral disturbance ([address]    COVID-19    Prediabetes    Severe sepsis (HCC)    Elevated troponin    Hypernatremia    Altered mental status    Fecal impaction ([address]       Subjective: no acute changes    Objective:  /67   Pulse 105   Temp 98.7 °F (37.1 °C) (Axillary)   Resp 18   Ht 5' 4\" (1.626 m)   Wt 141 lb 4.8 oz (64.1 kg)   SpO2 97%   BMI 24.25 kg/m²   Labs:  Recent Labs     02/05/21  0604 02/05/21  0604 02/06/21  0301 02/06/21  0912 02/07/21  0542   WBC 15.9*  --   --  15.6* 12.7*   HGB 8.6*   < > 8.2* 7.6* 7.8*   HCT 29.4*   < > 26.8* 25.3* 24.7*    < > = values in this interval not displayed. Lab Results   Component Value Date    CREATININE 0.4 (L) 02/06/2021    BUN 11 02/06/2021     02/06/2021    K 3.8 02/06/2021     (H) 02/06/2021    CO2 19 (L) 02/06/2021     No results for input(s): LIPASE, AMYLASE in the last 72 hours.       General appearance:  NAD, nonverbal  Head: NCAT, PERRLA, EOMI, red conjunctiva  Neck: supple, no masses  Lungs: Equal chest rise bilateral  Heart: Reg rate  Abdomen: soft, nondistended, nontender  Skin; no lesions  Gu: no cva tenderness, no active bleeding  Extremities: Atraumatic      Assessment/Plan: Margarita Miller is a 80 y.o. female fecal impaction status post manual disimpaction, severe protein calorie malnutrition, dysphagia    PEG tomorrow per family request  Hold lovenox today    Physician Signature: Electronically signed by Dr. Felipe Lowe  [phone] (p)  2/7/2021  11:59 AM

## 2021-02-07 NOTE — PROGRESS NOTES
[facility]  [facility]  Progress Note    NAME: Jesus Orellana  MR:  [id number]  :   1925  DATE OF SERVICE:21    This is a face to face encounter with [place] A Joe 80 y.o. female on 21  ID following for   Chief Complaint   Patient presents with    Fever     from NH, usually demented, SOB and fever per NH     SUBJECTIVE:  PT IN BED LETHARGIC   Does not wake up   Afebrile ngt  2L      Patient is tolerating medications. No reported adverse drug reactions. Review of systems:  As stated above in the chief complaint, otherwise negative. Medications:  Scheduled Meds:   ampicillin-sulbactam  3,000 mg Intravenous Q6H    metoprolol tartrate  25 mg Per NG tube BID    sodium chloride flush  10 mL Intravenous 2 times per day    budesonide  0.5 mg Nebulization BID    pantoprazole  40 mg Intravenous Daily    Arformoterol Tartrate  15 mcg Nebulization BID    enoxaparin  1 mg/kg Subcutaneous BID    vancomycin  125 mg Oral 4 times per day     Continuous Infusions:   sodium chloride      0.45 % NaCl with KCl 20 mEq 60 mL/hr at 21 4591    sodium chloride Stopped (21 1215)     PRN Meds:sodium chloride, sodium chloride flush, acetaminophen **OR** acetaminophen, albuterol, magnesium sulfate, sodium phosphate IVPB **OR** sodium phosphate IVPB, potassium chloride    OBJECTIVE:  /67   Pulse 105   Temp 98.7 °F (37.1 °C) (Axillary)   Resp 18   Ht 5' 4\" (1.626 m)   Wt 141 lb 4.8 oz (64.1 kg)   SpO2 97%   BMI 24.25 kg/m²   Temp  Av °F (36.7 °C)  Min: 96.9 °F (36.1 °C)  Max: 98.7 °F (37.1 °C)  Constitutional:  The patient is not AROUSABLE swelling  Skin:    Warm and dry. HEENT:      AT/NC  Chest:     Symmetrical expansion. Dec bs ant no wheeze  Cardiovascular:  S1 and S2 are rhythmic and regular   Abdomen:      SOFT distended NGT distended  Extremities:     Edema.  RIGHT HEEL ESCHAR dressed  CNS    lethargic  Lines: pIV    Radiology: Laboratory and Tests Review:  Lab Results   Component Value Date    WBC 12.7 (H) 02/07/2021    WBC 15.6 (H) 02/06/2021    WBC 15.9 (H) 02/05/2021    HGB 7.8 (L) 02/07/2021    HCT 24.7 (L) 02/07/2021    MCV 88.5 02/07/2021     (H) 02/07/2021     No results found for: [facility]  Lab Results   Component Value Date    ALT 12 02/07/2021    AST 27 02/07/2021    ALKPHOS 98 02/07/2021    BILITOT <0.2 02/07/2021     Lab Results   Component Value Date     02/06/2021    K 3.8 02/06/2021    K 3.7 02/02/2021     02/06/2021    CO2 19 02/06/2021    BUN 11 02/06/2021    CREATININE 0.4 02/06/2021    CREATININE 0.4 02/05/2021    CREATININE 0.4 02/04/2021    GFRAA >60 02/06/2021    LABGLOM >60 02/06/2021    GLUCOSE 106 02/06/2021    GLUCOSE 87 04/10/2012    PROT 5.5 02/07/2021    LABALBU 2.1 02/07/2021    LABALBU 4.1 04/10/2012    CALCIUM 7.7 02/06/2021    BILITOT <0.2 02/07/2021    ALKPHOS 98 02/07/2021    AST 27 02/07/2021    ALT 12 02/07/2021     Lab Results   Component Value Date    CRP 29.2 (H) 11/01/2020    CRP 6.3 (H) 04/10/2012     Lab Results   Component Value Date    SEDRATE 101 (H) 11/01/2020    SEDRATE 35 (H) 07/25/2013    SEDRATE 40 (H) 04/18/2013       Microbiology:   No results for input(s): COVID19 in the last 72 hours. Lab Results   Component Value Date    BLOODCULT2 24 Hours no growth 02/04/2021    BLOODCULT2 24 Hours no growth 02/02/2021    BLOODCULT2 5 Days no growth 11/01/2020    ORG Enterococcus faecalis 02/02/2021    ORG Staphylococcus coagulase-negative 02/02/2021    ORG Lactobacillus 10/22/2015        ASSESSMENT/PLAN:  SEPSIS  LEUKOCYTOSIS dec  HCAP VS ASPIRATION   DIVERTICULITIS/proctitis  UTI GPC E faecalis  Cons bacteremia contaminant  H/O COVID AB +  splenic infarct.           ampicillin-sulbactam (UNASYN) 3000 mg ivpb minibag, Q6H can switch to augmentin after peg    vancomycin (VANCOCIN) oral solution 125 mg, 4 times per day        For peg          · Monitor labs

## 2021-02-08 ENCOUNTER — ANESTHESIA EVENT (OUTPATIENT)
Dept: ENDOSCOPY | Age: 86
DRG: 698 | End: 2021-02-08
Payer: MEDICARE

## 2021-02-08 ENCOUNTER — ANESTHESIA (OUTPATIENT)
Dept: ENDOSCOPY | Age: 86
DRG: 698 | End: 2021-02-08
Payer: MEDICARE

## 2021-02-08 VITALS — SYSTOLIC BLOOD PRESSURE: 110 MMHG | OXYGEN SATURATION: 92 % | DIASTOLIC BLOOD PRESSURE: 60 MMHG

## 2021-02-08 LAB
ALBUMIN SERPL-MCNC: 2.6 G/DL (ref 3.5–5.2)
ALP BLD-CCNC: 89 U/L (ref 35–104)
ALT SERPL-CCNC: 12 U/L (ref 0–32)
ANISOCYTOSIS: ABNORMAL
AST SERPL-CCNC: 22 U/L (ref 0–31)
BASOPHILS ABSOLUTE: 0 E9/L (ref 0–0.2)
BASOPHILS RELATIVE PERCENT: 0.4 % (ref 0–2)
BILIRUB SERPL-MCNC: 0.2 MG/DL (ref 0–1.2)
BILIRUBIN DIRECT: <0.2 MG/DL (ref 0–0.3)
BILIRUBIN, INDIRECT: ABNORMAL MG/DL (ref 0–1)
BURR CELLS: ABNORMAL
EOSINOPHILS ABSOLUTE: 0.29 E9/L (ref 0.05–0.5)
EOSINOPHILS RELATIVE PERCENT: 2.6 % (ref 0–6)
HCT VFR BLD CALC: 24.1 % (ref 34–48)
HEMOGLOBIN: 7.3 G/DL (ref 11.5–15.5)
LYMPHOCYTES ABSOLUTE: 3.05 E9/L (ref 1.5–4)
LYMPHOCYTES RELATIVE PERCENT: 26.7 % (ref 20–42)
MAGNESIUM: 2 MG/DL (ref 1.6–2.6)
MCH RBC QN AUTO: 27.5 PG (ref 26–35)
MCHC RBC AUTO-ENTMCNC: 30.3 % (ref 32–34.5)
MCV RBC AUTO: 90.9 FL (ref 80–99.9)
MONOCYTES ABSOLUTE: 1.13 E9/L (ref 0.1–0.95)
MONOCYTES RELATIVE PERCENT: 9.5 % (ref 2–12)
MYELOCYTE PERCENT: 6 % (ref 0–0)
NEUTROPHILS ABSOLUTE: 6.89 E9/L (ref 1.8–7.3)
NEUTROPHILS RELATIVE PERCENT: 55.2 % (ref 43–80)
OVALOCYTES: ABNORMAL
PDW BLD-RTO: 16.3 FL (ref 11.5–15)
PHOSPHORUS: 2.3 MG/DL (ref 2.5–4.5)
PLATELET # BLD: 488 E9/L (ref 130–450)
PMV BLD AUTO: 10.1 FL (ref 7–12)
POIKILOCYTES: ABNORMAL
POLYCHROMASIA: ABNORMAL
RBC # BLD: 2.65 E12/L (ref 3.5–5.5)
TEAR DROP CELLS: ABNORMAL
TOTAL PROTEIN: 5.7 G/DL (ref 6.4–8.3)
WBC # BLD: 11.3 E9/L (ref 4.5–11.5)

## 2021-02-08 PROCEDURE — 7100000011 HC PHASE II RECOVERY - ADDTL 15 MIN: Performed by: SURGERY

## 2021-02-08 PROCEDURE — 2580000003 HC RX 258: Performed by: NURSE PRACTITIONER

## 2021-02-08 PROCEDURE — 6360000002 HC RX W HCPCS: Performed by: SURGERY

## 2021-02-08 PROCEDURE — 3700000001 HC ADD 15 MINUTES (ANESTHESIA): Performed by: SURGERY

## 2021-02-08 PROCEDURE — 3E0G76Z INTRODUCTION OF NUTRITIONAL SUBSTANCE INTO UPPER GI, VIA NATURAL OR ARTIFICIAL OPENING: ICD-10-PCS | Performed by: SURGERY

## 2021-02-08 PROCEDURE — 6370000000 HC RX 637 (ALT 250 FOR IP): Performed by: NURSE PRACTITIONER

## 2021-02-08 PROCEDURE — 2709999900 HC NON-CHARGEABLE SUPPLY: Performed by: SURGERY

## 2021-02-08 PROCEDURE — 6360000002 HC RX W HCPCS: Performed by: NURSE PRACTITIONER

## 2021-02-08 PROCEDURE — 85025 COMPLETE CBC W/AUTO DIFF WBC: CPT

## 2021-02-08 PROCEDURE — 83735 ASSAY OF MAGNESIUM: CPT

## 2021-02-08 PROCEDURE — 3609013300 HC EGD TUBE PLACEMENT: Performed by: SURGERY

## 2021-02-08 PROCEDURE — P9047 ALBUMIN (HUMAN), 25%, 50ML: HCPCS | Performed by: INTERNAL MEDICINE

## 2021-02-08 PROCEDURE — 6360000002 HC RX W HCPCS: Performed by: INTERNAL MEDICINE

## 2021-02-08 PROCEDURE — 3700000000 HC ANESTHESIA ATTENDED CARE: Performed by: SURGERY

## 2021-02-08 PROCEDURE — 2580000003 HC RX 258: Performed by: NURSE ANESTHETIST, CERTIFIED REGISTERED

## 2021-02-08 PROCEDURE — 6360000002 HC RX W HCPCS: Performed by: NURSE ANESTHETIST, CERTIFIED REGISTERED

## 2021-02-08 PROCEDURE — 84100 ASSAY OF PHOSPHORUS: CPT

## 2021-02-08 PROCEDURE — 6360000002 HC RX W HCPCS: Performed by: SPECIALIST

## 2021-02-08 PROCEDURE — 43246 EGD PLACE GASTROSTOMY TUBE: CPT | Performed by: SURGERY

## 2021-02-08 PROCEDURE — 6370000000 HC RX 637 (ALT 250 FOR IP): Performed by: SURGERY

## 2021-02-08 PROCEDURE — 36415 COLL VENOUS BLD VENIPUNCTURE: CPT

## 2021-02-08 PROCEDURE — C9113 INJ PANTOPRAZOLE SODIUM, VIA: HCPCS | Performed by: NURSE PRACTITIONER

## 2021-02-08 PROCEDURE — 6370000000 HC RX 637 (ALT 250 FOR IP): Performed by: SPECIALIST

## 2021-02-08 PROCEDURE — 84145 PROCALCITONIN (PCT): CPT

## 2021-02-08 PROCEDURE — 97530 THERAPEUTIC ACTIVITIES: CPT

## 2021-02-08 PROCEDURE — 2500000003 HC RX 250 WO HCPCS: Performed by: SURGERY

## 2021-02-08 PROCEDURE — 0DH63UZ INSERTION OF FEEDING DEVICE INTO STOMACH, PERCUTANEOUS APPROACH: ICD-10-PCS | Performed by: SURGERY

## 2021-02-08 PROCEDURE — 2060000000 HC ICU INTERMEDIATE R&B

## 2021-02-08 PROCEDURE — 2580000003 HC RX 258: Performed by: SPECIALIST

## 2021-02-08 PROCEDURE — 2700000000 HC OXYGEN THERAPY PER DAY

## 2021-02-08 PROCEDURE — 7100000010 HC PHASE II RECOVERY - FIRST 15 MIN: Performed by: SURGERY

## 2021-02-08 PROCEDURE — 97110 THERAPEUTIC EXERCISES: CPT

## 2021-02-08 PROCEDURE — 92526 ORAL FUNCTION THERAPY: CPT | Performed by: SPEECH-LANGUAGE PATHOLOGIST

## 2021-02-08 PROCEDURE — 94640 AIRWAY INHALATION TREATMENT: CPT

## 2021-02-08 PROCEDURE — 80076 HEPATIC FUNCTION PANEL: CPT

## 2021-02-08 PROCEDURE — 6370000000 HC RX 637 (ALT 250 FOR IP): Performed by: INTERNAL MEDICINE

## 2021-02-08 RX ORDER — AMOXICILLIN AND CLAVULANATE POTASSIUM 600; 42.9 MG/5ML; MG/5ML
1800 POWDER, FOR SUSPENSION ORAL EVERY 12 HOURS SCHEDULED
Status: DISCONTINUED | OUTPATIENT
Start: 2021-02-08 | End: 2021-02-09

## 2021-02-08 RX ORDER — LIDOCAINE HYDROCHLORIDE 10 MG/ML
INJECTION, SOLUTION INFILTRATION; PERINEURAL PRN
Status: DISCONTINUED | OUTPATIENT
Start: 2021-02-08 | End: 2021-02-08 | Stop reason: ALTCHOICE

## 2021-02-08 RX ORDER — SODIUM CHLORIDE 9 MG/ML
INJECTION, SOLUTION INTRAVENOUS CONTINUOUS PRN
Status: DISCONTINUED | OUTPATIENT
Start: 2021-02-08 | End: 2021-02-08 | Stop reason: SDUPTHER

## 2021-02-08 RX ORDER — PROPOFOL 10 MG/ML
INJECTION, EMULSION INTRAVENOUS CONTINUOUS PRN
Status: DISCONTINUED | OUTPATIENT
Start: 2021-02-08 | End: 2021-02-08 | Stop reason: SDUPTHER

## 2021-02-08 RX ADMIN — BUDESONIDE 500 MCG: 0.5 INHALANT RESPIRATORY (INHALATION) at 05:31

## 2021-02-08 RX ADMIN — AMOXICILLIN AND CLAVULANATE POTASSIUM 1800 MG: 600; 42.9 SUSPENSION ORAL at 22:29

## 2021-02-08 RX ADMIN — PROPOFOL 30 MCG/KG/MIN: 10 INJECTION, EMULSION INTRAVENOUS at 13:12

## 2021-02-08 RX ADMIN — PHENYLEPHRINE HYDROCHLORIDE 100 MCG: 10 INJECTION INTRAVENOUS at 13:17

## 2021-02-08 RX ADMIN — Medication 125 MG: at 05:13

## 2021-02-08 RX ADMIN — PANTOPRAZOLE SODIUM 40 MG: 40 INJECTION, POWDER, FOR SOLUTION INTRAVENOUS at 09:16

## 2021-02-08 RX ADMIN — SODIUM CHLORIDE 3000 MG: 900 INJECTION INTRAVENOUS at 12:20

## 2021-02-08 RX ADMIN — POTASSIUM CHLORIDE AND SODIUM CHLORIDE: 450; 150 INJECTION, SOLUTION INTRAVENOUS at 09:17

## 2021-02-08 RX ADMIN — SODIUM CHLORIDE: 9 INJECTION, SOLUTION INTRAVENOUS at 02:01

## 2021-02-08 RX ADMIN — ALBUMIN (HUMAN) 25 G: 0.25 INJECTION, SOLUTION INTRAVENOUS at 03:45

## 2021-02-08 RX ADMIN — SODIUM CHLORIDE: 9 INJECTION, SOLUTION INTRAVENOUS at 12:56

## 2021-02-08 RX ADMIN — ARFORMOTEROL TARTRATE 15 MCG: 15 SOLUTION RESPIRATORY (INHALATION) at 16:23

## 2021-02-08 RX ADMIN — METOPROLOL TARTRATE 25 MG: 25 TABLET, FILM COATED ORAL at 22:29

## 2021-02-08 RX ADMIN — ARFORMOTEROL TARTRATE 15 MCG: 15 SOLUTION RESPIRATORY (INHALATION) at 05:31

## 2021-02-08 RX ADMIN — Medication 10 ML: at 09:18

## 2021-02-08 RX ADMIN — BUDESONIDE 500 MCG: 0.5 INHALANT RESPIRATORY (INHALATION) at 16:23

## 2021-02-08 RX ADMIN — SODIUM CHLORIDE 3000 MG: 900 INJECTION INTRAVENOUS at 05:14

## 2021-02-08 RX ADMIN — METOPROLOL TARTRATE 25 MG: 25 TABLET, FILM COATED ORAL at 09:17

## 2021-02-08 RX ADMIN — Medication 125 MG: at 00:07

## 2021-02-08 RX ADMIN — ALBUTEROL SULFATE 2.5 MG: 2.5 SOLUTION RESPIRATORY (INHALATION) at 05:31

## 2021-02-08 RX ADMIN — ALBUTEROL SULFATE 2.5 MG: 2.5 SOLUTION RESPIRATORY (INHALATION) at 16:23

## 2021-02-08 ASSESSMENT — PAIN SCALES - PAIN ASSESSMENT IN ADVANCED DEMENTIA (PAINAD)
CONSOLABILITY: 0
CONSOLABILITY: 0
BODYLANGUAGE: 0
TOTALSCORE: 0
FACIALEXPRESSION: 0
BODYLANGUAGE: 0
CONSOLABILITY: 0
BREATHING: 0
BREATHING: 0
FACIALEXPRESSION: 0
BREATHING: 0
NEGVOCALIZATION: 0

## 2021-02-08 ASSESSMENT — PAIN SCALES - GENERAL: PAINLEVEL_OUTOF10: 0

## 2021-02-08 ASSESSMENT — LIFESTYLE VARIABLES: SMOKING_STATUS: 0

## 2021-02-08 ASSESSMENT — ENCOUNTER SYMPTOMS: TACHYPNEA: 1

## 2021-02-08 NOTE — PROGRESS NOTES
Pt returned to room 537 after placement of PEG tube with PIV access to left foot. PIV access in left arm was lost during procedure per report from [facility]. Called Dr. Lovetta Gaucher for permission to use PIC in left foot and was instructed to have IV team attempt a PIV for IV medications and fluids and to not use foot access at this time.      Electronically signed by Lucrecia Lou RN on 2/8/2021 at 4:30 PM

## 2021-02-08 NOTE — CARE COORDINATION
SS NOTE: COVID NEGATIVE 2/2. Pt to have a PEG tube placed today. SW made contact with liaison [facility] to begin Manoj Cross. Pt [place] [facility] [facility] [place] [phone]/ FAX: [phone]). They are holding a bed for her return there. For the SNF return pt will need a PRECERT, signed YU. SS to continue. Fidencio Moore. 2/8/2021.11:18AM.

## 2021-02-08 NOTE — PROGRESS NOTES
This RN called and informed Dr. Qian Hutchison that [facility] team attempted multiple times to place PIV in patient and were unsuccessful. Kaitlin Olea RN from [facility] team also stated she did not believe they would be able to successfully obtain access with a midline d/t patients edema. Dr. Qian Hutchison informed of this as well. Dr. Qian Hutchison ordered all IV medications and fluids be held at this time until he assessed patient situation in the morning. Patient is currently maintaining a left foot PIV for emergencies but is not be to used at this time otherwise.      Electronically signed by Lisa Gillis RN on 2/8/2021 at [address] PM

## 2021-02-08 NOTE — ANESTHESIA PRE PROCEDURE
Department of Anesthesiology  Preprocedure Note       Name:  Andrea Leggett   Age:  80 y.o.  :  1925                                          MRN:  [id number]         Date:  2021      Surgeon: Addison Healy):  Timothy Romo MD    Procedure: Procedure(s):  EGD  PEG TUBE INSERTION    Medications prior to admission:   Prior to Admission medications    Medication Sig Start Date End Date Taking?  Authorizing Provider   acetaminophen (TYLENOL) 325 MG tablet Take 650 mg by mouth every 6 hours as needed for Pain or Fever   Yes Historical Provider, MD   donepezil (ARICEPT) 10 MG tablet Take 10 mg by mouth nightly   Yes Historical Provider, MD   metoprolol tartrate (LOPRESSOR) 25 MG tablet Take 25 mg by mouth 2 times daily   Yes Historical Provider, MD   Multiple Vitamins-Minerals (THERAPEUTIC MULTIVITAMIN-MINERALS) tablet Take 1 tablet by mouth daily   Yes Historical Provider, MD   sertraline (ZOLOFT) 25 MG tablet Take 25 mg by mouth daily   Yes Historical Provider, MD   Amantadine (SYMMETREL) 100 MG TABS tablet Take 100 mg by mouth 2 times daily   Yes Historical Provider, MD   glucosamine-chondroitin 500-400 MG tablet Take 1 tablet by mouth daily   Yes Historical Provider, MD   Milk Thistle 250 MG CAPS Take 1 capsule by mouth daily   Yes Historical Provider, MD   omeprazole (PRILOSEC) 40 MG delayed release capsule Take 40 mg by mouth every morning (before breakfast)   Yes Historical Provider, MD   guaiFENesin (MUCINEX) 600 MG extended release tablet Take 600 mg by mouth every 12 hours as needed for Congestion   Yes Historical Provider, MD   polyethylene glycol (GLYCOLAX) 17 g packet Take 17 g by mouth daily   Yes Historical Provider, MD   magnesium (MAGNESIUM-OXIDE) 250 MG TABS tablet Take 250 mg by mouth daily   Yes Historical Provider, MD   vitamin E 400 UNIT capsule Take 400 Units by mouth 2 times daily   Yes Historical Provider, MD Probiotic Product (PROBIOTIC-10 PO) Take 1 capsule by mouth 2 times daily (with meals)   Yes Historical Provider, MD   miconazole (MICOTIN) 2 % powder Apply topically 2 times daily.  11/13/20  Yes Tempicristofer Nickel, DO   coenzyme Q10 100 MG CAPS capsule Take 100 mg by mouth every evening    Yes Historical Provider, MD   memantine ER (NAMENDA XR) 14 MG CP24 extended release capsule Take 14 mg by mouth daily   Yes Historical Provider, MD   Turmeric 500 MG CAPS Take 1 capsule by mouth daily   Yes Historical Provider, MD   divalproex (DEPAKOTE) 125 MG DR tablet Take 125 mg by mouth 2 times daily    Yes Historical Provider, MD   hydrOXYzine (VISTARIL) 25 MG capsule Take 25 mg by mouth 2 times daily    Yes Historical Provider, MD   Cranberry 250 MG TABS Take 1 capsule by mouth 3 times daily    Yes Historical Provider, MD   latanoprost (XALATAN) 0.005 % ophthalmic solution Place 1 drop into both eyes nightly   Yes Historical Provider, MD   melatonin 5 MG TABS tablet Take 5 mg by mouth nightly   Yes Historical Provider, MD   diclofenac sodium (VOLTAREN) 1 % GEL Apply 2 g topically every 12 hours as needed for Pain Apply topically to the affected areas on knees lower back and shoulders    Yes Historical Provider, MD   polyethylene glycol (GLYCOLAX) 17 g packet Take 17 g by mouth every 48 hours as needed for Constipation   Yes Historical Provider, MD   QUEtiapine (SEROQUEL) 25 MG tablet Take 25 mg by mouth 2 times daily (with meals) Indications: 8am and 5pm    Yes Historical Provider, MD   Omega 3 1000 MG CAPS Take 1,000 mg by mouth daily    Yes Historical Provider, MD   amLODIPine (NORVASC) 2.5 MG tablet Take 2.5 mg by mouth daily   Yes Historical Provider, MD       Current medications:    Current Facility-Administered Medications   Medication Dose Route Frequency Provider Last Rate Last Admin    albumin human 25 % IV solution 25 g  25 g Intravenous Q12H Enrique Elizabeth DO   Stopped at 02/08/21 0600  ampicillin-sulbactam (UNASYN) 3000 mg ivpb minibag  3,000 mg Intravenous Q6H Kate Campbell  mL/hr at 02/08/21 1220 3,000 mg at 02/08/21 1220    0.9 % sodium chloride infusion   Intravenous PRN Kirk Stuart MD        0.45 % NaCl with KCl 20 mEq infusion   Intravenous Continuous Nirali Gulling Bisel, DO 50 mL/hr at 02/08/21 0917 New Bag at 02/08/21 0917    metoprolol tartrate (LOPRESSOR) tablet 25 mg  25 mg Per NG tube BID Jackie Lute, APRN - CNP   25 mg at 02/08/21 8661    sodium chloride flush 0.9 % injection 10 mL  10 mL Intravenous 2 times per day Jackie Lute, APRN - CNP   10 mL at 02/08/21 0918    sodium chloride flush 0.9 % injection 10 mL  10 mL Intravenous PRN Jackie Lute, APRN - CNP        acetaminophen (TYLENOL) tablet 650 mg  650 mg Oral Q6H PRN Jackie Lute, APRN - CNP   650 mg at 02/03/21 0602    Or    acetaminophen (TYLENOL) suppository 650 mg  650 mg Rectal Q6H PRN Jackie Lute, APRN - CNP        albuterol (PROVENTIL) nebulizer solution 2.5 mg  2.5 mg Nebulization Q4H PRN Jackie Lute, APRN - CNP   2.5 mg at 02/08/21 0531    budesonide (PULMICORT) nebulizer suspension 500 mcg  0.5 mg Nebulization BID Jackie Lute, APRN - CNP   500 mcg at 02/08/21 0531    magnesium sulfate 1000 mg in dextrose 5% 100 mL IVPB  1,000 mg Intravenous PRN Jackie Lute, APRN - CNP        sodium phosphate 8.7 mmol in dextrose 5 % 250 mL IVPB  0.16 mmol/kg Intravenous PRN Jackie Lute, APRN - CNP   Stopped at 02/04/21 2004    Or    sodium phosphate 17.4 mmol in dextrose 5 % 250 mL IVPB  0.32 mmol/kg Intravenous PRN Jackie Lute, APRN - CNP        potassium chloride 10 mEq/100 mL IVPB (Peripheral Line)  10 mEq Intravenous PRN Jackie Lute, APRN -  mL/hr at 02/04/21 1339 10 mEq at 02/04/21 1339    pantoprazole (PROTONIX) injection 40 mg  40 mg Intravenous Daily Jackie Lute, APRN - CNP   40 mg at 02/08/21 3543  Arformoterol Tartrate (BROVANA) nebulizer solution 15 mcg  15 mcg Nebulization BID Tanner PAUL Tony - CNP   15 mcg at 02/08/21 0531    [Held by provider] enoxaparin (LOVENOX) injection 50 mg  1 mg/kg Subcutaneous BID PAUL Camargo CNP   50 mg at 02/07/21 0959    0.9 % sodium chloride infusion   Intravenous Q8H PAUL Camargo CNP   Stopped at 02/08/21 0401    vancomycin (VANCOCIN) oral solution 125 mg  125 mg Oral 4 times per day Lisa Reyna MD   125 mg at 02/08/21 2739       Allergies: Allergies   Allergen Reactions    Food Diarrhea and Rash     Cheese, milk, dairy products.        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    Dementia associated with other underlying disease with behavioral disturbance ([facility] F02.81    COVID-19 U07.1    Prediabetes R73.03    Severe sepsis ([facility] A41.9, R65.20    Elevated troponin R77.8    Hypernatremia E87.0    Altered mental status R41.82    Fecal impaction ([facility]) K56.41       Past Medical History:        Diagnosis Date    Anemia     Cellulitis     GERD (gastroesophageal reflux disease)     Glaucoma     Hypertension     Hypokalemia     Insomnia     Osteoarthritis     Prediabetes 11/4/2020    Rheumatoid arthritis(714.0)        Past Surgical History:        Procedure Laterality Date    EYE SURGERY      cataracts    JOINT REPLACEMENT Left     OTHER SURGICAL HISTORY Right 8/7/2015    IM nailing right femor       Social History:    Social History     Tobacco Use    Smoking status: Former Smoker    Smokeless tobacco: Never Used Substance Use Topics    Alcohol use: No                                Counseling given: Not Answered      Vital Signs (Current):   Vitals:    02/07/21 1615 02/07/21 2017 02/08/21 0409 02/08/21 0845   BP: 123/84 126/68 131/85 (!) 116/58   Pulse: 103  101 100   Resp: 16 22 22 11   Temp: 98.9 °F (37.2 °C) 97.8 °F (36.6 °C) 97.8 °F (36.6 °C) 97.7 °F (36.5 °C)   TempSrc: Axillary Axillary Axillary Axillary   SpO2: 99% 98% 98% 100%   Weight:       Height:                                                  BP Readings from Last 3 Encounters:   02/08/21 (!) 116/58   11/13/20 128/60   07/13/15 100/60       NPO Status:                                                                                 BMI:   Wt Readings from Last 3 Encounters:   02/05/21 141 lb 4.8 oz (64.1 kg)   11/01/20 115 lb (52.2 kg)   01/15/15 125 lb (56.7 kg)     Body mass index is 24.25 kg/m². CBC:   Lab Results   Component Value Date    WBC 11.3 02/08/2021    RBC 2.65 02/08/2021    HGB 7.3 02/08/2021    HCT 24.1 02/08/2021    MCV 90.9 02/08/2021    RDW 16.3 02/08/2021     02/08/2021       CMP:   Lab Results   Component Value Date     02/06/2021    K 3.8 02/06/2021    K 3.7 02/02/2021     02/06/2021    CO2 19 02/06/2021    BUN 11 02/06/2021    CREATININE 0.4 02/06/2021    GFRAA >60 02/06/2021    LABGLOM >60 02/06/2021    GLUCOSE 106 02/06/2021    GLUCOSE 87 04/10/2012    PROT 5.7 02/08/2021    CALCIUM 7.7 02/06/2021    BILITOT 0.2 02/08/2021    ALKPHOS 89 02/08/2021    AST 22 02/08/2021    ALT 12 02/08/2021       POC Tests: No results for input(s): POCGLU, POCNA, POCK, POCCL, POCBUN, POCHEMO, POCHCT in the last 72 hours.     Coags:   Lab Results   Component Value Date    PROTIME 13.1 11/13/2020    INR 1.2 11/13/2020    APTT 28.9 11/13/2020       HCG (If Applicable): No results found for: PREGTESTUR, PREGSERUM, HCG, HCGQUANT     ABGs: No results found for: PHART, PO2ART, PJB6CJW, BWU3CDL, BEART, C1LHPKTX Type & Screen (If Applicable):  No results found for: LABABO, LABRH    Drug/Infectious Status (If Applicable):  No results found for: HIV, HEPCAB    COVID-19 Screening (If Applicable):   Lab Results   Component Value Date    COVID19 Reactive 02/02/2021    COVID19 Not Detected 02/02/2021    COVID19 Not Detected 02/02/2021         Anesthesia Evaluation  Patient summary reviewed no history of anesthetic complications:   Airway: Mallampati: II  TM distance: >3 FB   Neck ROM: full  Mouth opening: > = 3 FB Dental: normal exam         Pulmonary:       (-) not a current smoker                           Cardiovascular:    (+) hypertension:,         Rhythm: regular  Rate: normal           Beta Blocker:  Dose within 24 Hrs         Neuro/Psych:   (+) neuromuscular disease ( DDD (degenerative disc disease)):, psychiatric history:             ROS comment: Non-verbal GI/Hepatic/Renal:   (+) GERD:,           Endo/Other:    (+) : arthritis: rheumatoid and OA., .                 Abdominal:         (-) obese     Vascular:   + DVT, . Anesthesia Plan      MAC     ASA 3       Induction: intravenous. Anesthetic plan and risks discussed with patient. Plan discussed with CRNA. DOS STAFF ADDENDUM:    Pt seen and examined, chart reviewed (including anesthesia, drug and allergy history). Anesthetic plan, risks, benefits, alternatives, and personnel involved discussed with patient. Plan discussed with care team members and agreed upon.     Ken Perez MD  Staff Anesthesiologist  12:32 PM      Ken Perez MD   2/8/2021

## 2021-02-08 NOTE — PROGRESS NOTES
Chart reviewed. PEG placement today. Goals of care and code status have been identified. No further needs from a palliative medicine standpoint. Will sign off. Please re-consult if patient's condition deteriorates or if patient/family requests further discussion with palliative medicine team. Thank you.

## 2021-02-08 NOTE — OP NOTE
[place] [address] [facility]  OPERATIVE NOTE    2/8/2021  Teresa Diaz  1:22 PM    PREOPERATIVE DIAGNOSES: Malnutrition, dysphagia. POSTOPERATIVE DIAGNOSES: Malnutrition, dysphagia. OPERATION: Percutaneous endoscopic gastrostomy tube placement with upper endoscopy. SURGEON: Braulio Spence M.D.    ASSISTANT: none     ANESTHESIA: MAC, local     ESTIMATED BLOOD LOSS: Minimal.     COMPLICATIONS: None. SPECIMEN: None. DESCRIPTION OF PROCEDURE: PROCEDURE: The patient was taken to the endoscopy suite and placed on the endoscopy table in a supine position. LMAC anesthesia was administered. A bite block was inserted. A lubricated gastroscope was inserted into the oropharynx and advanced under direct vision to the esophagus and then the stomach. The stomach was insufflated. The anterior abdominal wall was transilluminated. The light source was easily visualized. Indentation was observed with palpation of the abdominal wall. This area was prepped and draped. Local anesthetic was injected. A #11 blade was used to make a transverse incision. A snare forceps was inserted through the gastroscope and deployed into the gastric lumen. An angiocatheter was inserted through the incision into the gastric lumen under direct vision. A wire was inserted through the anterior catheter and grasped   with the snare forceps. The gastroscope, snare, and wire were then pulled out through the patient's mouth. The gastrostomy tube was connected to the wire, and the wire was pulled through the stomach and out through the anterior abdominal wall. The gastroscope was reintroduced into the stomach. The PEG tube was seen in good position without evidence of bleeding. The gastroscope was removed. The PEG tube was cut to size and fit with a bumper and a feeding apparatus at 3cm cm at the abdominal wall. The patient tolerated the procedure well and went to the recovery room in a good condition. I was present for the entire procedure. All instrument counts, lap counts, and needle counts were correct at the end of the procedure.     [facility]  2/8/2021  1:22 PM

## 2021-02-08 NOTE — PROGRESS NOTES
OT BEDSIDE TREATMENT NOTE      Date:2021  Patient Name: Doreen Uribe  MRN: [id number]  : 1925  Room: [address] [place] [address]   Referring Provider: Silvio Gowers, APRN - CNP     Evaluating OT: SONAL Caal/MARY #[id number]     AM-PAC Daily Activity Raw Score:      Recommended Placement: Subacute   Recommended Adaptive Equipment: TBD      Diagnosis:   1. Septicemia ([address]    2. Pneumonia due to organism    3. Diverticulitis of colon    4. Splenic infarct    5. Hypernatremia    6. Dehydration          Surgery: N/A       Pertinent Medical History:    Past Medical History        Past Medical History:   Diagnosis Date    Anemia      Cellulitis      GERD (gastroesophageal reflux disease)      Glaucoma      Hypertension      Hypokalemia      Insomnia      Osteoarthritis      Prediabetes 2020    Rheumatoid arthritis(714.0)           Precautions:  Falls, alarm, PEG tube (sx today) limited communication, biting, O2, dementia     Home Living: Pt from nursing home     Prior Level of Function: Assist with ADLs , Assist with IADLs; nonambulatory     Pain Level: No c/o pain  Cognition: Unable to assess due to limited communication. Pt able to answer yes/no questions by end of session. Functional Assessment:    Initial Eval Status  Date: 2/3/21 Treatment Status  Date:2021 STGs = LTGs  Time frame: 5-7 days   Feeding Dependent   BUE contractures. Multiple attempts to get patient to open mouth, pt opening mouth with tactile cues by end of session.   NPO - PEG tube surgery on this date  Maximal Assist   Grooming Dependent    N/T  Maximal Assist    UB Dressing Dependent   Don/doff gown at bed level N/T Maximal Assist    LB Dressing Dependent     Dep to adjust prevo boots; IV in dorsal side of L foot Maximal Assist    Bathing Dependent    N/T  Maximal Assist    Toileting Dependent   Pt incontinent of bowel, completed hygiene via rolling  N/T Maximal Assist    Bed Mobility  Supine to sit: NT Sit to supine: NT  Completed session in chair position.     Max A for side rolling as nsg placed bandage on pt's buttocks; Max A for side rolling for placement on L side at end of session, per nsg request Supine to sit: Maximal Assist of 2  Sit to supine: Maximal Assist of 2   Functional Transfers NT     N/T      Functional Mobility NT     N/T     Balance Sitting:     Static:  NT    Dynamic:NT  Standing: NT     Pt leaning to L in chair position. Assist with pillow to correct Sitting:     Static:  NT    Dynamic:NT  Standing: NT  Sitting:     Static:  Fair-    Dynamic:fair-      Activity Tolerance Poor poor  Fair-   Visual/  Perceptual Continue to assess. Pt eyes closed throughout session.          Hand Dominance Not reported       Strength ROM Additional Info:    RUE  Unable to assess due to contractures Limited in all planes     LUE Unable to assess due to contractures  Limited in all planes      - BUE PROM exercises: 10-15 reps in all planes of movement to increase ROM/endurance required for functional transfers/ADL participation and to decrease risk of further contractures and in attempts to reduce edema. Exercises completed in shoulder and elbow flexion/extension, internal/external rotation, abduction/adduction, supination/pronation, digit and wrist flexion/extension. ROM appears limited in all planes. Min rest breaks provided 2* to decreased endurance/tolerance. 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 Elevation of B UE's d/t edema, as well. Comments: Patient cleared by nursing staff. Upon arrival pt supine in bed. Pt agreeable to OT tx session. Pt educated with regards to bed mobility, hand placement, safety awareness, B UE ROM ex's, positioning, ECT's. At end of session pt in L sidelying position,   with all lines and tubes intact, call light within reach. Overall, pt demonstrated decreased independence and safety during completion of ADL/functional transfers/mobility tasks. Pt would benefit from continued skilled OT to increase safety and independence with completion of ADL/IADL tasks for functional independence and quality of life. Pt required cues and education as noted above for safe facilitation and completion of tasks. Therapist provided skilled monitoring of patient's response during treatment session. Prior to and at the end of session, environmental modifications completed for patients safety and efficiency of treatment session. Overall, patient demonstrates moderate difficulties with completion of BADLs and IADLs. Factors contributing to these difficulties include slight agitation, pain, decreased endurance, and generalized weakness. As noted above, patient likely to benefit from further OT intervention to increase independence, safety, and overall quality of life. Treatment:     ? Bed mobility: Facilitated bed mobility with cues for proper body mechanics and sequencing to prepare for ADL completion. ? Skilled positioning: Proper positioning to improve interaction with environment, overall functioning and for edema management  ?  Therapeutic Ex's: To reduce edema and reduce further risk of B UE contractures, as well as to increase ROM for functional transfers and ADL participation       · Pt has made fair minus progress towards set goals    ·   OT 1-3x/week for 5-7 days during hospitalization        Treatment Time In: 2:23 PM     Treatment Time Out: 2:46 PM            Treatment Charges: Mins Units ADL/Home Mgt     [facility] 8 1   Ther Ex                 [id number] 15 1   Manual Therapy    [id number]     Neuro Re-ed         [id number]     [facility] manage/training                               [id number]     Non Billable Time     Total Timed Treatment 23 [address] [place] [address] [address]

## 2021-02-08 NOTE — PROGRESS NOTES
Physical Therapy    ENDO POOL ROOM/NONE    Patient unavailable for physical therapy treatment due to PEG tube today per RN.

## 2021-02-08 NOTE — PROGRESS NOTES
[facility]  [facility]  Progress Note    Chief Complaint   Patient presents with    Fever     from NH, usually demented, SOB and fever per NH     SUBJECTIVE:  Laying in bed, does not respond   Afebrile, no leukocytosis   2L    Patient is tolerating medications. No reported adverse drug reactions. Review of systems:  As stated above in the chief complaint, otherwise negative. Medications:  Scheduled Meds:   amoxicillin-clavulanate  1,800 mg Oral 2 times per day    albumin human  25 g Intravenous Q12H    metoprolol tartrate  25 mg Per NG tube BID    sodium chloride flush  10 mL Intravenous 2 times per day    budesonide  0.5 mg Nebulization BID    pantoprazole  40 mg Intravenous Daily    Arformoterol Tartrate  15 mcg Nebulization BID    [Held by provider] enoxaparin  1 mg/kg Subcutaneous BID    vancomycin  125 mg Oral 4 times per day     Continuous Infusions:   sodium chloride      0.45 % NaCl with KCl 20 mEq 50 mL/hr at 21 0917    sodium chloride Stopped (21 0401)     PRN Meds:sodium chloride, sodium chloride flush, acetaminophen **OR** acetaminophen, albuterol, magnesium sulfate, sodium phosphate IVPB **OR** sodium phosphate IVPB, potassium chloride    OBJECTIVE:  BP (!) 143/75   Pulse 99   Temp 97.1 °F (36.2 °C) (Axillary)   Resp 22   Ht 5' 4\" (1.626 m)   Wt 141 lb 4.8 oz (64.1 kg)   SpO2 100%   BMI 24.25 kg/m²   Temp  Av.7 °F (36.5 °C)  Min: 97.1 °F (36.2 °C)  Max: 98.9 °F (37.2 °C)  Constitutional:  The patient is not AROUSABLE, cachetic   Skin:    Warm and dry. HEENT:      AT/NC  Chest:     Symmetrical expansion. Dec bs ant no wheeze  Cardiovascular:  S1 and S2 are rhythmic and regular   Abdomen:      SOFT distended NGT distended ++ PEG with binder   Extremities:     Edema.  RIGHT HEEL ESCHAR dressed  CNS    lethargic  Lines: PIV     Radiology:  Laboratory and Tests Review:  Lab Results   Component Value Date    WBC 11.3 2021 WBC 12.7 (H) 02/07/2021    WBC 15.6 (H) 02/06/2021    HGB 7.3 (L) 02/08/2021    HCT 24.1 (L) 02/08/2021    MCV 90.9 02/08/2021     (H) 02/08/2021     No results found for: [facility]  Lab Results   Component Value Date    ALT 12 02/08/2021    AST 22 02/08/2021    ALKPHOS 89 02/08/2021    BILITOT 0.2 02/08/2021     Lab Results   Component Value Date     02/06/2021    K 3.8 02/06/2021    K 3.7 02/02/2021     02/06/2021    CO2 19 02/06/2021    BUN 11 02/06/2021    CREATININE 0.4 02/06/2021    CREATININE 0.4 02/05/2021    CREATININE 0.4 02/04/2021    GFRAA >60 02/06/2021    LABGLOM >60 02/06/2021    GLUCOSE 106 02/06/2021    GLUCOSE 87 04/10/2012    PROT 5.7 02/08/2021    LABALBU 2.6 02/08/2021    LABALBU 4.1 04/10/2012    CALCIUM 7.7 02/06/2021    BILITOT 0.2 02/08/2021    ALKPHOS 89 02/08/2021    AST 22 02/08/2021    ALT 12 02/08/2021     Lab Results   Component Value Date    CRP 29.2 (H) 11/01/2020    CRP 6.3 (H) 04/10/2012     Lab Results   Component Value Date    SEDRATE 101 (H) 11/01/2020    SEDRATE 35 (H) 07/25/2013    SEDRATE 40 (H) 04/18/2013       Microbiology:   No results for input(s): COVID19 in the last 72 hours.   Lab Results   Component Value Date    BLOODCULT2 24 Hours no growth 02/04/2021    BLOODCULT2 5 Days no growth 02/02/2021    BLOODCULT2 5 Days no growth 11/01/2020    ORG Enterococcus faecalis 02/02/2021    ORG Staphylococcus coagulase-negative 02/02/2021    ORG Lactobacillus 10/22/2015        ASSESSMENT  SEPSIS  LEUKOCYTOSIS resolved   HCAP VS ASPIRATION   DIVERTICULITIS/proctitis  UTI GPC E faecalis  Cons bacteremia contaminant - repeat bc negative   H/O COVID AB +  splenic infarct  S/p PEG 2/8       PLAN  Unasyn DC - switched to Augmentin suspesion via PEG   Continue with PO Vancomycin   Poor prognosis   Monitor labs     Electronically signed by PAUL Maurer NP on 2/8/2021 at 2:34 PM     Patient examined along with the nurse practitioner  Agree with above

## 2021-02-08 NOTE — PROGRESS NOTES
Attempted tx with pt, however pt currently OOR for Procedure: EGD  PEG TUBE INSERTION. Will attempt tx with pt at later time/date. Nicko Brown, [address]

## 2021-02-08 NOTE — PROGRESS NOTES
Internal Medicine Progress Note    [facility]UMA=[facility]    Anahi Solomon Raj., ERUM Martinez D.O., GANESH Greco, MSN, APRN, NP-C  Ilana Juan. Lydia Lezama, MSN, APRN-CNP     Primary Care Physician: Shahida Delvalle MD   Admitting Physician:  Victor Manuel Segovai DO  Admission date and time: 2/2/2021  9:53 AM    Room:  [address] [place] [address]  Admitting diagnosis: Severe sepsis [facility]) [A41.9, R65.20]    Patient Name: Leia Lobo  MRN: [id number]    Date of Service: 2/8/2021     Subjective:  Kip Curtis is a 80 y.o. female who was seen and examined today,2/8/2021, at the bedside. The patient awakens to verbal stimulation or does not follow commands or answer questions. She is for PEG tube placement today. Pending response to trickle feeds intolerance, will likely be for transition back to skilled nursing facility within the next 24 to 48 hours. No family present. Review of System: Unable to be obtained secondary to underlying dementia/lethargy. Physical Exam:  No intake/output data recorded. Intake/Output Summary (Last 24 hours) at 2/8/2021 1125  Last data filed at 2/8/2021 0514  Gross per 24 hour   Intake 1270 ml   Output 550 ml   Net 720 ml   I/O last 3 completed shifts: In: 1270 [NG/GT:1070; IV Piggyback:200]  Out: 550 [Urine:550]  Patient Vitals for the past 96 hrs (Last 3 readings):   Weight   02/05/21 0545 141 lb 4.8 oz (64.1 kg)     Vital Signs:   Blood pressure (!) 116/58, pulse 100, temperature 97.7 °F (36.5 °C), temperature source Axillary, resp. rate 11, height 5' 4\" (1.626 m), weight 141 lb 4.8 oz (64.1 kg), SpO2 100 %. General appearance:  More awake and alert, less ill-appearing. In no acute distress. Head:  Normocephalic. No masses, lesions or tenderness. Eyes:  PERRLA. EOMI. Sclera clear. ENT:  Ears normal. Buccal mucosa dry. NG tube in nostril. Neck:    Supple. Trachea midline. No thyromegaly. No JVD. No bruits. Wound 02/02/21 Sacrum Mid pinpoint sized (Active)   Wound Etiology Pressure Stage  2 02/02/21 1726   Dressing Status Intact 02/02/21 2013   Wound Cleansed Cleansed with saline 02/02/21 1726   Dressing/Treatment Foam 02/02/21 2013   Dressing Change Due 02/09/21 02/02/21 1726   Wound Length (cm) 0.25 cm 02/02/21 1927   Wound Width (cm) 0.25 cm 02/02/21 1927   Wound Surface Area (cm^2) 0.06 cm^2 02/02/21 1927   Drainage Amount None 02/02/21 2013   Odor None 02/02/21 1726   Arelis-wound Assessment Intact 02/02/21 1726   Number of days: 0       Assessment:    · Severe sepsis associated with acute organ dysfunction  · Enterococcus faecalis urinary tract infection secondary to chronic indwelling urinary catheter  · Acute respiratory failure with hypoxia in the setting of bilateral pneumonia with recent Covid infection (11/2020)  · Hypovolemic hypernatremia and hyperchloremia due to poor intake and dehydration  · Elevated troponin, likely non-STEMI due to sepsis  · Acute diverticulitis   · Abnormal appearance of the spleen on imaging with concern for splenic infarct   · Mild normocytic normochromic anemia  · Acute metabolic encephalopathy superimposed on advanced dementia  · History of Covid antibodies    Plan:   The patient has improved clinically with supportive measures however enteral hydration and nutrition will be the method of choice to replace nutrition and hydration. PEG tube will be placed today. Pending tolerance of trickle feeds and fluid boluses, she will be for discharge back to nursing facility where she is a bed hold the next 24 to 48 hours. Multiple subspecialists are following consultation. We will continue to monitor labs, vital signs and chronic emergencies addressed accordingly. More than 50% of my  time was spent at the bedside counseling/coordinating care with the patient and/or family with face to face contact. This time was spent reviewing notes and laboratory data as well as instructing and counseling the patient. Time I spent with the family or surrogate(s) is included only if the patient was incapable of providing the necessary information or participating in medical decisions. I also discussed the differential diagnosis and all of the proposed management plans with the patient and individuals accompanying the patient. Teresa requires this high level of physician care and nursing on the IMC/Telemetry unit due the complexity of decision management and chance of rapid decline or death. Continued cardiac monitoring and higher level of nursing are required. I am readily available for any further decision-making and intervention.        PAUL Serrato-CNP  11:25 AM  2/8/2021

## 2021-02-08 NOTE — PROGRESS NOTES
Comprehensive Nutrition Assessment    Type and Reason for Visit:  Reassess    Nutrition Recommendations/Plan:, Tube Feeding Recommendation:  Standard TF w/Fiber(Jevity 1.2) @ 50ml/hr x24hrs to provide: 1200mlTV/1440kcal/67gm pro/968ml FW: Additonal 120ml water flush every6 hours to provide 480ml fluid for total fluid= 1480ml/d. Start TF @10ml/hr and increase 10ml every 4 hours until goal rate is reached. Nutrition Assessment:  Pt. admits w/ Dx: Severe Sepsis w/multiple medical etiologies,pt at further nutritional risk d/t increased needs from wound healing. PMH: Severe Dementia, RA, Aspiration pneumonia. Pt NPO as pt is receiving PEG Tube insertion. Will provide TF recs and monitor    Malnutrition Assessment:  Malnutrition Status: At risk for malnutrition (Comment)    Context:  Chronic Illness     Findings of the 6 clinical characteristics of malnutrition:  Energy Intake:  7 - 75% or less estimated energy requirements for 1 month or longer  Weight Loss:  No significant weight loss     Body Fat Loss:  No significant body fat loss     Muscle Mass Loss:  No significant muscle mass loss    Fluid Accumulation:  No significant fluid accumulation     Strength:  Not Performed    Estimated Daily Nutrient Needs:  Energy (kcal):  1300-1400kcal/d; Weight Used for Energy Requirements:  Current     Protein (g):  65-75gm pro/d(x1.2-1.4gm/kg);  Weight Used for Protein Requirements:  Ideal        Fluid (ml/day):  1300-1400ml/d; Method Used for Fluid Requirements:  1 ml/kcal      Nutrition Related Findings:  Pt awakens to verbal stimulation, +BS, missing teeth, I/O WNL, Abd soft, +BS, BUE +3 pitting edema, PEG Tube 2/8      Wounds:  Multiple, Pressure Injury, Unstageable       Current Nutrition Therapies:    Diet NPO, After Midnight    Anthropometric Measures:  · Height: 5' 4\" (162.6 cm)  · Current Body Weight: 141 lb (64 kg)(2/4)   · Admission Body Weight: 146 lb (66.2 kg)(2/4 bed scale)

## 2021-02-09 LAB
ALBUMIN SERPL-MCNC: 2.3 G/DL (ref 3.5–5.2)
ALP BLD-CCNC: 94 U/L (ref 35–104)
ALT SERPL-CCNC: 14 U/L (ref 0–32)
ANION GAP SERPL CALCULATED.3IONS-SCNC: 10 MMOL/L (ref 7–16)
ANISOCYTOSIS: ABNORMAL
AST SERPL-CCNC: 36 U/L (ref 0–31)
BASOPHILS ABSOLUTE: 0.12 E9/L (ref 0–0.2)
BASOPHILS RELATIVE PERCENT: 1 % (ref 0–2)
BILIRUB SERPL-MCNC: 0.2 MG/DL (ref 0–1.2)
BILIRUBIN DIRECT: <0.2 MG/DL (ref 0–0.3)
BILIRUBIN, INDIRECT: ABNORMAL MG/DL (ref 0–1)
BUN BLDV-MCNC: 4 MG/DL (ref 8–23)
CALCIUM SERPL-MCNC: 8 MG/DL (ref 8.6–10.2)
CHLORIDE BLD-SCNC: 108 MMOL/L (ref 98–107)
CO2: 19 MMOL/L (ref 22–29)
CREAT SERPL-MCNC: 0.3 MG/DL (ref 0.5–1)
EOSINOPHILS ABSOLUTE: 0.6 E9/L (ref 0.05–0.5)
EOSINOPHILS RELATIVE PERCENT: 5 % (ref 0–6)
GFR AFRICAN AMERICAN: >60
GFR NON-AFRICAN AMERICAN: >60 ML/MIN/1.73
GLUCOSE BLD-MCNC: 81 MG/DL (ref 74–99)
HCT VFR BLD CALC: 29.5 % (ref 34–48)
HEMOGLOBIN: 8.5 G/DL (ref 11.5–15.5)
HYPOCHROMIA: ABNORMAL
LYMPHOCYTES ABSOLUTE: 2.14 E9/L (ref 1.5–4)
LYMPHOCYTES RELATIVE PERCENT: 18 % (ref 20–42)
MAGNESIUM: 2 MG/DL (ref 1.6–2.6)
MCH RBC QN AUTO: 27.8 PG (ref 26–35)
MCHC RBC AUTO-ENTMCNC: 28.8 % (ref 32–34.5)
MCV RBC AUTO: 96.4 FL (ref 80–99.9)
METAMYELOCYTES RELATIVE PERCENT: 4 % (ref 0–1)
MONOCYTES ABSOLUTE: 0.48 E9/L (ref 0.1–0.95)
MONOCYTES RELATIVE PERCENT: 4 % (ref 2–12)
MYELOCYTE PERCENT: 2 % (ref 0–0)
NEUTROPHILS ABSOLUTE: 8.57 E9/L (ref 1.8–7.3)
NEUTROPHILS RELATIVE PERCENT: 66 % (ref 43–80)
PDW BLD-RTO: 16.6 FL (ref 11.5–15)
PHOSPHORUS: 2.1 MG/DL (ref 2.5–4.5)
PLATELET # BLD: 642 E9/L (ref 130–450)
PMV BLD AUTO: 9.9 FL (ref 7–12)
POLYCHROMASIA: ABNORMAL
POTASSIUM SERPL-SCNC: 3.6 MMOL/L (ref 3.5–5)
PROCALCITONIN: 0.08 NG/ML (ref 0–0.08)
RBC # BLD: 3.06 E12/L (ref 3.5–5.5)
SARS-COV-2, NAAT: NOT DETECTED
SODIUM BLD-SCNC: 137 MMOL/L (ref 132–146)
TOTAL PROTEIN: 5.7 G/DL (ref 6.4–8.3)
WBC # BLD: 11.9 E9/L (ref 4.5–11.5)

## 2021-02-09 PROCEDURE — 94640 AIRWAY INHALATION TREATMENT: CPT

## 2021-02-09 PROCEDURE — 92526 ORAL FUNCTION THERAPY: CPT | Performed by: SPEECH-LANGUAGE PATHOLOGIST

## 2021-02-09 PROCEDURE — 6370000000 HC RX 637 (ALT 250 FOR IP): Performed by: NURSE PRACTITIONER

## 2021-02-09 PROCEDURE — 2700000000 HC OXYGEN THERAPY PER DAY

## 2021-02-09 PROCEDURE — U0002 COVID-19 LAB TEST NON-CDC: HCPCS

## 2021-02-09 PROCEDURE — 85025 COMPLETE CBC W/AUTO DIFF WBC: CPT

## 2021-02-09 PROCEDURE — 6370000000 HC RX 637 (ALT 250 FOR IP): Performed by: SURGERY

## 2021-02-09 PROCEDURE — 6360000002 HC RX W HCPCS: Performed by: SURGERY

## 2021-02-09 PROCEDURE — 2060000000 HC ICU INTERMEDIATE R&B

## 2021-02-09 PROCEDURE — 80048 BASIC METABOLIC PNL TOTAL CA: CPT

## 2021-02-09 PROCEDURE — 36415 COLL VENOUS BLD VENIPUNCTURE: CPT

## 2021-02-09 PROCEDURE — 84100 ASSAY OF PHOSPHORUS: CPT

## 2021-02-09 PROCEDURE — 80076 HEPATIC FUNCTION PANEL: CPT

## 2021-02-09 PROCEDURE — 83735 ASSAY OF MAGNESIUM: CPT

## 2021-02-09 RX ORDER — LANSOPRAZOLE
15 KIT
Status: DISCONTINUED | OUTPATIENT
Start: 2021-02-09 | End: 2021-02-10 | Stop reason: HOSPADM

## 2021-02-09 RX ORDER — AMOXICILLIN AND CLAVULANATE POTASSIUM 600; 42.9 MG/5ML; MG/5ML
1800 POWDER, FOR SUSPENSION ORAL EVERY 12 HOURS SCHEDULED
Status: DISCONTINUED | OUTPATIENT
Start: 2021-02-09 | End: 2021-02-10 | Stop reason: HOSPADM

## 2021-02-09 RX ADMIN — Medication 125 MG: at 12:44

## 2021-02-09 RX ADMIN — ARFORMOTEROL TARTRATE 15 MCG: 15 SOLUTION RESPIRATORY (INHALATION) at 06:41

## 2021-02-09 RX ADMIN — Medication 125 MG: at 23:35

## 2021-02-09 RX ADMIN — METOPROLOL TARTRATE 25 MG: 25 TABLET, FILM COATED ORAL at 20:57

## 2021-02-09 RX ADMIN — ACETAMINOPHEN 650 MG: 325 TABLET, FILM COATED ORAL at 20:55

## 2021-02-09 RX ADMIN — BUDESONIDE 500 MCG: 0.5 INHALANT RESPIRATORY (INHALATION) at 18:07

## 2021-02-09 RX ADMIN — AMOXICILLIN AND CLAVULANATE POTASSIUM 1800 MG: 600; 42.9 SUSPENSION ORAL at 20:55

## 2021-02-09 RX ADMIN — BUDESONIDE 500 MCG: 0.5 INHALANT RESPIRATORY (INHALATION) at 06:42

## 2021-02-09 RX ADMIN — LANSOPRAZOLE 15 MG: KIT at 12:53

## 2021-02-09 RX ADMIN — Medication 125 MG: at 17:10

## 2021-02-09 RX ADMIN — Medication 125 MG: at 05:13

## 2021-02-09 RX ADMIN — AMOXICILLIN AND CLAVULANATE POTASSIUM 1800 MG: 600; 42.9 SUSPENSION ORAL at 12:43

## 2021-02-09 RX ADMIN — METOPROLOL TARTRATE 25 MG: 25 TABLET, FILM COATED ORAL at 10:06

## 2021-02-09 RX ADMIN — ARFORMOTEROL TARTRATE 15 MCG: 15 SOLUTION RESPIRATORY (INHALATION) at 18:07

## 2021-02-09 ASSESSMENT — PAIN SCALES - PAIN ASSESSMENT IN ADVANCED DEMENTIA (PAINAD)
FACIALEXPRESSION: 0
BODYLANGUAGE: 0
CONSOLABILITY: 0
BREATHING: 0
BODYLANGUAGE: 0
CONSOLABILITY: 0
TOTALSCORE: 0
TOTALSCORE: 0
FACIALEXPRESSION: 0

## 2021-02-09 ASSESSMENT — PAIN SCALES - GENERAL
PAINLEVEL_OUTOF10: 0
PAINLEVEL_OUTOF10: 0

## 2021-02-09 NOTE — PROGRESS NOTES
Physician Progress Note      Tania Vásquez  [facility] #:                  [id number]  :                       1925  ADMIT DATE:       2021 9:53 AM  DISCH DATE:  RESPONDING  PROVIDER #:        Alysha García CNP          QUERY TEXT:    Pt admitted with Sepsis. Noted to have Pneumonia. If possible, please document   in the progress notes and discharge summary if you are evaluating and/or   treating any of the following: The medical record reflects the following:  Risk Factors: Sepsis, PNA, Acute resp fail, UTI, NSTEMI, dysphagia  Clinical Indicators: Procalc 0.17, CXR and CTA pneumonia ,  Treatment:   PEG tube, TF. IV Zosyn, Vanc, Pulmicort, Brovana, Proventil   nebs, Rocephin, Flagyl, Unasyn. Thank you, Andrea Pratt RN [phone]  Options provided:  -- Gram negative pneumonia  -- Gram positive pneumonia  -- MRSA pneumonia  -- MSSA pneumonia  -- Aspiration pneumonia  -- Other - I will add my own diagnosis  -- Disagree - Not applicable / Not valid  -- Disagree - Clinically unable to determine / Unknown  -- Refer to Clinical Documentation Reviewer    PROVIDER RESPONSE TEXT:    This patient has aspiration pneumonia.     Query created by: Luis M Castaneda on 2021 10:29 AM      Electronically signed by:  Alysha García CNP 2021 2:09 PM

## 2021-02-09 NOTE — PROGRESS NOTES
Internal Medicine Progress Note    [facility]    Ewelina Canales. ERUM Franz D.O., ERUM Chery D.O. Jb Pascal, MSN, APRN, NP-C  Francisco Marcos. Phuong Kaur, MSN, APRN-CNP     Primary Care Physician: Nicole Alvarez MD   Admitting Physician:  Margarita Miranda DO  Admission date and time: 2/2/2021  9:53 AM    Room:  [address] [place], [place] [address]  Admitting diagnosis: Severe sepsis [facility]) [A41.9, R65.20]    Patient Name: Cristofer Mei  MRN: [id number]    Date of Service: 2/9/2021     Subjective:  Rico Renae is a 80 y.o. female who was seen and examined today,2/9/2021, at the bedside. She tolerated PEG placement and trickle tube feeds well. Medications are being transition to PEG administration as IV access is very limited. Pending continued tolerance, she will be for discharge to skilled nursing facility tomorrow. No family present. Review of System: Unable to be obtained secondary to underlying dementia/lethargy. Physical Exam:  I/O this shift:  In: -   Out: 250 [Urine:250]    Intake/Output Summary (Last 24 hours) at 2/9/2021 1053  Last data filed at 2/9/2021 0843  Gross per 24 hour   Intake 350 ml   Output 2350 ml   Net -2000 ml   I/O last 3 completed shifts: In: 350 [I.V.:100; NG/GT:250]  Out: 2100 [Urine:2100]  No data found. Vital Signs:   Blood pressure 129/60, pulse 112, temperature 98.3 °F (36.8 °C), temperature source Axillary, resp. rate 20, height 5' 4\" (1.626 m), weight 141 lb 4.8 oz (64.1 kg), SpO2 100 %. General appearance:  More awake and alert, less ill-appearing. In no acute distress. Head:  Normocephalic. No masses, lesions or tenderness. Eyes:  PERRLA. EOMI. Sclera clear. ENT:  Ears normal. Buccal mucosa less dry   Neck:    Supple. Trachea midline. No thyromegaly. No JVD. No bruits. Heart:    Rhythm regular. Regular rate, systolic murmur, pacemaker left chest-No overt signs of skin erosion.   Lungs: Symmetrical.  Improved aeration bilaterally. Regular, even, nonlabored  Abdomen:   Soft. Unremarkable postoperative appearance with PEG in place. Abdominal binder overlying. No longer distended. Bowel sounds normoactive. Nontender to palpation. Extremities:    Peripheral pulses present. No peripheral edema. Neurologic:    Awake, arouses to verbal stimuli easily. Tracks examiner during conversation. Does not answer questions or follow commands. Significant improvement in level consciousness from admission. Psych:   Awake and alert, no more than mildly agitated. Musculoskeletal:   No joint edema, enlargement or tenderness appreciated gait not assessed. Integumentary:  No rashes  Skin normal color and texture. Patient has scattered ecchymotic areas on the upper extremities in various stages of healing. Genitalia/Breast:  Casey catheter.       Medication:  Scheduled Meds:   vancomycin  125 mg Per G Tube 4 times per day    amoxicillin-clavulanate  1,800 mg Per G Tube 2 times per day    metoprolol tartrate  25 mg PEG Tube BID    esomeprazole  1 capsule PEG Tube Daily    sodium chloride flush  10 mL Intravenous 2 times per day    budesonide  0.5 mg Nebulization BID    Arformoterol Tartrate  15 mcg Nebulization BID    enoxaparin  1 mg/kg Subcutaneous BID     Continuous Infusions:   sodium chloride         Objective Data:  CBC with Differential:    Lab Results   Component Value Date    WBC 11.9 02/09/2021    RBC 3.06 02/09/2021    HGB 8.5 02/09/2021    HCT 29.5 02/09/2021     02/09/2021    MCV 96.4 02/09/2021    MCH 27.8 02/09/2021    MCHC 28.8 02/09/2021    RDW 16.6 02/09/2021    NRBC 0.9 02/07/2021    SEGSPCT 58 05/06/2013    METASPCT 4.0 02/09/2021    LYMPHOPCT 18.0 02/09/2021    PROMYELOPCT 4.0 11/12/2020    MONOPCT 4.0 02/09/2021    MYELOPCT 2.0 02/09/2021    BASOPCT 1.0 02/09/2021    MONOSABS 0.48 02/09/2021    LYMPHSABS 2.14 02/09/2021    EOSABS 0.60 02/09/2021 BASOSABS 0.12 02/09/2021     BMP:    Lab Results   Component Value Date     02/09/2021    K 3.6 02/09/2021    K 3.7 02/02/2021     02/09/2021    CO2 19 02/09/2021    BUN 4 02/09/2021    LABALBU 2.3 02/09/2021    LABALBU 4.1 04/10/2012    CREATININE 0.3 02/09/2021    CALCIUM 8.0 02/09/2021    GFRAA >60 02/09/2021    LABGLOM >60 02/09/2021    GLUCOSE 81 02/09/2021    GLUCOSE 87 04/10/2012     Last 3 Troponin:    Lab Results   Component Value Date    TROPONINI 0.02 02/07/2021    TROPONINI 0.07 02/02/2021    TROPONINI <0.01 11/01/2020     U/A:    Lab Results   Component Value Date    COLORU Straw 02/02/2021    PROTEINU 30 02/02/2021    PHUR 5.5 02/02/2021    WBCUA >20 02/02/2021    WBCUA NONE 11/10/2010    RBCUA 2-5 02/02/2021    RBCUA NONE 11/10/2010    BACTERIA MANY 02/02/2021    CLARITYU CLOUDY 02/02/2021    SPECGRAV >=1.030 02/02/2021    LEUKOCYTESUR MODERATE 02/02/2021    UROBILINOGEN 0.2 02/02/2021    BILIRUBINUR Negative 02/02/2021    BILIRUBINUR NEGATIVE 11/10/2010    BLOODU MODERATE 02/02/2021    GLUCOSEU Negative 02/02/2021    GLUCOSEU NEGATIVE 11/10/2010       Wound Documentation:   Wound 11/01/20 Elbow Right (Active)   Number of days: 93       Wound 11/01/20 Heel Right;Lateral (Active)   Wound Etiology Pressure Unstageable 02/02/21 1716   Dressing Status Intact 02/02/21 2013   Wound Cleansed Not Cleansed 02/02/21 1716   Dressing/Treatment Foam 02/02/21 2013   Dressing Change Due 02/09/21 02/02/21 1716   Wound Assessment Dry;Eschar dry 02/02/21 1716   Drainage Amount None 02/02/21 2013   Odor None 02/02/21 1716   Arelis-wound Assessment Intact 02/02/21 1716   Number of days: 93       Wound 11/01/20 Heel Left;Lateral (Active)   Dressing Status Intact 02/02/21 2013   Dressing/Treatment Foam 02/02/21 2013   Number of days: 93       Wound 02/02/21 Sacrum Mid pinpoint sized (Active)   Wound Etiology Pressure Stage  2 02/02/21 1726   Dressing Status Intact 02/02/21 2013 More than 50% of my  time was spent at the bedside counseling/coordinating care with the patient and/or family with face to face contact. This time was spent reviewing notes and laboratory data as well as instructing and counseling the patient. Time I spent with the family or surrogate(s) is included only if the patient was incapable of providing the necessary information or participating in medical decisions. I also discussed the differential diagnosis and all of the proposed management plans with the patient and individuals accompanying the patientSamantha Mooer requires this high level of physician care and nursing on the IMC/Telemetry unit due the complexity of decision management and chance of rapid decline or death. Continued cardiac monitoring and higher level of nursing are required. I am readily available for any further decision-making and intervention.        PAUL Laws-CNP  10:53 AM  2/9/2021

## 2021-02-09 NOTE — CARE COORDINATION
SS NOTE: COVID NEGATIVE 2/2. Pt had PEG tube placed yesterday. [facility] to [facility] today. Will await result. ([facility] [phone]: [phone])). For the SNF return pt will need a PRECERT, signed UY. SS to continue. Aj Moore. 2/9/2021.9:41AM.

## 2021-02-09 NOTE — PROGRESS NOTES
[facility]  [facility]  Progress Note    Chief Complaint   Patient presents with    Fever     from NH, usually demented, SOB and fever per NH     SUBJECTIVE:  Laying in bed, more awake and alert   Afebrile, no leukocytosis   2L    PEG in place   Patient is tolerating medications. No reported adverse drug reactions. Review of systems:  As stated above in the chief complaint, otherwise negative. Medications:  Scheduled Meds:   vancomycin  125 mg Per G Tube 4 times per day    amoxicillin-clavulanate  1,800 mg Per G Tube 2 times per day    metoprolol tartrate  25 mg PEG Tube BID    lansoprazole  15 mg Per NG tube QAM AC    [START ON 2/10/2021] enoxaparin  30 mg Subcutaneous Daily    sodium chloride flush  10 mL Intravenous 2 times per day    budesonide  0.5 mg Nebulization BID    Arformoterol Tartrate  15 mcg Nebulization BID     Continuous Infusions:   sodium chloride       PRN Meds:sodium chloride, sodium chloride flush, acetaminophen **OR** acetaminophen, albuterol, magnesium sulfate, sodium phosphate IVPB **OR** sodium phosphate IVPB, potassium chloride    OBJECTIVE:  /60   Pulse 112   Temp 98.3 °F (36.8 °C) (Axillary)   Resp 20   Ht 5' 4\" (1.626 m)   Wt 141 lb 4.8 oz (64.1 kg)   SpO2 100%   BMI 24.25 kg/m²   Temp  Av.6 °F (36.4 °C)  Min: 97.2 °F (36.2 °C)  Max: 98.3 °F (36.8 °C)  Constitutional:  The patient is more awake and alert, cachetic   Skin:    Warm and dry. HEENT:      AT/NC  Chest:     Symmetrical expansion. Dec bs ant no wheeze  Cardiovascular:  S1 and S2 are rhythmic and regular   Abdomen:      SOFT distended NGT distended ++ PEG with binder   Extremities:     Edema.  RIGHT HEEL ESCHAR dressed  CNS    Awake and alert   Lines: PIV     Radiology:  Laboratory and Tests Review:  Lab Results   Component Value Date    WBC 11.9 (H) 2021    WBC 11.3 2021    WBC 12.7 (H) 2021    HGB 8.5 (L) 2021 Overall prognosis appears to be poor    I have discussed the case, including pertinent history and physical  exam findings . I have seen and examined the patient and the key elements of the encounter have been performed by me. I agree with the assessment, plan and orders as documented.       Treatment plan as per my recommendation     Rhonda Alanis MD, FACP  2/10/2021  8:34 AM

## 2021-02-09 NOTE — CARE COORDINATION
SS NOTE: COVID NEGATIVE 2/2. Pt had PEG tube placed yesterday. [facility] to began Shiv today. Will await result. ([facility] [phone]: [phone])). For the SNF return pt will need a PRECERT, NEGATIVE COVID- rapid test given to nursing today, a signed YU. SS to continue. Asha Moore. 2/9/2021.3:40PM.

## 2021-02-10 VITALS
OXYGEN SATURATION: 100 % | RESPIRATION RATE: 22 BRPM | TEMPERATURE: 98.5 F | HEART RATE: 102 BPM | BODY MASS INDEX: 24.12 KG/M2 | DIASTOLIC BLOOD PRESSURE: 68 MMHG | HEIGHT: 64 IN | WEIGHT: 141.3 LBS | SYSTOLIC BLOOD PRESSURE: 122 MMHG

## 2021-02-10 LAB
ALBUMIN SERPL-MCNC: 2.2 G/DL (ref 3.5–5.2)
ALP BLD-CCNC: 90 U/L (ref 35–104)
ALT SERPL-CCNC: 15 U/L (ref 0–32)
ANION GAP SERPL CALCULATED.3IONS-SCNC: 11 MMOL/L (ref 7–16)
ANISOCYTOSIS: ABNORMAL
AST SERPL-CCNC: 29 U/L (ref 0–31)
BASOPHILS ABSOLUTE: 0 E9/L (ref 0–0.2)
BASOPHILS RELATIVE PERCENT: 0.4 % (ref 0–2)
BILIRUB SERPL-MCNC: 0.2 MG/DL (ref 0–1.2)
BILIRUBIN DIRECT: <0.2 MG/DL (ref 0–0.3)
BILIRUBIN, INDIRECT: ABNORMAL MG/DL (ref 0–1)
BLOOD CULTURE, ROUTINE: NORMAL
BUN BLDV-MCNC: 10 MG/DL (ref 8–23)
CALCIUM SERPL-MCNC: 8.1 MG/DL (ref 8.6–10.2)
CHLORIDE BLD-SCNC: 107 MMOL/L (ref 98–107)
CO2: 20 MMOL/L (ref 22–29)
CREAT SERPL-MCNC: 0.4 MG/DL (ref 0.5–1)
CULTURE, BLOOD 2: NORMAL
EOSINOPHILS ABSOLUTE: 0.5 E9/L (ref 0.05–0.5)
EOSINOPHILS RELATIVE PERCENT: 3.5 % (ref 0–6)
GFR AFRICAN AMERICAN: >60
GFR NON-AFRICAN AMERICAN: >60 ML/MIN/1.73
GLUCOSE BLD-MCNC: 154 MG/DL (ref 74–99)
HCT VFR BLD CALC: 27.5 % (ref 34–48)
HEMOGLOBIN: 8.4 G/DL (ref 11.5–15.5)
LYMPHOCYTES ABSOLUTE: 3.12 E9/L (ref 1.5–4)
LYMPHOCYTES RELATIVE PERCENT: 21.7 % (ref 20–42)
MAGNESIUM: 2 MG/DL (ref 1.6–2.6)
MCH RBC QN AUTO: 27.7 PG (ref 26–35)
MCHC RBC AUTO-ENTMCNC: 30.5 % (ref 32–34.5)
MCV RBC AUTO: 90.8 FL (ref 80–99.9)
MONOCYTES ABSOLUTE: 1.14 E9/L (ref 0.1–0.95)
MONOCYTES RELATIVE PERCENT: 7.8 % (ref 2–12)
MYELOCYTE PERCENT: 1.7 % (ref 0–0)
NEUTROPHILS ABSOLUTE: 9.51 E9/L (ref 1.8–7.3)
NEUTROPHILS RELATIVE PERCENT: 65.2 % (ref 43–80)
OVALOCYTES: ABNORMAL
PDW BLD-RTO: 16.8 FL (ref 11.5–15)
PHOSPHORUS: 2.1 MG/DL (ref 2.5–4.5)
PLATELET # BLD: 726 E9/L (ref 130–450)
PMV BLD AUTO: 9.4 FL (ref 7–12)
POIKILOCYTES: ABNORMAL
POLYCHROMASIA: ABNORMAL
POTASSIUM SERPL-SCNC: 3.3 MMOL/L (ref 3.5–5)
RBC # BLD: 3.03 E12/L (ref 3.5–5.5)
SODIUM BLD-SCNC: 138 MMOL/L (ref 132–146)
TEAR DROP CELLS: ABNORMAL
TOTAL PROTEIN: 5.7 G/DL (ref 6.4–8.3)
WBC # BLD: 14.2 E9/L (ref 4.5–11.5)

## 2021-02-10 PROCEDURE — 83735 ASSAY OF MAGNESIUM: CPT

## 2021-02-10 PROCEDURE — 6370000000 HC RX 637 (ALT 250 FOR IP): Performed by: INTERNAL MEDICINE

## 2021-02-10 PROCEDURE — 80048 BASIC METABOLIC PNL TOTAL CA: CPT

## 2021-02-10 PROCEDURE — 6360000002 HC RX W HCPCS: Performed by: INTERNAL MEDICINE

## 2021-02-10 PROCEDURE — 97110 THERAPEUTIC EXERCISES: CPT

## 2021-02-10 PROCEDURE — 6360000002 HC RX W HCPCS: Performed by: SURGERY

## 2021-02-10 PROCEDURE — 36415 COLL VENOUS BLD VENIPUNCTURE: CPT

## 2021-02-10 PROCEDURE — 2700000000 HC OXYGEN THERAPY PER DAY

## 2021-02-10 PROCEDURE — 84100 ASSAY OF PHOSPHORUS: CPT

## 2021-02-10 PROCEDURE — 85025 COMPLETE CBC W/AUTO DIFF WBC: CPT

## 2021-02-10 PROCEDURE — 6370000000 HC RX 637 (ALT 250 FOR IP): Performed by: NURSE PRACTITIONER

## 2021-02-10 PROCEDURE — 94640 AIRWAY INHALATION TREATMENT: CPT

## 2021-02-10 PROCEDURE — 80076 HEPATIC FUNCTION PANEL: CPT

## 2021-02-10 RX ORDER — AMOXICILLIN AND CLAVULANATE POTASSIUM 600; 42.9 MG/5ML; MG/5ML
1800 POWDER, FOR SUSPENSION ORAL EVERY 12 HOURS SCHEDULED
Qty: 150 ML | Refills: 0 | DISCHARGE
Start: 2021-02-10 | End: 2021-02-15

## 2021-02-10 RX ORDER — ALBUTEROL SULFATE 2.5 MG/3ML
2.5 SOLUTION RESPIRATORY (INHALATION) EVERY 4 HOURS PRN
Qty: 120 EACH | Refills: 3 | Status: SHIPPED
Start: 2021-02-10 | End: 2021-02-10 | Stop reason: HOSPADM

## 2021-02-10 RX ORDER — ALBUTEROL SULFATE 90 UG/1
2 AEROSOL, METERED RESPIRATORY (INHALATION) EVERY 4 HOURS PRN
Qty: 3 INHALER | Refills: 1 | Status: SHIPPED | OUTPATIENT
Start: 2021-02-10

## 2021-02-10 RX ORDER — POTASSIUM BICARBONATE 25 MEQ/1
50 TABLET, EFFERVESCENT ORAL ONCE
Status: DISCONTINUED | OUTPATIENT
Start: 2021-02-10 | End: 2021-02-10 | Stop reason: CLARIF

## 2021-02-10 RX ORDER — LANSOPRAZOLE
15 KIT
Qty: 300 ML | DISCHARGE
Start: 2021-02-11

## 2021-02-10 RX ADMIN — AMOXICILLIN AND CLAVULANATE POTASSIUM 1800 MG: 600; 42.9 SUSPENSION ORAL at 10:30

## 2021-02-10 RX ADMIN — Medication 125 MG: at 13:06

## 2021-02-10 RX ADMIN — METOPROLOL TARTRATE 25 MG: 25 TABLET, FILM COATED ORAL at 10:30

## 2021-02-10 RX ADMIN — ARFORMOTEROL TARTRATE 15 MCG: 15 SOLUTION RESPIRATORY (INHALATION) at 04:11

## 2021-02-10 RX ADMIN — POTASSIUM PHOSPHATE, MONOBASIC 500 MG: 500 TABLET, SOLUBLE ORAL at 13:05

## 2021-02-10 RX ADMIN — BUDESONIDE 500 MCG: 0.5 INHALANT RESPIRATORY (INHALATION) at 04:11

## 2021-02-10 RX ADMIN — Medication 125 MG: at 04:45

## 2021-02-10 RX ADMIN — ENOXAPARIN SODIUM 30 MG: 30 INJECTION SUBCUTANEOUS at 10:29

## 2021-02-10 RX ADMIN — LANSOPRAZOLE 15 MG: KIT at 10:30

## 2021-02-10 RX ADMIN — POTASSIUM BICARBONATE 40 MEQ: 782 TABLET, EFFERVESCENT ORAL at 13:05

## 2021-02-10 ASSESSMENT — PAIN SCALES - GENERAL: PAINLEVEL_OUTOF10: 0

## 2021-02-10 NOTE — CARE COORDINATION
SS NOTE: COVID NEGATIVE 2/2 and 2/9. Arrangements completed for pt to be transferred back to [facility] today at [facility] via [employer]. ([place] [phone]: [phone])). Pt's dtr made aware of this plan. For the SNF return pt needs the PRECERT and the  NEGATIVE COVID, and a signed YU. Boyd Moore. 2/10/2021.10:57AM.

## 2021-02-10 NOTE — FLOWSHEET NOTE
Inpatient Wound Care    Admit Date: 2/2/2021  9:53 AM    Reason for consult:  HEELS    Significant history:    Past Medical History:   Diagnosis Date    Anemia     Cellulitis     GERD (gastroesophageal reflux disease)     Glaucoma     Hypertension     Hypokalemia     Insomnia     Osteoarthritis     Prediabetes 11/4/2020    Rheumatoid arthritis(714.0)        Wound history:      Findings:       02/10/21 1158   Skin Integrity Site 2   Skin Integrity Location 2 Redness   Location 2 BL HEELS   Skin Integrity Site 5   Skin Integrity Location 5 Redness   Location 5 SACRAL       Impression:  Heels are sl sl no open areas    Interventions in place:  Medline heel protectors  Low air loss bed    Plan:  Cont pressure relief with heel protectors      Guerline Amen 2/10/2021 11:59 AM

## 2021-02-10 NOTE — PROGRESS NOTES
Physical Therapy    Physical Therapy Treatment Note    Room #:  6588/2809-54  Patient Name: Feliberto Wong  YOB: 1925  MRN: [id number]    Referring Provider:   PAUL Brantley CNP   Date of Service: 2/10/2021  Evaluating Physical Therapist: Macky Goldmann, PT  #[id number]    Diagnosis: Severe sepsis ([address] [A41.9, R65.20] Admitted with fever, UTI    Patient Active Problem List   Diagnosis    DVT (deep venous thrombosis) ([address]    Anemia    Rheumatoid arthritis ([address]    Cervical spondylosis    Lumbar spondylosis    Shoulder joint dysfunction    DDD (degenerative disc disease)    Spinal stenosis    Vertebral compression fracture     Bulging lumbar disc    Cervical spinal canal tumor    Dementia associated with other underlying disease with behavioral disturbance ([address]    COVID-19    Prediabetes    Severe sepsis (HCC)    Elevated troponin    Hypernatremia    Altered mental status    Fecal impaction ([address]      Tentative placement recommendation: Skilled Nursing Facility     Equipment recommendation: Equipment at Nursing Home      Prior Level of Function: unknown  Rehab Potential: fair for baseline    Past medical history:   Past Medical History:   Diagnosis Date    Anemia     Cellulitis     GERD (gastroesophageal reflux disease)     Glaucoma     Hypertension     Hypokalemia     Insomnia     Osteoarthritis     Prediabetes 11/4/2020    Rheumatoid arthritis(714.0)      Past Surgical History:   Procedure Laterality Date    EYE SURGERY      cataracts    JOINT REPLACEMENT Left     OTHER SURGICAL HISTORY Right 8/7/2015    IM nailing right femor    UPPER GASTROINTESTINAL ENDOSCOPY N/A 2/8/2021    EGD  PEG TUBE INSERTION performed by Carlos Leggett MD at [facility] ENDOSCOPY     Precautions:  Up with assistance, falls, alarm and O2, hi mira 2-3 Liters of o2, covid in 11/21  Dementia, PEG     SUBJECTIVE:    Social history: Patient lives in a skilled nursing facility Equipment owned: unknown    AM-PAC Basic Mobility   AM-PAC Mobility Inpatient   How much difficulty turning over in bed?: Unable  How much difficulty sitting down on / standing up from a chair with arms?: Unable  How much difficulty moving from lying on back to sitting on side of bed?: Unable  How much help from another person moving to and from a bed to a chair?: Total  How much help from another person needed to walk in hospital room?: Total  How much help from another person for climbing 3-5 steps with a railing?: Total  AM-PAC Inpatient Mobility Raw Score : 6  AM-PAC Inpatient T-Scale Score : 23.55  Mobility Inpatient CMS 0-100% Score: 100  Mobility Inpatient CMS G-Code Modifier : CN    Nursing cleared patient for PT treatment. OBJECTIVE;   Initial Evaluation  Date: 2/3/2021 Treatment Date:  2/10/2021   Short Term/ Long Term   Goals   Was pt agreeable to Eval/treatment? Yes   Pt non verbal this session  To be met in 5 days   Pain level   0/10  Grimaces with shoulder rom Slight grimace with lower extremith ex    Bed Mobility  Rolling: Dependent assist of 1    Supine to sit: Not assessed     Sit to supine: Not assessed     Scooting: Not assessed    Rolling: Not assessed    Supine to sit: Not assessed    Sit to supine: Not assessed    Scooting: Not assessed   Rolling: Maximal assist of 1    Supine to sit: Maximal assist of 1    Sit to supine: Maximal assist of 1    Scooting: Maximal assist of 1     ROM Within functional limits with exception of bilateral shoulders and bilateral ankles    Increase range of motion 10% of affected joints    Strength No active movement observed and Does not follow manual muscle testing commands         Patient education  Patient educated on role of Physical Therapy, risks of immobility, safety and plan of care.     Patient response to education:   Pt verbalized understanding Pt demonstrated skill Pt requires further education in this area   No No Yes

## 2021-02-10 NOTE — DISCHARGE SUMMARY
Internal Medicine Progress Note     FATUMA=[facility]     Aixa Christensen. Pio Morales., F.A.C.O.I. Jessica Trevizo D.O., FSamanthaA.C.OSamanthaISamantha Moody D.O. Hanane Alonso, MSN, APRN, NP-C  Willi Obrien.  Alfie Ford, MSN, APRN-CNP       Internal Medicine  Discharge Summary    NAME: Aj Sampson  :  1925  MRN:  [id number]  Christ Palomares MD  ADMITTED: 2021      DISCHARGED: 2/10/21    ADMITTING PHYSICIAN: Alesha Bridges DO    CONSULTANT(S):   IP CONSULT TO PALLIATIVE CARE  IP CONSULT TO INFECTIOUS DISEASES  IP CONSULT TO GENERAL SURGERY  IP CONSULT TO CARDIOLOGY     ADMITTING DIAGNOSIS:   Severe sepsis ([facility] [address] [A41.9, R65.20]     DISCHARGE DIAGNOSES:   · Severe sepsis associated with acute organ dysfunction  · Enterococcus faecalis urinary tract infection secondary to chronic indwelling urinary catheter  · Acute respiratory failure with hypoxia in the setting of bilateral pneumonia with recent Covid infection (2020)  · Hypovolemic hypernatremia and hyperchloremia due to poor intake and dehydration  · Elevated troponin, likely non-STEMI due to sepsis  · Acute diverticulitis   · Abnormal appearance of the spleen on imaging with concern for splenic infarct   · Mild normocytic normochromic anemia  · Acute metabolic encephalopathy superimposed on advanced dementia  · History of Covid antibodies    BRIEF HISTORY OF PRESENT ILLNESS: Vickery ill elderly female patient who presented to the emergency department for altered mental status. She had a very extended hospitalization at [facility] in November 2020 where she presented with similar syndrome. She had very poor intake and required significant assistance with eating and drinking. Developed hypernatremia, acute renal failure and suffered from Covid infection as well. At that time, the patient had a CorPak placed for alternative means of hydration and nutrition. PEG tube has been contemplated per their documentation for the patient, with treatment of the underlying disease processes, improved to the point that she was able to become alert enough to receive adequate modified nutritional intake intake to without required. She was transitioned back to skilled nursing facility.     She presented to the emergency department today with fever and altered mental status. She reportedly has quite severe dementia and is very limited at baseline. Nursing staff reports her being febrile, tachypneic and tachycardic. ER evaluation was undertaken and revealed severe sepsis due to UTI with hypernatremia and hyperchloremia. CTs of the chest, abdomen pelvis were ordered and pending. CODE STATUS was confirmed as DNR CCA. Unfortunately due to the patient's condition and underlying dementia history cannot be obtained from her and is quite limited.     LABS[de-identified]  Lab Results   Component Value Date    WBC 14.2 (H) 02/10/2021    HGB 8.4 (L) 02/10/2021    HCT 27.5 (L) 02/10/2021     (H) 02/10/2021     02/10/2021    K 3.3 (L) 02/10/2021     02/10/2021    CREATININE 0.4 (L) 02/10/2021    BUN 10 02/10/2021    CO2 20 (L) 02/10/2021    GLUCOSE 154 (H) 02/10/2021    ALT 15 02/10/2021    AST 29 02/10/2021    INR 1.2 11/13/2020     Lab Results   Component Value Date    INR 1.2 11/13/2020    INR 1.0 11/12/2020    INR 1.0 11/11/2020    PROTIME 13.1 (H) 11/13/2020    PROTIME 11.3 11/12/2020 PROTIME 11.1 11/11/2020      Lab Results   Component Value Date    TSH 1.230 08/08/2015     Lab Results   Component Value Date    TRIG 83 02/03/2021    TRIG 69 05/03/2013    TRIG 36 04/18/2013     Lab Results   Component Value Date    HDL 30 02/03/2021    HDL 56.0 05/03/2013    HDL 69.0 04/18/2013     Lab Results   Component Value Date    LDLCALC 60 02/03/2021    LDLCALC 79 05/03/2013    LDLCALC 82 04/18/2013     Lab Results   Component Value Date    LABA1C 5.1 02/03/2021       IMAGING:  Ct Head W Contrast    Result Date: 2/6/2021  EXAMINATION: CT OF THE HEAD WITH CONTRAST  2/6/2021 5:02 pm TECHNIQUE: CT of the head/brain was performed with the administration of intravenous contrast. Multiplanar reformatted images are provided for review. Dose modulation, iterative reconstruction, and/or weight based adjustment of the mA/kV was utilized to reduce the radiation dose to as low as reasonably achievable. COMPARISON: CT head without contrast, 10/22/2015. HISTORY: ORDERING SYSTEM PROVIDED HISTORY: ams TECHNOLOGIST PROVIDED HISTORY: Reason for exam:->ams FINDINGS: BRAIN/VENTRICLES: There is a stable densely calcified 1.3 x 0.7 cm meningioma along the left occipital convexity. Mass, mass effect, edema or hemorrhage is seen in the brain. Moderate volume loss is seen in the cerebrum with moderate chronic microvascular ischemic changes. No hydrocephalus or extra-axial fluid is seen. ORBITS: The visualized portion of the orbits demonstrate no acute abnormality. SINUSES: The visualized paranasal sinuses and mastoid air cells demonstrate no acute abnormality. SOFT TISSUES/SKULL:  No acute abnormality of the visualized skull or soft tissues. 1. No acute abnormality. 2. Stable 1.3 x 0.7 cm calcified meningioma along the left occipital convexity. No significant mass effect on the brain.     Ct Abdomen Pelvis W Iv Contrast Additional Contrast? None    Result Date: 2/2/2021 EXAMINATION: CT OF THE ABDOMEN AND PELVIS WITH CONTRAST 2/2/2021 11:59 am TECHNIQUE: CT of the abdomen and pelvis was performed with the administration of intravenous contrast. Multiplanar reformatted images are provided for review. Dose modulation, iterative reconstruction, and/or weight based adjustment of the mA/kV was utilized to reduce the radiation dose to as low as reasonably achievable. COMPARISON: None. HISTORY: ORDERING SYSTEM PROVIDED HISTORY: abdominal pain TECHNOLOGIST PROVIDED HISTORY: Reason for exam:->abdominal pain Additional Contrast?->None FINDINGS: The lung bases demonstrate COPD with minimal atelectasis/infiltrates concerning for pneumonia including viral infection. There is coronary artery calcification. A hiatal hernia is present. Liver is of normal architecture. Gallbladder is partially distended. A wedge-shaped hypodensity is identified in the spleen concerning for splenic infarct. Pancreas appears normal. Bilateral adrenal nodules are noted with larger 1 on the left measuring 1.6 cm. There is dilated collecting system in the kidneys. There is calcification aorta and degenerative changes in the lumbar spine. Pelvis. There is diffuse diverticulosis of the colon with significantly distended and thickened rectosigmoid colon with the presacral edema. There is mass effect on the bladder which is displaced anteriorly with wall thickening and indwelling Casey catheter. Diverticulosis of the colon with significantly distended and thickened rectosigmoid colon concerning for proctitis/diverticulitis. Contracted urinary bladder which is displaced anteriorly with wall thickening concerning for cystitis. Hypodensity in the spleen concerning for splenic infarct. Multifocal infiltrates in the lung bases concerning for pneumonia.     Xr Chest Portable    Result Date: 2/3/2021 EXAMINATION: CTA OF THE CHEST 2/2/2021 11:59 am TECHNIQUE: CTA of the chest was performed after the administration of intravenous contrast.  Multiplanar reformatted images are provided for review. MIP images are provided for review. Dose modulation, iterative reconstruction, and/or weight based adjustment of the mA/kV was utilized to reduce the radiation dose to as low as reasonably achievable. COMPARISON: None. HISTORY: ORDERING SYSTEM PROVIDED HISTORY: SOB, febrile TECHNOLOGIST PROVIDED HISTORY: Reason for exam:->SOB, febrile Decision Support Exception->Emergency Medical Condition (MA) FINDINGS: Pulmonary Arteries: Pulmonary arteries are adequately opacified for evaluation. No evidence of intraluminal filling defect to suggest pulmonary embolism. Main pulmonary artery is normal in caliber. Mediastinum: No evidence of mediastinal lymphadenopathy. The heart and pericardium demonstrate no acute abnormality. There is no acute abnormality of the thoracic aorta. Lungs/pleura: Multifocal bilateral vague hazy pulmonary infiltrates are noted. While these findings could represent pneumonia they can also represent pneumonia superimposed on parenchymal scarring given the patient's age. There is no pleural thickening or pleural effusion. Upper Abdomen: Limited images of the upper abdomen are unremarkable. There is a moderate sized hiatal hernia. Soft Tissues/Bones: No acute bone or soft tissue abnormality. 1. There is no evidence of a pulmonary embolus. 2. Multifocal bilateral hazy pulmonary infiltrates. These findings could represent pneumonia or possibly pneumonia superimposed on pleuroparenchymal scarring. 3. There is no lung consolidation and there is no pleural effusion or pleural thickening.     Xr Abdomen For Ng/og/ne Tube Placement    Result Date: 2/5/2021 EXAMINATION: ONE SUPINE XRAY VIEW(S) OF THE ABDOMEN 2/5/2021 9:34 pm COMPARISON: X-ray for NG tube placement on February 5, 2021 at 18:00 HISTORY: [address]: Confirmation of course of NG/OG/NE tube and location of tip of tube TECHNOLOGIST PROVIDED HISTORY: Reason for exam:->Confirmation of course of NG/OG/NE tube and location of tip of tube Portable? ->Yes FINDINGS: Coarse interstitial markings in the imaged lungs. No airspace disease, pleural effusions, or pneumothoraces. Atherosclerotic disease in the aortic arch. Imaged upper abdomen is unremarkable. Enteric tube is now seen and extends subdiaphragmatic with distal tip and side port projecting in the expected location of the stomach. Distal tip projects to the right of the L3 vertebrae. Enteric tube is now visualized and is in satisfactory position. Xr Abdomen For Ng/og/ne Tube Placement    Result Date: 2/5/2021  EXAMINATION: ONE SUPINE XRAY VIEW(S) OF THE ABDOMEN 2/5/2021 6:00 pm COMPARISON: Chest series from February 3, 2021 HISTORY: ORDERING SYSTEM PROVIDED HISTORY: FOR NG TUBE PLACEMENT TECHNOLOGIST PROVIDED HISTORY: Confirmation of course of NG/OG/NE tube and location of tip of tube Reason for exam:->FOR NG TUBE PLACEMENT Portable? ->Yes FINDINGS: An enteric tube is not visualized on the image provided. Lower lungs are clear. Coarse interstitial markings present. Imaged bowel gas pattern is unremarkable. No pneumatosis or portal venous gas. Status post bilateral hip arthroplasties. An enteric tube is not visualized on this image. If an enteric tube has been placed it possibly remains within the esophagus off the field of view. There was a previous enteric tube on the prior chest series which is no longer visualized. The findings were sent to the Radiology Results [address] at 6:39 pm on 2/5/2021to be communicated to a licensed caregiver.     Xr Abdomen For Ng/og/ne Tube Placement    Result Date: 2/2/2021 EXAMINATION: ONE SUPINE XRAY VIEW(S) OF THE ABDOMEN 2/2/2021 7:32 pm COMPARISON: 11/06/2020 HISTORY: ORDERING SYSTEM PROVIDED HISTORY: Confirmation of course of NG/OG/NE tube and location of tip of tube TECHNOLOGIST PROVIDED HISTORY: Reason for exam:->Confirmation of course of NG/OG/NE tube and location of tip of tube Portable? ->Yes FINDINGS: A nasogastric tube is identified looped in the  lower thoracic spine probably looped back on itself in the esophagus and need to be repositioned. No nasogastric tube is noted in the abdomen. Contrast is identified in the renal collecting system    Nasogastric tube probably looped in the distal esophagus and needs to be repositioned.  Findings were telephoned to the [facility] COURSE: Teresa spent an extended period of time hospitalized and this will be a brief synopsis of the events that occurred during the hospitalization. She presented from the skilled nursing facility acutely altered with multiple underlying issues. She had recently been discharged from [facility] where she was treated for severe dehydration and and required Corpak placement initially for nutritional support. She was eventually able to tolerate a diet and was discharged to the skilled nursing facility. During her hospitalization here, she was found to be suffering from severe sepsis with acute organ dysfunction. She was found to have an Enterococcus urinary tract infection secondary to a chronic indwelling catheter. She was also found to be suffering from bilateral pneumonia with a recent history of Covid infection. She had elevated troponin in the setting of non-ST elevated myocardial infarction type II. Unfortunately, her overall condition remained somewhat grave. She was evaluated by multiple subspecialists including the infectious disease, general surgery, and palliative care teams. Family ultimately opted to move forward with PEG tube placement for nutrition. PEG tube was placed without event and she was started on tube feeds and advance to goal rate. Electrolyte derangements were addressed. She remained obtunded in the setting of dementia. Her long-term prognosis remains poor at this point and family has been updated. Palliative care has been intricately involved in management decisions thus far. She is acceptable to return to the nursing home facility.      BRIEF PHYSICAL EXAMINATION AND LABORATORIES ON DAY OF DISCHARGE:  VITALS:  /68   Pulse 120   Temp 98.5 °F (36.9 °C) (Axillary)   Resp 22   Ht 5' 4\" (1.626 m)   Wt 141 lb 4.8 oz (64.1 kg)   SpO2 100%   BMI 24.25 kg/m²     General appearance: Awakens to verbal and painful stimulus. She is not answering questions nor is she following commands. She appears to have reached a new chronically debilitated baseline. Head:  Normocephalic. No masses, lesions or tenderness. Eyes:  PERRLA. EOMI. Sclera clear. ENT:  Ears normal. Buccal mucosa less dry   Neck:    Supple. Trachea midline. No thyromegaly. No JVD. No bruits. Heart:    Rhythm regular. Regular rate, systolic murmur, pacemaker left chest-No overt signs of skin erosion. Lungs:    Symmetrical.  Improved aeration bilaterally. Regular, even, nonlabored  Abdomen:   Soft. Unremarkable postoperative appearance with PEG in place. Abdominal binder overlying. No longer distended. Bowel sounds normoactive. Nontender to palpation. Extremities:    Peripheral pulses present. No peripheral edema. Neurologic:    Awake, arouses to verbal stimuli easily. Tracks examiner during conversation. Does not answer questions or follow commands. Significant improvement in level consciousness from admission. Psych:   Awake and alert, no more than mildly agitated. Musculoskeletal:   No joint edema, enlargement or tenderness appreciated gait not assessed. Integumentary:  No rashes  Skin normal color and texture. Patient has scattered ecchymotic areas on the upper extremities in various stages of healing. Genitalia/Breast:  Casey catheter. DISPOSITION:  The patient's condition is fair. At this time the patient is without objective evidence of an acute process requiring continuing hospitalization or inpatient management. They are stable for discharge with outpatient follow-up. I have spoken with the patient and discussed the results of the current hospitalization, in addition to providing specific details for the plan of care and counseling regarding the diagnosis and prognosis. The plan has been discussed in detail and they are aware of the specific conditions for emergent return, as well as the importance of follow-up. Their questions are answered at this time and they are agreeable with the plan for discharge to nursing home    DISCHARGE MEDICATIONS:    [facility] Medication Instructions [facility]:[id number]    Printed on:02/10/21 [id number]   Medication Information                      albuterol (PROVENTIL) (2.5 MG/3ML) 0.083% nebulizer solution  Take 3 mLs by nebulization every 4 hours as needed for Wheezing or Shortness of Breath             divalproex (DEPAKOTE) 125 MG DR tablet  Take 125 mg by mouth 2 times daily              donepezil (ARICEPT) 10 MG tablet  Take 10 mg by mouth nightly             lansoprazole 3 MG/ML SUSP  5 mLs by Per NG tube route every morning (before breakfast)             latanoprost (XALATAN) 0.005 % ophthalmic solution  Place 1 drop into both eyes nightly             memantine ER (NAMENDA XR) 14 MG CP24 extended release capsule  Take 14 mg by mouth daily             metoprolol tartrate (LOPRESSOR) 25 MG tablet  1 tablet by PEG Tube route 2 times daily             QUEtiapine (SEROQUEL) 25 MG tablet  Take 25 mg by mouth 2 times daily (with meals) Indications: 8am and 5pm              sertraline (ZOLOFT) 25 MG tablet  Take 25 mg by mouth daily               Antibiotics will be administered as per the infectious disease team.    FOLLOW UP/INSTRUCTIONS:  · This patient is instructed to follow-up with her primary care physician. · Patient is instructed to follow-up with the consults listed above as directed by them. · she is instructed to resume home medications and take new medications as indicated in the list above. · If the patient has a recurrence of symptoms, she is instructed to go to the ED. Preparing for this patient's discharge, including paperwork, orders, instructions, and meeting with patient did require > 40 minutes.     Saurabh Corey DO   2/10/2021  11:17 AM

## 2021-02-10 NOTE — PROGRESS NOTES
Called pharmacy & spoke to pharmacist about clarification to put in order for Albuterol meter dose inhaler per NP. Pharmacist stated Albuterol sulfate  (90Base) MCG/ACT inhaler is what is to be ordered. Ordered inhaler into d/c instruction & discontinued nebulizer.

## 2021-02-10 NOTE — PROGRESS NOTES
[facility]  [facility]  Progress Note    Chief Complaint   Patient presents with    Fever     from NH, usually demented, SOB and fever per NH     SUBJECTIVE:  Laying in bed, opens eyes to name- minimal response    Afebrile, no leukocytosis   2L    PEG in place   Patient is tolerating medications. No reported adverse drug reactions. Review of systems:  As stated above in the chief complaint, otherwise negative. Medications:  Scheduled Meds:   vancomycin  125 mg Per G Tube 4 times per day    amoxicillin-clavulanate  1,800 mg Per G Tube 2 times per day    metoprolol tartrate  25 mg PEG Tube BID    lansoprazole  15 mg Per NG tube QAM AC    enoxaparin  30 mg Subcutaneous Daily    sodium chloride flush  10 mL Intravenous 2 times per day    budesonide  0.5 mg Nebulization BID    Arformoterol Tartrate  15 mcg Nebulization BID     Continuous Infusions:   sodium chloride       PRN Meds:sodium chloride, sodium chloride flush, acetaminophen **OR** acetaminophen, albuterol, magnesium sulfate, sodium phosphate IVPB **OR** sodium phosphate IVPB, potassium chloride    OBJECTIVE:  /68   Pulse 102   Temp 98.5 °F (36.9 °C) (Axillary)   Resp 22   Ht 5' 4\" (1.626 m)   Wt 141 lb 4.8 oz (64.1 kg)   SpO2 100%   BMI 24.25 kg/m²   Temp  Av.5 °F (36.9 °C)  Min: 97.7 °F (36.5 °C)  Max: 99.3 °F (37.4 °C)  Constitutional:  The patient is more awake and alert, cachetic   Skin:    Warm and dry. HEENT:      AT/NC  Chest:     Symmetrical expansion. Dec bs ant no wheeze  Cardiovascular:  S1 and S2 are rhythmic and regular   Abdomen:      SOFT distended NGT distended ++ PEG with binder   Extremities:     Edema.  RIGHT HEEL ESCHAR dressed  CNS    Awake and alert   Lines: PIV     Radiology:  Laboratory and Tests Review:  Lab Results   Component Value Date    WBC 14.2 (H) 02/10/2021    WBC 11.9 (H) 2021    WBC 11.3 2021    HGB 8.4 (L) 02/10/2021 HCT 27.5 (L) 02/10/2021    MCV 90.8 02/10/2021     (H) 02/10/2021     No results found for: CRPHS  Lab Results   Component Value Date    ALT 15 02/10/2021    AST 29 02/10/2021    ALKPHOS 90 02/10/2021    BILITOT 0.2 02/10/2021     Lab Results   Component Value Date     02/10/2021    K 3.3 02/10/2021    K 3.7 02/02/2021     02/10/2021    CO2 20 02/10/2021    BUN 10 02/10/2021    CREATININE 0.4 02/10/2021    CREATININE 0.3 02/09/2021    CREATININE 0.4 02/06/2021    GFRAA >60 02/10/2021    LABGLOM >60 02/10/2021    GLUCOSE 154 02/10/2021    GLUCOSE 87 04/10/2012    PROT 5.7 02/10/2021    LABALBU 2.2 02/10/2021    LABALBU 4.1 04/10/2012    CALCIUM 8.1 02/10/2021    BILITOT 0.2 02/10/2021    ALKPHOS 90 02/10/2021    AST 29 02/10/2021    ALT 15 02/10/2021     Lab Results   Component Value Date    CRP 29.2 (H) 11/01/2020    CRP 6.3 (H) 04/10/2012     Lab Results   Component Value Date    SEDRATE 101 (H) 11/01/2020    SEDRATE 35 (H) 07/25/2013    SEDRATE 40 (H) 04/18/2013       Microbiology:   Recent Labs     02/09/21  [facility] Not Detected     Lab Results   Component Value Date    BLOODCULT2 5 Days no growth 02/04/2021    BLOODCULT2 5 Days no growth 02/02/2021    BLOODCULT2 5 Days no growth 11/01/2020    ORG Enterococcus faecalis 02/02/2021    ORG Staphylococcus coagulase-negative 02/02/2021    ORG Lactobacillus 10/22/2015     ASSESSMENT  · SEPSIS  · LEUKOCYTOSIS   · HCAP VS ASPIRATION   · DIVERTICULITIS/proctitis  · UTI GPC E faecalis  · Cons bacteremia contaminant - repeat bc negative   · H/O COVID AB +  · splenic infarct  · S/p PEG 2/8   · Volume contraction     PLAN  · Continue with Augmentin suspesion via PEG - day #3  · About Day #10 total of atbs   · Continue with PO Vancomycin   · Poor prognosis   · Monitor labs   · Med rec done-   · For d/c today to [facility] skilled nursing [place]     PAUL Selby - CNS  2/10/2021  2:08 PM      Patient examined Patient tolerating oral Augmentin via PEG tube fell  UTI resolving    I have discussed the case, including pertinent history and physical  exam findings . I have seen and examined the patient and the key elements of the encounter have been performed by me. I agree with the assessment, plan and orders as documented.       Treatment plan as per my recommendation     Audra Dubon MD, FACP  2/10/2021  6:31 PM

## 2021-02-10 NOTE — PROGRESS NOTES
Completed session in chair position.     N/T Supine to sit: Maximal Assist of 2  Sit to supine: Maximal Assist of 2   Functional Transfers NT     N/T      Functional Mobility NT     N/T     Balance Sitting:     Static:  NT    Dynamic:NT  Standing: NT     Pt leaning to L in chair position. Assist with pillow to correct Sitting:     Static:  NT    Dynamic:NT  Standing: NT  Sitting:     Static:  Fair-    Dynamic:fair-      Activity Tolerance Poor poor  Fair-   Visual/  Perceptual Continue to assess. Pt eyes closed throughout session.          Hand Dominance Not reported       Strength ROM Additional Info:    RUE  Unable to assess due to contractures Limited in all planes     LUE Unable to assess due to contractures  Limited in all planes      - BUE PROM exercises: 10 reps in all planes of movement to increase ROM/endurance required for functional transfers/ADL participation and to decrease risk of further contractures and in attempts to reduce edema. Exercises completed in shoulder and elbow flexion/extension, internal/external rotation, abduction/adduction, supination/pronation, digit and wrist flexion/extension. ROM appears limited in all planes. Min rest breaks provided 2* to decreased endurance/tolerance. 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 Elevation of B UE's d/t slight edema, as well. Comments: Patient cleared by nursing staff. Upon arrival pt supine in bed. Pt agreeable to OT tx session. Pt educated with regards to  B UE ROM ex's, oral hygiene, positioning, ECT's. At end of session pt supine in bed   with all lines and tubes intact, call light within reach. Overall, pt demonstrated decreased independence and safety during completion of ADL/functional transfers/mobility tasks. Pt would benefit from continued skilled OT to increase safety and independence with completion of ADL/IADL tasks for functional independence and quality of life. Pt required cues and education as noted above for safe facilitation and completion of tasks. Therapist provided skilled monitoring of patient's response during treatment session. Prior to and at the end of session, environmental modifications/line management completed for patients safety and efficiency of treatment session. Overall, patient demonstrates moderate/maximal difficulties with completion of BADLs and IADLs. Factors contributing to these difficulties include decreased follow through with instruction, contractures of B hands, decreased endurance, and generalized weakness. As noted above, patient likely to benefit from further OT intervention to increase independence, safety, and overall quality of life. Treatment:     ? Bed mobility: Facilitated bed mobility with cues for proper body mechanics and sequencing to prepare for ADL completion. ? Skilled positioning: Proper positioning to improve interaction with environment, overall functioning and for edema management  ? Therapeutic Ex's: To reduce edema and reduce further risk of B UE contractures, as well as to increase ROM for functional transfers and ADL participation  ? ADL completion: Self-care retraining for the above-mentioned ADLs; training on proper hand placement, safety technique, sequencing, and energy conservation techniques. · Pt has made fair minus progress towards set goals    ·   OT 1-3x/week for 5-7 days during hospitalization        Treatment Time In: 8:44 AM     Treatment Time Out: 8:58 AM            Treatment Charges: Mins Units   ADL/Home Mgt     19377 2 0   Thera Activities     19385 2 0   Ther Ex                 18688 10 1   Manual Therapy    03555     Neuro Re-ed         30301     Orthotic manage/training                               91158     Non Billable Time     Total Timed Treatment 14 4927 PAULINE Iniguez/MARY #[id number]

## (undated) DEVICE — Device

## (undated) DEVICE — CONTAINER SPEC COLL 960ML POLYPR TRIANG GRAD INTAKE/OUTPUT

## (undated) DEVICE — Device: Brand: DEFENDO VALVE AND CONNECTOR KIT

## (undated) DEVICE — SPONGE GZ 4IN 4IN 4 PLY N WVN AVANT

## (undated) DEVICE — 6 X 9  1.75MIL 4-WALL LABGUARD: Brand: MINIGRIP COMMERCIAL LLC

## (undated) DEVICE — KENDALL 450 SERIES MONITORING FOAM ELECTRODE - RECTANGULAR SHAPE ( 3/PK): Brand: KENDALL

## (undated) DEVICE — LUBRICANT SURG JELLY ST BACTER TUBE 4.25OZ

## (undated) DEVICE — MEDI-VAC YANKAUER SUCTION HANDLE W/BULBOUS TIP: Brand: CARDINAL HEALTH

## (undated) DEVICE — BINDER ABD M/L H12IN FOR 46-62IN WHT 4 SLD PNL DSGN HOOP

## (undated) DEVICE — BLOCK BITE 60FR CAREGUARD

## (undated) DEVICE — MEDI-VAC NON-CONDUCTIVE SUCTION TUBING: Brand: CARDINAL HEALTH

## (undated) DEVICE — KIT BEDSIDE REVITAL OX 500ML

## (undated) DEVICE — MASK,FACE,MAXFLUIDPROTECT,SHIELD/ERLPS: Brand: MEDLINE

## (undated) DEVICE — GOWN ISOLATN REG YEL M WT MULTIPLY SIDETIE LEV 2